# Patient Record
Sex: MALE | URBAN - METROPOLITAN AREA
[De-identification: names, ages, dates, MRNs, and addresses within clinical notes are randomized per-mention and may not be internally consistent; named-entity substitution may affect disease eponyms.]

---

## 2020-11-24 ENCOUNTER — TRANSFERRED RECORDS (OUTPATIENT)
Dept: HEALTH INFORMATION MANAGEMENT | Facility: CLINIC | Age: 65
End: 2020-11-24

## 2020-11-30 ENCOUNTER — TRANSFERRED RECORDS (OUTPATIENT)
Dept: HEALTH INFORMATION MANAGEMENT | Facility: CLINIC | Age: 65
End: 2020-11-30

## 2020-11-30 ENCOUNTER — MEDICAL CORRESPONDENCE (OUTPATIENT)
Dept: HEALTH INFORMATION MANAGEMENT | Facility: CLINIC | Age: 65
End: 2020-11-30

## 2020-12-18 ENCOUNTER — ANCILLARY PROCEDURE (OUTPATIENT)
Dept: PET IMAGING | Facility: CLINIC | Age: 65
End: 2020-12-18
Attending: UROLOGY
Payer: COMMERCIAL

## 2020-12-18 DIAGNOSIS — C61 MALIGNANT NEOPLASM OF PROSTATE (H): ICD-10-CM

## 2020-12-23 ENCOUNTER — MEDICAL CORRESPONDENCE (OUTPATIENT)
Dept: HEALTH INFORMATION MANAGEMENT | Facility: CLINIC | Age: 65
End: 2020-12-23

## 2023-06-20 ENCOUNTER — TRANSFERRED RECORDS (OUTPATIENT)
Dept: HEALTH INFORMATION MANAGEMENT | Facility: CLINIC | Age: 68
End: 2023-06-20

## 2023-09-11 ENCOUNTER — TRANSFERRED RECORDS (OUTPATIENT)
Dept: HEALTH INFORMATION MANAGEMENT | Facility: CLINIC | Age: 68
End: 2023-09-11

## 2023-12-05 ENCOUNTER — TRANSFERRED RECORDS (OUTPATIENT)
Dept: HEALTH INFORMATION MANAGEMENT | Facility: CLINIC | Age: 68
End: 2023-12-05

## 2024-03-06 ENCOUNTER — TRANSFERRED RECORDS (OUTPATIENT)
Dept: HEALTH INFORMATION MANAGEMENT | Facility: CLINIC | Age: 69
End: 2024-03-06

## 2024-03-22 ENCOUNTER — TRANSFERRED RECORDS (OUTPATIENT)
Dept: HEALTH INFORMATION MANAGEMENT | Facility: CLINIC | Age: 69
End: 2024-03-22

## 2024-05-24 ENCOUNTER — TRANSFERRED RECORDS (OUTPATIENT)
Dept: HEALTH INFORMATION MANAGEMENT | Facility: CLINIC | Age: 69
End: 2024-05-24

## 2024-07-26 ENCOUNTER — TRANSFERRED RECORDS (OUTPATIENT)
Dept: HEALTH INFORMATION MANAGEMENT | Facility: CLINIC | Age: 69
End: 2024-07-26

## 2024-09-27 ENCOUNTER — TRANSFERRED RECORDS (OUTPATIENT)
Dept: HEALTH INFORMATION MANAGEMENT | Facility: CLINIC | Age: 69
End: 2024-09-27

## 2024-10-08 ENCOUNTER — TRANSFERRED RECORDS (OUTPATIENT)
Dept: HEALTH INFORMATION MANAGEMENT | Facility: CLINIC | Age: 69
End: 2024-10-08

## 2025-03-07 ENCOUNTER — TRANSFERRED RECORDS (OUTPATIENT)
Dept: HEALTH INFORMATION MANAGEMENT | Facility: CLINIC | Age: 70
End: 2025-03-07

## 2025-04-09 ENCOUNTER — TRANSFERRED RECORDS (OUTPATIENT)
Dept: HEALTH INFORMATION MANAGEMENT | Facility: CLINIC | Age: 70
End: 2025-04-09
Payer: COMMERCIAL

## 2025-04-16 ENCOUNTER — TRANSFERRED RECORDS (OUTPATIENT)
Dept: HEALTH INFORMATION MANAGEMENT | Facility: CLINIC | Age: 70
End: 2025-04-16

## 2025-04-16 ENCOUNTER — MEDICAL CORRESPONDENCE (OUTPATIENT)
Dept: HEALTH INFORMATION MANAGEMENT | Facility: CLINIC | Age: 70
End: 2025-04-16

## 2025-04-21 ENCOUNTER — PATIENT OUTREACH (OUTPATIENT)
Dept: ONCOLOGY | Facility: CLINIC | Age: 70
End: 2025-04-21

## 2025-04-21 ENCOUNTER — TRANSCRIBE ORDERS (OUTPATIENT)
Dept: OTHER | Age: 70
End: 2025-04-21

## 2025-04-21 DIAGNOSIS — C61 PROSTATE CANCER METASTATIC TO MULTIPLE SITES (H): Primary | ICD-10-CM

## 2025-04-21 NOTE — PROGRESS NOTES
New Patient Oncology Nurse Navigator Note     Referring provider:   Rafi Rooney (Hematology/Oncology) [facility]    T [phone]  F [phone]      Referred to (specialty): Medical Oncology    Requested provider (if applicable):   Dr. Boss      Date Referral Received:   04/21/25     Evaluation for :        Clinical History (per Nurse review of records provided):    Everett Segundo (Radiation Oncology),   Rafi Rooney (Hematology/Oncology)  Wenceslao De La Torre MD (Urology)    Per [facility] note 4/16/25:  Assessment  1. 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  -6/28/23, PSA 9.8, switched to Abiraterone + prednisone 5 mg PO daily + ADT  -12/13/2023, PSA 13, PSMA (stable/bone avid mets) => Apalutamide declined by insurance  -1/17/2024, PSA 18, initiated C1 docetaxel Q21D + prednisone Smg BID + ADT  PSA is 21<PSA is 19<PSA is 14.9, PSA is 9.59 as of 7/26/2024.  -7131/2024: completed C10D1 docetaxel Q21D + prednisone S5mg BID + ADT.  - 8/21/24: completed C11D1 docetaxel Q21D + prednisone 5mg BID + ADT + C1D64 Eligard  - 9/11/2024: completed C12D1 docetaxel Q21D + prednisone Smg BID + ADT  - 9/27/24 PSMA PET showing disease progression. Consult to Rad Onc.  - 11/13/24: Pluvicto started, given every 6 weeks. Managed by [facility] team.  - 1/7/25: Cycle 2 Pluvicto to be given later today. Pt has Rad Onc BAYLEE fi/u for following labs and PSA.  - 2/5/25: Proceed Eligard shot today, along with Zometa. Next cycle of Pluvicto is on 2/18/25. Continues on darolutamide 600 mg BID.  - 4/1/2025: Received cycle 4 Pluvicto. PSA is 84.90 on 4/9/2025.    Plan  Reviewed PSMA PET scan with the patient in detail, which showed some osseous metastatic disease have improved, however, there  are numerous new radiotracer avid osseous metastases involving  the axial and proximal appendicular skeleton.  Reviewed his labs from 4/9/2025, CBC showed WBC of 10.0, HGB at 11.6, and platelets at 217. CMP is within normal limits. His PSA  continues to rise at 84.90 on 4/9/2025, which was previously 68.10 on 3/7/2025, 35.00 on 12/16/2024, and 18.40 on 10/23/2024.  1. He was started on Pluvicto through [facility] on 11/13/24 to be given every 6 weeks, has tolerated 4 cycles well,  most recent was on 4/1/2025.    - Would recommend to continue 2 additional cycles of Pluvicto through [facility] given every 6 weeks. Next treatment  is due on 5/13/2025. Will recheck PSA and PSMA PET scan after cycle 6 to assess improvement. If disease progression  continues, options include: clinical trial participation vs. chemotherapy with Cabazitaxel and palliative care approach.  - Continue Eligard Q3M. Next treatment is due on 4/30/2025.  - Continue darolutamide oral 600 mg BID for androgen blockade.  - Discussed to follow-up with [facility] for potential clinical trial eligibility. Sent consult to see Dr. Boss at [facility] for potential clinical trial eligibility.  2. Skeletal metastasis: Continue Zometa every 6 weeks. Next dose is due on 5/6/2025.    See oncology notes for full hx.    Pertinent urology notes, pathology/labs & imaging bookmarked**        Records Location (Care Everywhere, Media, etc.):   [facility]  [facility]      I called in attempt to reach patient to discuss referral to oncology.  There was no answer so I left message with our new patient scheduling number: [phone], for patient to call to schedule consult appointment.     I will forward on referral at this time with the following plan-     TENTATIVE PLAN:  SCHEDULE: Dr. Boss @ [facility], [facility] in person, first available    Basia Puga, RN, BSN  Oncology New Patient Nurse Navigator   [facility]  [phone]

## 2025-04-23 NOTE — TELEPHONE ENCOUNTER
RECORDS STATUS - ALL OTHER DIAGNOSIS      Action    Action Taken 4/23/25  Spoke w/ Héctor @ [facility] Medical Records - advised pt last seen 4/21/25. They will fax these records, push imaging & fax imaging reports    Spoke w/ Jackelin @ [facility] - they will push imaging & fax reports shortly.    Spoke w/ Eloise @ [facility] - advised we have most Records (11/30/20).    Imaging reports from [facility] received, sent to [facility] for STAT upload, emailed to NN Email, YU GUTIERREZ.     Records from [facility] received, sent to [facility] for STAT upload, emailed to  Email, YU GUTIERREZ.   10:39 AM      RECORDS RECEIVED FROM: [facility], [facility], [facility], [facility]   DATE RECEIVED:    NOTES STATUS DETAILS   OFFICE NOTE from referring provider [facility] Dr. Rafi Rooney : 4/16/25   OFFICE NOTE from medical oncologist [facility] Dr. Rafi Rooney : 4/16/25   OFFICE NOTE from urologist [facility] Dr. Wenceslao De La Torre: 11/30/25   OPERATIVE REPORT CE - [facility] 9/23/24: Incision Drainage Perirectal Abscess  11/14/23, 5/3/16: Colonoscopy  1/24/17: Robotic Assisted Radical Prostatectomy  11/1/16: TRUS   MEDICATION LIST [facility]   LABS     PATHOLOGY REPORTS [facility], Reports in CE, slides requested 4/23 1/24/17: [id number]  11/1/16: [id number]   ANYTHING RELATED TO DIAGNOSIS CE - [facility] 4/9/25   PATHOLOGY FEDEX TRACKING   TRACKING #: [id number]   GENONOMIC TESTING     TYPE:     IMAGING (NEED IMAGES & REPORT)     CT SCANS PACS [facility]  9/22/24    [facility]  11/11/16 - 7/26/24   MRI PACS [facility]  9/26/18   NM PACS [facility]  11/11/16 - 3/22/24   ULTRASOUND PACS [facility]  11/1/16   PET PACS [facility]  4/9/25, 5/24/24    [facility]  9/27/24

## 2025-05-01 ENCOUNTER — LAB REQUISITION (OUTPATIENT)
Dept: LAB | Facility: CLINIC | Age: 70
End: 2025-05-01
Payer: COMMERCIAL

## 2025-05-01 PROCEDURE — 88321 CONSLTJ&REPRT SLD PREP ELSWR: CPT | Performed by: PATHOLOGY

## 2025-05-04 NOTE — PROGRESS NOTES
[facility] Medical [facility]cology Second Opinion Consultation Note       Date of visit: May 4, 2025    Dear Dr. Rooney, thank you for referring your patient for a Second Opinion consultation at the [facility].  A brief overview of his oncological history as well as my recommendations follow below.    CC: Prostate cancer     HPI:   Wenceslao Finch is a 70 year old male with a history of prostate cancer diagnosed in 11/2016 as [place] score 4+3 = 7, s/p prostatectomy, staged at pT2. PSA became detectable again and he underwent salvage radiation with Dr. Irving ending 12/2018, along with starting 2 years of ADT. PSA became detectable again in 07/2020 without any evidence of metastatic disease on PET. He continued progressing on multiple therapies and developed bone mets. He was initiated on Docetaxel/Darolutamide/Prednisone 1/17/2024 and has continued this with a  couple of hospitalizations for neurtropenic fever, last cycle on 9/11/2024. In 9/7/2024 PSMA PET showed disease progression following which he was started on Pluvicto and has completed 4 cycles so far, last on 4/1/2025. He continues to have a rising PSA (84.9 on 4/9/25). He continues to be on Eligard, last received 4/30/2025 and Darolutamide 600mg bid as well as Zometa for skeletal metastasis, next due on 5/6/2025. Referral sent here to see if the patient would be eligible for clinical trials.    INTERVAL HISTORY:   The patient presents with his wife today. He states that overall he is doing well and has not had any side effects from Pluvicto. 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 He currently denies any fever, chills, nausea or vomiting. He has tingling on his toes but is able to feel the ground. No other neurological symptoms. No numbness or tingling in upper extremities.     ONCOLOGY HISTORY: .   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  -23, PSA 9.8, switched to Abiraterone + prednisone 5 mg PO daily + ADT  -2023, PSA 13, PSMA (stable/bone avid mets) => Apalutamide declined by insurance  -2024, PSA 18, initiated C1 docetaxel Q21D + prednisone Smg BID + ADT  PSA is 21<PSA is 19<PSA is 14.9, PSA is 9.59 as of 2024.  -: completed C10D1 docetaxel Q21D + prednisone S5mg BID + ADT.  - 24: completed C11D1 docetaxel Q21D + prednisone 5mg BID + ADT + C1D64 Eligard  - 2024: completed C12D1 docetaxel Q21D + prednisone Smg BID + ADT  - 24 PSMA PET showing disease progression. Consult to Rad Onc.  - 24: Pluvicto started, given every 6 weeks. Managed by [facility] team.  - 25: Cycle 2 Pluvicto to be given later today. Pt has Rad Onc BAYLEE fi/u for following labs and PSA.  - 25: Proceed Eligard shot today, along with Zometa.. Continues on darolutamide 600 mg BID.  - 2025: Received cycle 4 Pluvicto. PSA is 84.90 on 2025.  Clear progression on PSMA PET from  with numerous new PSMA avid lesions.      GENOMIC STUDIES:   Caris testing: AR-3+, TMB low, MSI indeterminate, BRCA negative    GUIDELINES USED: NCCN    INTENT OF THERAPY: Palliative     CLINICAL TRIALS:  [id number]  Janux excludes post-Pluvicto     Past medical and surgical history:  Prostate cancer, Hot flashes, Pre-diabetes, Drug induced constipation   S/p robotic radical prostatectomy, 2017    Family History  Father-liver cancer age 77  Brother-pancreatic cancer   Sister-breast cancer age 39  Paternal grandmother-colon cancer age 82     Social history  Patient is , works as a .  Has not been working much since being on chemotherapy.  No smoking, occasional alcohol.  Has no children    ALLERGIES: No known drug allergies    MEDS:  Current Outpatient  "Medications   Medication Sig Dispense Refill    calcium carbonate 500 mg, elemental, (OSCAL 500) 1250 (500 Ca) MG TABS tablet Take 500 mg by mouth.      EFFEXOR  MG 24 hr capsule Take 150 mg by mouth daily.      leuprolide, 4 Month, (ELIGARD) 30 MG       [START ON 5/13/2025] lutetium Erma 177 vipivotide tetraxetan (PLUVICTO) 27 mCi/mL radioisotope injection Inject 200 millicuries into the vein.      NUBEQA 300 MG tablet       predniSONE (DELTASONE) 5 MG tablet Take 5 mg by mouth.      vitamin E (TOCOPHEROL) 400 units (180 mg) capsule Take 400 Units by mouth daily.       No current facility-administered medications for this visit.     ROS-Remainder of 14 point ROS reviewed and negative except as in HPI.    PHYSICAL EXAM:  ECOG-PS=1    BP (!) 158/83   Pulse 80   Temp 98.2  F (36.8  C) (Oral)   Resp 18   Ht 1.82 m (5' 11.65\")   Wt 121.1 kg (267 lb)   SpO2 97%   BMI 36.56 kg/m    Exam:  Constitutional: healthy, alert, and no distress  Head: Normocephalic. No masses, lesions grossly.   Neck: Neck supple.   Cardiovascular: No edema or JVD.  Respiratory: normal WOB on RA   Gastrointestinal: negative, non-distended   : Deferred  Musculoskeletal: extremities normal- no gross deformities noted, gait normal, and normal muscle tone  Skin: no suspicious lesions or rashes on exposed areas of skin   Neurologic: CNII-XII grossly intact, normal speech   Psychiatric: mentation appears normal and affect normal/bright    LABS AND IMAGES:  PSMA PET scan from [facility] personally reviewed from 4/8/25.  Numerous new metastases present compared to 9/2024.       Labs:   CMP WNL and CBC with WBC of 10.0, Hgb 11.6, and plt 217.      PSA trend, see oncology history.       IMPRESSION AND PLAN:    # metastatic castration resistant prostate cancer    Burt was referred for second opinion regarding potential clinical trials and was initially seen on May 5, 2025.  He was initially diagnosed with Yarelis 4+3 equal 7 prostate " adenocarcinoma in 2016 underwent radical prostatectomy at that time.  PSA became detectable again in 2018 and he was initiated on ADT for 2 years total, which was then held.  PSA began to rise approximately 4 months later (November 2020) after holding ADT when his PSA was 7.  He was restarted on ADT with Dr. Caro in February 2021 and continued on ADT, notably his PSA began to rise significantly in January 2022 rising from 26.8-40.  He was started on enzalutamide in January 2022 for nonmetastatic prostate cancer that was hormone resistant.  His first metastasis appeared on nuclear medicine bone scan in 2023 in the left mid sternum, prompting switch to abiraterone 1000 mg daily along with prednisone.  His PSA continued to slowly rise through January 2024 when he was started on docetaxel in the CRPC setting.  Cycle 1 day 1 of docetaxel was January 17, 2024 when his PSA was 18 he was started on Pluvicto at [facility] in November 2024 and completed 4 total cycles before restaging on April 8, 2025.  That PSMA PET scan was notable for numerous new PSMA avid lesions and a PSA of 84, demonstrating clear progression while on Pluvicto and after the typical expected timeframe for treatment related flare.    At our initial visit on May 5, 2025, we reviewed his treatment history in addition to my recommendation that he consider not undergoing any additional doses of Pluvicto due to its clear lack of efficacy.  His last dose of Pluvicto was April 1, 2025.  We reviewed potential clinical trials that may be available in the very near future, on the order of weeks depending on trial opening.  We reviewed the  phase 3 study which he would be clearly eligible for.  This is a phase 3 randomized study comparing AMG 5092 with standard of care arm, either ARSI switch or cabazitaxel.  Since he has received both potential arms on the ARS I switch (enzalutamide and abiraterone), he would be in eligible to enroll on the ARSI  switch arm and would need to enroll on cabazitaxel control arm.  We reviewed that study randomization is 2:1 4 evaluate or make to cabazitaxel in his case given his prior treatment history.  He has received docetaxel in the CRPC setting and would be eligible under the newest protocol amendment that is pending.  I discussed that this trial is not open quite yet although it is very close.  Due to his last Pluvicto dose on April 1, he would not be eligible to receive his first dose of  until June 1 due to the 2-month washout required if more than 2 doses of radioligand therapy have been given.  We reviewed the major toxicities in particular cytokine release syndrome and neurologic toxicity in addition to myalgias and edema.  At this time we do not have the protocol or consent that we can share with him due to the study not being active.  As soon as a study is activated and open for enrollment we will share the consent with Burt and his wife for them to review.  We anticipate that this will open within the next 1 to 2 weeks and then we will move quickly with screening enrollment.  We did discuss JANUX 007 as well, which is also not open.  On further review of the existing protocol this does not allow for post Pluvicto treated patients so it is no longer an option for him.  We have several other clinical trials that may potentially have eligibility for him however slots are extremely limited at the moment with numerous patients on our internal waitlist for consideration.  I believe that  with its approximately 60% PSA 50 Rumer response so far would potentially be his best option if he is interested.    Of note when he was on docetaxel he did develop neutropenic fevers.  He was later started on Neulasta.  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  He is not on chronic antibiotics for this.  If you be randomized to the  cabazitaxel arm of the  phase 3 trial, I would recommend use of Neulasta and potentially prophylactic antibiotic use as well given his history.    I will see him back in 2 weeks, potentially for consent if we are able to get the study open by then.  If the study is not officially open we will not consent on that day.  I communicated my recommendations with his referring physician who is in agreement that additional Pluvicto was not an ideal choice for him.    PLAN:   I would not recommend additional Pluvicto doses.   If you would prefer not to get additional Pluvicto doses, please let your [facility] team know right away.  You can also continue Pluvicto if that is your preference, but I do not think two more doses will help.   The trials we discussed were:  [id number], a Phase 3 trial with randomization 2:1 to cabazitaxel.    CKOP172, a Phase 1 trial without randomization  ADRX-0405, very limited slots at the moment and likely months to get one  4.  LFJ322 and ESGZ836 will be open very soon, but are not currently open as of today.   5.  Cabazitaxel alone is another option if you do not prefer a clinical trial.  I would recommend giving Neulasta after each dose of chemotherapy.    6.  Prostate Cancer Foundation (PCF.org) is another good resource.    7.  RKO514 resource: https://prostatehealthed.org/page.php?zs=138  8.  JANX007 https://www.urologNourishs.com/view/janx007-demonstrates-encouraging-clinical-activity-in-mcrpc  9. Clinicaltrials.gov also has good information about each trial as you think about them.     I have seen and personally evaluated the patient today.  I reviewed vitals, medications, laboratory results, and I viewed pertinent imaging studies.  After doing so, I formulated a plan with Dr. Robles as documented in this note.  I have seen and personally evaluated the patient today.  I spent 90 minutes in the care of Wenceslao today, including an independent face-to-face assessment, review of vitals,  medications, laboratory results, and independent review of imaging studies.     The longitudinal plan of care for mCRPC was addressed during this visit. Due to the added complexity in care, I will continue to support Wenceslao Finch in the subsequent management of this condition(s) and with the ongoing continuity of care of this condition(s).     Servando Boss MD, PhD   of Medicine   Oncology/BMT/Cellular Therapies

## 2025-05-05 ENCOUNTER — PRE VISIT (OUTPATIENT)
Dept: ONCOLOGY | Facility: CLINIC | Age: 70
End: 2025-05-05
Payer: COMMERCIAL

## 2025-05-05 ENCOUNTER — ONCOLOGY VISIT (OUTPATIENT)
Dept: ONCOLOGY | Facility: CLINIC | Age: 70
End: 2025-05-05
Attending: UROLOGY
Payer: COMMERCIAL

## 2025-05-05 VITALS
BODY MASS INDEX: 36.16 KG/M2 | HEIGHT: 72 IN | WEIGHT: 267 LBS | SYSTOLIC BLOOD PRESSURE: 158 MMHG | HEART RATE: 80 BPM | OXYGEN SATURATION: 97 % | DIASTOLIC BLOOD PRESSURE: 83 MMHG | TEMPERATURE: 98.2 F | RESPIRATION RATE: 18 BRPM

## 2025-05-05 DIAGNOSIS — C61 METASTATIC CASTRATION-RESISTANT ADENOCARCINOMA OF PROSTATE (H): Primary | ICD-10-CM

## 2025-05-05 DIAGNOSIS — C61 PROSTATE CANCER METASTATIC TO BONE (H): ICD-10-CM

## 2025-05-05 DIAGNOSIS — Z19.2 METASTATIC CASTRATION-RESISTANT ADENOCARCINOMA OF PROSTATE (H): Primary | ICD-10-CM

## 2025-05-05 DIAGNOSIS — C79.51 PROSTATE CANCER METASTATIC TO BONE (H): ICD-10-CM

## 2025-05-05 DIAGNOSIS — Z19.2 HORMONE RESISTANT PROSTATE CANCER (H): ICD-10-CM

## 2025-05-05 DIAGNOSIS — C61 HORMONE RESISTANT PROSTATE CANCER (H): ICD-10-CM

## 2025-05-05 LAB
PATH REPORT.COMMENTS IMP SPEC: ABNORMAL
PATH REPORT.COMMENTS IMP SPEC: ABNORMAL
PATH REPORT.COMMENTS IMP SPEC: YES
PATH REPORT.FINAL DX SPEC: ABNORMAL
PATH REPORT.GROSS SPEC: ABNORMAL
PATH REPORT.MICROSCOPIC SPEC OTHER STN: ABNORMAL
PATH REPORT.RELEVANT HX SPEC: ABNORMAL
PATH REPORT.RELEVANT HX SPEC: ABNORMAL
PATH REPORT.SITE OF ORIGIN SPEC: ABNORMAL

## 2025-05-05 PROCEDURE — 99205 OFFICE O/P NEW HI 60 MIN: CPT | Mod: GC | Performed by: STUDENT IN AN ORGANIZED HEALTH CARE EDUCATION/TRAINING PROGRAM

## 2025-05-05 PROCEDURE — 99417 PROLNG OP E/M EACH 15 MIN: CPT | Performed by: STUDENT IN AN ORGANIZED HEALTH CARE EDUCATION/TRAINING PROGRAM

## 2025-05-05 PROCEDURE — G2211 COMPLEX E/M VISIT ADD ON: HCPCS | Performed by: STUDENT IN AN ORGANIZED HEALTH CARE EDUCATION/TRAINING PROGRAM

## 2025-05-05 PROCEDURE — G0463 HOSPITAL OUTPT CLINIC VISIT: HCPCS | Performed by: STUDENT IN AN ORGANIZED HEALTH CARE EDUCATION/TRAINING PROGRAM

## 2025-05-05 RX ORDER — IBUPROFEN 200 MG
500 CAPSULE ORAL
COMMUNITY
Start: 2024-12-26

## 2025-05-05 RX ORDER — PREDNISONE 5 MG/1
5 TABLET ORAL
COMMUNITY
Start: 2024-08-21

## 2025-05-05 RX ORDER — MULTIVIT WITH MINERALS/LUTEIN
400 TABLET ORAL DAILY
COMMUNITY

## 2025-05-05 RX ORDER — VENLAFAXINE HCL 150 MG
150 CAPSULE, EXT RELEASE 24 HR ORAL DAILY
COMMUNITY
Start: 2024-11-13

## 2025-05-05 RX ORDER — DAROLUTAMIDE 300 MG/1
TABLET, FILM COATED ORAL
COMMUNITY
Start: 2024-05-29

## 2025-05-05 RX ORDER — LUTETIUM LU 177 VIPIVOTIDE TETRAXETAN 27 MCI/ML
200 INJECTION, SOLUTION INTRAVENOUS
COMMUNITY
Start: 2025-05-13

## 2025-05-05 ASSESSMENT — PAIN SCALES - GENERAL: PAINLEVEL_OUTOF10: NO PAIN (0)

## 2025-05-05 NOTE — PATIENT INSTRUCTIONS
Burt,   Here is what we discussed today:     I would not recommend additional Pluvicto doses.   If you would prefer not to get additional Pluvicto doses, please let your [facility] team know right away.  You can also continue Pluvicto if that is your preference, but I do not think two more doses will help.   The trials we discussed were:  [id number], a Phase 3 trial with randomization 2:1 to cabazitaxel.    VFJS365, a Phase 1 trial without randomization  ADRX-0405, very limited slots at the moment and likely months to get one  4.  BCS220 and QRGL831 will be open very soon, but are not currently open as of today.   5.  Cabazitaxel alone is another option if you do not prefer a clinical trial.  I would recommend giving Neulasta after each dose of chemotherapy.    6.  Prostate Cancer Foundation (PCF.org) is another good resource.    7.  OAS756 resource: https://prostatehealthed.org/page.php?af=499  8.  JANX007 https://www.urologProsperity [employer]te [employer]ds.com/view/janx007-demonstrates-encouraging-clinical-activity-in-mcrpc  9. Clinicaltrials.gov also has good information about each trial as you think about them.

## 2025-05-14 ENCOUNTER — PATIENT OUTREACH (OUTPATIENT)
Dept: ONCOLOGY | Facility: CLINIC | Age: 70
End: 2025-05-14
Payer: COMMERCIAL

## 2025-05-14 NOTE — TELEPHONE ENCOUNTER
Reached out to pt to discuss clinical trial [id number] Phase 3. Left a voicemail with direct call back number to further discuss and send consent information to review.    Liat Galindo MA, MSN, RN   Clinical Research Coordinator Nurse   Solid Tumor Unit  Clinical Trials Office     P: [phone]

## 2025-05-16 NOTE — PROGRESS NOTES
[id number]: Informed Consent Note     The consent form, including purpose, risks and benefits, was reviewed with Wenceslao Finch, and all questions were answered before he signed the consent form. The patient understands that the study involves an active treatment phase as well as a post-treatment follow up phase.     Present during the discussion were patient himself, his spouse Geovanna, Dr Boss and Liat Galindo RN. A copy of the signed form was provided to the patient. No procedures specific to this study were performed prior to the patient signing the consent form.    RACE AND ETHNICITY:  I discussed with Wenceslao Finch that the National Cancer [place] (NCI) has asked that information on race and ethnicity be obtained from NCI-sponsored studies' participants whenever possible and that the purpose for this request is to assist the NCI in evaluating whether persons of all races and ethnicity are given the opportunity to participate in new cancer treatment options. It was explained that the information will be sent to the NCI in a way in which he cannot be identified. It was also explained that providing this information is voluntary.  Wenceslao Finch provided the following responses:  Ethnicity: non-  Race: White  Liat Galindo RN     Reproductive Evaluation     Subject name: Wenceslao Finch   I discussed with the patient that in order to participate in this study he must agree to use effective contraception during therapy and continuing for 6 months after the last dose of xaluritamig, 4 months after last dose of cabazitaxel, 3 months after last dose of enzalutamide, or 3 weeks after the last dose of abiraterone acetate. Examples of effective contraception were provided, including hormonal contraception, intrauterine devices, and double barrier contraception. He agreed to use effective contraception per the study's requirements.  Liat Galindo RN  Form 503.03.03 (Version 1)     Effective date:  [id number]     Next Review Date: 01JUL2020

## 2025-05-17 ENCOUNTER — HEALTH MAINTENANCE LETTER (OUTPATIENT)
Age: 70
End: 2025-05-17

## 2025-05-18 NOTE — PATIENT INSTRUCTIONS
Burt,   Here is what we discussed today:     Consent signed today for [id number] study.   First dose of therapy (whether NAH800 or cabazitaxel) likely to be the week of 6/9 if everything goes as planned.    Screening to start.  Echocardiogram and imaging pending.    STOP darolutamide today to allow for appropriate washout prior to study initiation.   Starting tomorrow (5/20) cut prednisone in half and take 5mg a day once in the morning.  After 7 days, reduce to 5mg every OTHER day.  Please watch for signs/symptoms of adrenal insufficiency including worsening fatigue, low blood pressure or dizziness, nausea, and diarrhea.  Let me know right away if this happens and go back to the last prednisone dose that you felt well on.    Please watch for screening study scheduling communications.

## 2025-05-18 NOTE — PROGRESS NOTES
[facility] Medical Oncology Second Opinion Consultation Note       Date of visit: May 4, 2025    Dear Dr. Rooney, thank you for referring your patient for a Second Opinion consultation at the [facility].  A brief overview of his oncological history as well as my recommendations follow below.    CC: Prostate cancer     HPI:   Wenceslao Finch is a 70 year old male with a history of prostate cancer diagnosed in 11/2016 as [place] score 4+3 = 7, s/p prostatectomy, staged at pT2. PSA became detectable again and he underwent salvage radiation with Dr. Irving ending 12/2018, along with starting 2 years of ADT. PSA became detectable again in 07/2020 without any evidence of metastatic disease on PET. He continued progressing on multiple therapies and developed bone mets. He was initiated on Docetaxel/Darolutamide/Prednisone 1/17/2024 and has continued this with a  couple of hospitalizations for neurtropenic fever, last cycle on 9/11/2024. In 9/7/2024 PSMA PET showed disease progression following which he was started on Pluvicto and has completed 4 cycles so far, last on 4/1/2025. He continues to have a rising PSA (84.9 on 4/9/25). He continues to be on Eligard, last received 4/30/2025 and Darolutamide 600mg bid as well as Zometa for skeletal metastasis, next due on 5/6/2025.     INTERVAL HISTORY:   Burt presents today with Gloria Alvarado for [id number] Phase 3 consent.  Feeling well overall.  Typical aches and pains.  No fevers.  No fevers, chills or night sweats.  Appetite is good.  He has had dexamethasone in the past and tolerated OK.  He does have a hard time sleeping with it.  Minimal neuropathy in feet only.  Some bruises on his hands from long-term prednisone use.  Still taking darolutamide right now.  He notes he is still taking prednisone also, which was started with abiraterone (5mg BID), but he is not aware of being told to taper off.  Otherwise feels well and is interested  in proceeding with consent and screening for [id number] given his progression on Pluvicto.      ONCOLOGY HISTORY: .   Metastatic CRPC, adenocarcinoma, GG3 ([place] 4+3 =7), PSA rising:  -Caris testing: AR-3+, TMB low, MSI indeterminate, BRCA negative  -2022, started enzalutamide + Lupron every 3 months on 22.  -23, PSA 9.8, switched to Abiraterone + prednisone 5 mg PO daily + ADT  -2023, PSA 13, PSMA (stable/bone avid mets) => Apalutamide declined by insurance  -2024, PSA 18, initiated C1 docetaxel Q21D + prednisone Smg BID + ADT  PSA is 21<PSA is 19<PSA is 14.9, PSA is 9.59 as of 2024.  -: completed C10D1 docetaxel Q21D + prednisone S5mg BID + ADT.  - 24: completed C11D1 docetaxel Q21D + prednisone 5mg BID + ADT + C1D64 Eligard.  PSA 9.55  - 2024: completed C12D1 docetaxel Q21D + prednisone Smg BID + ADT. PSA 11.00  - 24 PSMA PET showing disease progression. Consult to Rad Onc.  PSA 8.89.   - 10/23/24-PSA 18.40  - 24: Pluvicto started, given every 6 weeks. Managed by [facility] team.  - 24- PSA 35.00  - 25: Cycle 2 Pluvicto to be given later today. Pt has Rad Onc BAYLEE fi/u for following labs and PSA.  - 25- PSA 68.00  - 25: Proceed Eligard shot today, along with Zometa.. Continues on darolutamide 600 mg BID.  - 25: Received cycle 4 Pluvicto. PSA is 84.90 on 2025.  Clear progression on PSMA PET from  with numerous new PSMA avid lesions.    - 25- Second opinion at [facility].  -25- [id number] Phase 3 consent signed.  Screening initiated.  Stop Darolutamide.  Reduce prednisone to 5mg daily x7 days and then 5mg every other day.      GENOMIC STUDIES:   Caris testing: AR-3+, TMB low, MSI indeterminate, BRCA negative    GUIDELINES USED: NCCN    INTENT OF THERAPY: Palliative     CLINICAL TRIALS:  [id number] (cabazi control arm eligible)     ALLERGIES: No known drug allergies    MEDS:  Current Outpatient Medications   Medication Sig Dispense  "Refill    calcium carbonate 500 mg, elemental, (OSCAL 500) 1250 (500 Ca) MG TABS tablet Take 500 mg by mouth.      EFFEXOR  MG 24 hr capsule Take 150 mg by mouth daily.      ibuprofen (ADVIL/MOTRIN) 200 MG tablet Take 200 mg by mouth every 4 hours as needed for pain.      leuprolide, 4 Month, (ELIGARD) 30 MG       loratadine (CLARITIN) 10 MG tablet Take 10 mg by mouth daily.      NUBEQA 300 MG tablet       predniSONE (DELTASONE) 5 MG tablet Take 5 mg by mouth.      vitamin D3 (CHOLECALCIFEROL) 50 mcg (2000 units) tablet Take 1 tablet by mouth daily.      vitamin E (TOCOPHEROL) 400 units (180 mg) capsule Take 400 Units by mouth daily.      lutetium Erma 177 vipivotide tetraxetan (PLUVICTO) 27 mCi/mL radioisotope injection Inject 200 millicuries into the vein. (Patient not taking: Reported on 5/19/2025)       No current facility-administered medications for this visit.     ROS-Remainder of 14 point ROS reviewed and negative except as in HPI.    PHYSICAL EXAM:  ECOG-PS=1    /82 (BP Location: Left arm, Patient Position: Sitting, Cuff Size: Adult Large)   Pulse 80   Temp 98.3  F (36.8  C) (Oral)   Resp 20   Ht 1.817 m (5' 11.54\")   Wt 120.4 kg (265 lb 8 oz)   SpO2 99%   BMI 36.48 kg/m    Exam:  Constitutional: healthy, alert, and no distress  Head: Normocephalic. No masses, lesions grossly.  No oral ulcers or lesions.    Neck: Neck supple. No adenopathy grossly.   Cardiovascular: No edema or JVD. RRR, no m/r/g appreciated.   Respiratory: normal WOB on RA, CTAB.    Gastrointestinal: negative, non-distended, non-tender, no masses or organomegaly noted   : Deferred  Musculoskeletal: extremities normal- no gross deformities noted, gait normal, and normal muscle tone  Skin: no suspicious lesions or rashes on exposed areas of skin   Neurologic: CNII-XII grossly intact, normal speech   Psychiatric: mentation appears normal and affect normal/bright    Level of consciousness alert   Orientation {time, place, " and person  Vision no vision changes  Cranial nerves and brain stem functions Normal   Pyramidal and extra pyramidal motor system reflexes Normal  Muscle tone and trophic findings Normal  Coordination Finger to nose test showed normal findings   Heel to shin test showed normal findings  Normal rapid alternating movements and no pronator drift   Sensory system decreased at baseline in R 5th finger sensation  neuropsychological findings (eg, speech, cognition, and emotion). Normal    LABS AND IMAGES:  PSMA PET scan from [facility] personally reviewed from 4/8/25.  Numerous new metastases present compared to 9/2024.       Labs:   CMP from 5/19/25 entirely WNL except for mild elevation in magnesium that does not require action or intervention.     CBC from 5/19/25 with WBC of 10.0, ANC 8.2, Hgb 12.0, Plt 238, MCV 94.     Hep B surface ag negative (surface Ab and core ag in process at time of note).      Hep C antibody negative.     Testosterone in process at time of note.      EKG personally reviewed from 5/19/25, not clinically significant      IMPRESSION AND PLAN:    # metastatic castration resistant prostate cancer    Burt was referred for second opinion regarding potential clinical trials and was initially seen on May 5, 2025.  He was initially diagnosed with [place] 4+3 equal 7 prostate adenocarcinoma in 2016 underwent radical prostatectomy at that time.  PSA became detectable again in 2018 and he was initiated on ADT for 2 years total, which was then held.  PSA began to rise approximately 4 months later (November 2020) after holding ADT when his PSA was 7.  He was restarted on ADT with Dr. Caro in February 2021 and continued on ADT, notably his PSA began to rise significantly in January 2022 rising from 26.8-40.  He was started on enzalutamide in January 2022 for nonmetastatic prostate cancer that was hormone resistant.  His first metastasis appeared on nuclear medicine bone scan in 2023 in the left mid sternum,  prompting switch to abiraterone 1000 mg daily along with prednisone.  His PSA continued to slowly rise through January 2024 when he was started on docetaxel in the CRPC setting.  Cycle 1 day 1 of docetaxel was January 17, 2024 when his PSA was 18 he was started on Pluvicto at [facility] in November 2024 and completed 4 total cycles before restaging on April 8, 2025.  That PSMA PET scan was notable for numerous new PSMA avid lesions and a PSA of 84, demonstrating clear progression while on Pluvicto and after the typical expected timeframe for treatment related flare.    At our initial visit on May 5, 2025, we reviewed his treatment history in addition to my recommendation that he consider not undergoing any additional doses of Pluvicto due to its clear lack of efficacy.  His last dose of Pluvicto was April 1, 2025.  We reviewed potential clinical trials that may be available in the very near future, on the order of weeks depending on trial opening.  We reviewed the  phase 3 study which he would be clearly eligible for.  This is a phase 3 randomized study comparing AMG 5092 with standard of care arm, either ARSI switch or cabazitaxel.  Since he has received both potential arms on the ARS I switch (enzalutamide and abiraterone), he would be in eligible to enroll on the ARSI switch arm and would need to enroll on cabazitaxel control arm.  We reviewed that study randomization is 2:1 4 evaluate or make to cabazitaxel in his case given his prior treatment history.  He has received docetaxel in the CRPC setting and would be eligible under the newest protocol amendment that is pending.  I discussed that this trial is not open quite yet although it is very close.  Due to his last Pluvicto dose on April 1, he would not be eligible to receive his first dose of  until June 1 due to the 2-month washout required if more than 2 doses of radioligand therapy have been given.  We reviewed the major toxicities in  particular cytokine release syndrome and neurologic toxicity in addition to myalgias and edema.    In the interval since our initial visit, the XNM496 Phase 3 study has been formally opened at the [facility].  At our 5/19/25 visit, we reviewed the consent in detail.  We reviewed alternative treatment options, most likely cabazitaxel, which is also included as the standard of care arm option that Burt would be eligible for on trial.  We reviewed the 2:1 randomization and that this is an active control arm that he would otherwise receive if [id number] was not available.  We reviewed the risks again, including CRS, myalgias, soft tissue swelling/inflammation including vasculature, mouth, face, neck and extremities.  We will initiate screening for his eligibility after completion of consent form.      PLAN:   Consent signed today for FCL437 study.   First dose of therapy (whether [id number] or cabazitaxel) likely to be the week of 6/9 if everything goes as planned.    Screening to start.  Echocardiogram and imaging pending.    STOP darolutamide today to allow for appropriate washout prior to study initiation.   Starting tomorrow (5/20) cut prednisone in half and take 5mg a day once in the morning.  After 7 days, reduce to 5mg every OTHER day.  Please watch for signs/symptoms of adrenal insufficiency including worsening fatigue, low blood pressure or dizziness, nausea, and diarrhea.  Let me know right away if this happens and go back to the last prednisone dose that you felt well on.    Please watch for screening study scheduling communications.      A total of 90 minutes spent on the date of the encounter doing chart review, review of test results, interpretation of tests, patient visit, and documentation.  The patient was given the opportunity to ask multiple questions today, all of which were answered to their satisfaction.  Extended service time for patient consent, care coordination and interpretation of study  enrollment criteria and labs.     The longitudinal plan of care for mCRPC was addressed during this visit. Due to the added complexity in care, I will continue to support Wenceslao Finch in the subsequent management of this condition(s) and with the ongoing continuity of care of this condition(s).     Servando Boss MD, PhD   of Medicine   Oncology/BMT/Cellular Therapies

## 2025-05-19 ENCOUNTER — ALLIED HEALTH/NURSE VISIT (OUTPATIENT)
Dept: ONCOLOGY | Facility: CLINIC | Age: 70
End: 2025-05-19

## 2025-05-19 ENCOUNTER — ONCOLOGY VISIT (OUTPATIENT)
Dept: ONCOLOGY | Facility: CLINIC | Age: 70
End: 2025-05-19
Attending: STUDENT IN AN ORGANIZED HEALTH CARE EDUCATION/TRAINING PROGRAM
Payer: COMMERCIAL

## 2025-05-19 ENCOUNTER — APPOINTMENT (OUTPATIENT)
Dept: LAB | Facility: CLINIC | Age: 70
End: 2025-05-19
Attending: STUDENT IN AN ORGANIZED HEALTH CARE EDUCATION/TRAINING PROGRAM
Payer: COMMERCIAL

## 2025-05-19 ENCOUNTER — RESULTS FOLLOW-UP (OUTPATIENT)
Dept: ONCOLOGY | Facility: CLINIC | Age: 70
End: 2025-05-19

## 2025-05-19 VITALS
WEIGHT: 265.5 LBS | SYSTOLIC BLOOD PRESSURE: 138 MMHG | HEIGHT: 72 IN | TEMPERATURE: 98.3 F | RESPIRATION RATE: 20 BRPM | DIASTOLIC BLOOD PRESSURE: 82 MMHG | HEART RATE: 80 BPM | OXYGEN SATURATION: 99 % | BODY MASS INDEX: 35.96 KG/M2

## 2025-05-19 DIAGNOSIS — D49.89 NEOPLASM OF UNSPECIFIED BEHAVIOR OF OTHER SPECIFIED SITES: ICD-10-CM

## 2025-05-19 DIAGNOSIS — C61 METASTATIC CASTRATION-RESISTANT ADENOCARCINOMA OF PROSTATE (H): Primary | ICD-10-CM

## 2025-05-19 DIAGNOSIS — Z19.2 HORMONE RESISTANT PROSTATE CANCER (H): ICD-10-CM

## 2025-05-19 DIAGNOSIS — Z72.89 OTHER PROBLEMS RELATED TO LIFESTYLE: ICD-10-CM

## 2025-05-19 DIAGNOSIS — C61 PROSTATE CANCER METASTATIC TO BONE (H): ICD-10-CM

## 2025-05-19 DIAGNOSIS — Z19.2 METASTATIC CASTRATION-RESISTANT ADENOCARCINOMA OF PROSTATE (H): Primary | ICD-10-CM

## 2025-05-19 DIAGNOSIS — C79.51 PROSTATE CANCER METASTATIC TO BONE (H): ICD-10-CM

## 2025-05-19 DIAGNOSIS — Z11.59 ENCOUNTER FOR SCREENING FOR OTHER VIRAL DISEASES: ICD-10-CM

## 2025-05-19 DIAGNOSIS — C61 HORMONE RESISTANT PROSTATE CANCER (H): ICD-10-CM

## 2025-05-19 LAB
ALBUMIN SERPL BCG-MCNC: 4.1 G/DL (ref 3.5–5.2)
ALP SERPL-CCNC: 45 U/L (ref 40–150)
ALT SERPL W P-5'-P-CCNC: 18 U/L (ref 0–70)
ANION GAP SERPL CALCULATED.3IONS-SCNC: 12 MMOL/L (ref 7–15)
AST SERPL W P-5'-P-CCNC: 22 U/L (ref 0–45)
BASOPHILS # BLD AUTO: 0 10E3/UL (ref 0–0.2)
BASOPHILS NFR BLD AUTO: 0 %
BILIRUB SERPL-MCNC: 0.3 MG/DL
BUN SERPL-MCNC: 19.3 MG/DL (ref 8–23)
CALCIUM SERPL-MCNC: 9.5 MG/DL (ref 8.8–10.4)
CHLORIDE SERPL-SCNC: 101 MMOL/L (ref 98–107)
CREAT SERPL-MCNC: 0.99 MG/DL (ref 0.67–1.17)
CRP SERPL-MCNC: <3 MG/L
EGFRCR SERPLBLD CKD-EPI 2021: 82 ML/MIN/1.73M2
EOSINOPHIL # BLD AUTO: 0.2 10E3/UL (ref 0–0.7)
EOSINOPHIL NFR BLD AUTO: 2 %
ERYTHROCYTE [DISTWIDTH] IN BLOOD BY AUTOMATED COUNT: 14.5 % (ref 10–15)
GLUCOSE SERPL-MCNC: 91 MG/DL (ref 70–99)
HBV CORE AB SERPL QL IA: NONREACTIVE
HBV SURFACE AB SERPL IA-ACNC: 3.65 M[IU]/ML
HBV SURFACE AB SERPL IA-ACNC: NONREACTIVE M[IU]/ML
HBV SURFACE AG SERPL QL IA: NONREACTIVE
HCO3 SERPL-SCNC: 23 MMOL/L (ref 22–29)
HCT VFR BLD AUTO: 36.5 % (ref 40–53)
HCV AB SERPL QL IA: NONREACTIVE
HGB BLD-MCNC: 12 G/DL (ref 13.3–17.7)
IMM GRANULOCYTES # BLD: 0.1 10E3/UL
IMM GRANULOCYTES NFR BLD: 1 %
LDH SERPL L TO P-CCNC: 244 U/L (ref 0–250)
LYMPHOCYTES # BLD AUTO: 0.8 10E3/UL (ref 0.8–5.3)
LYMPHOCYTES NFR BLD AUTO: 8 %
MAGNESIUM SERPL-MCNC: 2.4 MG/DL (ref 1.7–2.3)
MCH RBC QN AUTO: 30.8 PG (ref 26.5–33)
MCHC RBC AUTO-ENTMCNC: 32.9 G/DL (ref 31.5–36.5)
MCV RBC AUTO: 94 FL (ref 78–100)
MONOCYTES # BLD AUTO: 0.8 10E3/UL (ref 0–1.3)
MONOCYTES NFR BLD AUTO: 8 %
NEUTROPHILS # BLD AUTO: 8.2 10E3/UL (ref 1.6–8.3)
NEUTROPHILS NFR BLD AUTO: 82 %
NRBC # BLD AUTO: 0 10E3/UL
NRBC BLD AUTO-RTO: 0 /100
PHOSPHATE SERPL-MCNC: 3.3 MG/DL (ref 2.5–4.5)
PLATELET # BLD AUTO: 238 10E3/UL (ref 150–450)
POTASSIUM SERPL-SCNC: 4.1 MMOL/L (ref 3.4–5.3)
PROT SERPL-MCNC: 7 G/DL (ref 6.4–8.3)
RBC # BLD AUTO: 3.89 10E6/UL (ref 4.4–5.9)
RETICS # AUTO: 0.06 10E6/UL (ref 0.03–0.1)
RETICS/RBC NFR AUTO: 1.6 % (ref 0.5–2)
SODIUM SERPL-SCNC: 136 MMOL/L (ref 135–145)
URATE SERPL-MCNC: 4.6 MG/DL (ref 3.4–7)
WBC # BLD AUTO: 10 10E3/UL (ref 4–11)

## 2025-05-19 PROCEDURE — 99213 OFFICE O/P EST LOW 20 MIN: CPT | Performed by: STUDENT IN AN ORGANIZED HEALTH CARE EDUCATION/TRAINING PROGRAM

## 2025-05-19 PROCEDURE — 85025 COMPLETE CBC W/AUTO DIFF WBC: CPT | Performed by: STUDENT IN AN ORGANIZED HEALTH CARE EDUCATION/TRAINING PROGRAM

## 2025-05-19 PROCEDURE — 82040 ASSAY OF SERUM ALBUMIN: CPT | Performed by: STUDENT IN AN ORGANIZED HEALTH CARE EDUCATION/TRAINING PROGRAM

## 2025-05-19 PROCEDURE — 86803 HEPATITIS C AB TEST: CPT | Performed by: STUDENT IN AN ORGANIZED HEALTH CARE EDUCATION/TRAINING PROGRAM

## 2025-05-19 PROCEDURE — 85045 AUTOMATED RETICULOCYTE COUNT: CPT | Performed by: STUDENT IN AN ORGANIZED HEALTH CARE EDUCATION/TRAINING PROGRAM

## 2025-05-19 PROCEDURE — 84403 ASSAY OF TOTAL TESTOSTERONE: CPT | Performed by: STUDENT IN AN ORGANIZED HEALTH CARE EDUCATION/TRAINING PROGRAM

## 2025-05-19 PROCEDURE — 83615 LACTATE (LD) (LDH) ENZYME: CPT | Performed by: STUDENT IN AN ORGANIZED HEALTH CARE EDUCATION/TRAINING PROGRAM

## 2025-05-19 PROCEDURE — 86140 C-REACTIVE PROTEIN: CPT | Performed by: STUDENT IN AN ORGANIZED HEALTH CARE EDUCATION/TRAINING PROGRAM

## 2025-05-19 PROCEDURE — 84550 ASSAY OF BLOOD/URIC ACID: CPT | Performed by: STUDENT IN AN ORGANIZED HEALTH CARE EDUCATION/TRAINING PROGRAM

## 2025-05-19 PROCEDURE — 93005 ELECTROCARDIOGRAM TRACING: CPT

## 2025-05-19 PROCEDURE — 83735 ASSAY OF MAGNESIUM: CPT | Performed by: STUDENT IN AN ORGANIZED HEALTH CARE EDUCATION/TRAINING PROGRAM

## 2025-05-19 PROCEDURE — 36591 DRAW BLOOD OFF VENOUS DEVICE: CPT | Performed by: STUDENT IN AN ORGANIZED HEALTH CARE EDUCATION/TRAINING PROGRAM

## 2025-05-19 PROCEDURE — 84100 ASSAY OF PHOSPHORUS: CPT | Performed by: STUDENT IN AN ORGANIZED HEALTH CARE EDUCATION/TRAINING PROGRAM

## 2025-05-19 PROCEDURE — 86704 HEP B CORE ANTIBODY TOTAL: CPT | Performed by: STUDENT IN AN ORGANIZED HEALTH CARE EDUCATION/TRAINING PROGRAM

## 2025-05-19 PROCEDURE — 86706 HEP B SURFACE ANTIBODY: CPT | Performed by: STUDENT IN AN ORGANIZED HEALTH CARE EDUCATION/TRAINING PROGRAM

## 2025-05-19 PROCEDURE — 87340 HEPATITIS B SURFACE AG IA: CPT | Performed by: STUDENT IN AN ORGANIZED HEALTH CARE EDUCATION/TRAINING PROGRAM

## 2025-05-19 PROCEDURE — 250N000011 HC RX IP 250 OP 636: Performed by: STUDENT IN AN ORGANIZED HEALTH CARE EDUCATION/TRAINING PROGRAM

## 2025-05-19 RX ORDER — IBUPROFEN 200 MG
200 TABLET ORAL EVERY 4 HOURS PRN
COMMUNITY

## 2025-05-19 RX ORDER — LORATADINE 10 MG/1
10 TABLET ORAL DAILY
COMMUNITY

## 2025-05-19 RX ORDER — CHOLECALCIFEROL (VITAMIN D3) 50 MCG
1 TABLET ORAL DAILY
COMMUNITY

## 2025-05-19 RX ORDER — HEPARIN SODIUM (PORCINE) LOCK FLUSH IV SOLN 100 UNIT/ML 100 UNIT/ML
5 SOLUTION INTRAVENOUS ONCE
Status: COMPLETED | OUTPATIENT
Start: 2025-05-19 | End: 2025-05-19

## 2025-05-19 RX ADMIN — Medication 5 ML: at 11:59

## 2025-05-19 ASSESSMENT — PAIN SCALES - GENERAL: PAINLEVEL_OUTOF10: NO PAIN (0)

## 2025-05-19 NOTE — NURSING NOTE
Chief Complaint   Patient presents with    Oncology Clinic Visit     Metastatic castration-resistant adenocarcinoma of prostate     Port Draw     Labs drawn via port by RN in lab.      Port accessed and labs drawn by RN.    Vanda Davis RN

## 2025-05-19 NOTE — LETTER
5/19/2025      Wenceslao Finch  [address]  [place] [address]      Dear Colleague,    Thank you for referring your patient, Wenceslao Finch, to the [facility]. Please see a copy of my visit note below.      [facility] Medical Oncology Second Opinion Consultation Note       Date of visit: May 4, 2025    Dear Dr. Rooney, thank you for referring your patient for a Second Opinion consultation at the [facility].  A brief overview of his oncological history as well as my recommendations follow below.    CC: Prostate cancer     HPI:   Wenceslao Finch is a 70 year old male with a history of prostate cancer diagnosed in 11/2016 as Comanche score 4+3 = 7, s/p prostatectomy, staged at pT2. PSA became detectable again and he underwent salvage radiation with Dr. Irving ending 12/2018, along with starting 2 years of ADT. PSA became detectable again in 07/2020 without any evidence of metastatic disease on PET. He continued progressing on multiple therapies and developed bone mets. He was initiated on Docetaxel/Darolutamide/Prednisone 1/17/2024 and has continued this with a  couple of hospitalizations for neurtropenic fever, last cycle on 9/11/2024. In 9/7/2024 PSMA PET showed disease progression following which he was started on Pluvicto and has completed 4 cycles so far, last on 4/1/2025. He continues to have a rising PSA (84.9 on 4/9/25). He continues to be on Eligard, last received 4/30/2025 and Darolutamide 600mg bid as well as Zometa for skeletal metastasis, next due on 5/6/2025.     INTERVAL HISTORY:   Burt presents today with Gloria Alvarado for [id number] Phase 3 consent.  Feeling well overall.  Typical aches and pains.  No fevers.  No fevers, chills or night sweats.  Appetite is good.  He has had dexamethasone in the past and tolerated OK.  He does have a hard time sleeping with it.  Minimal neuropathy in feet only.  Some bruises on his hands  from long-term prednisone use.  Still taking darolutamide right now.  He notes he is still taking prednisone also, which was started with abiraterone (5mg BID), but he is not aware of being told to taper off.  Otherwise feels well and is interested in proceeding with consent and screening for [id number] given his progression on Pluvicto.      ONCOLOGY HISTORY: .   Metastatic CRPC, adenocarcinoma, GG3 (Yarelis 4+3 =7), PSA rising:  -Caris testing: AR-3+, TMB low, MSI indeterminate, BRCA negative  -2/4/2022, started enzalutamide + Lupron every 3 months on 4/29/22.  -6/28/23, PSA 9.8, switched to Abiraterone + prednisone 5 mg PO daily + ADT  -12/13/2023, PSA 13, PSMA (stable/bone avid mets) => Apalutamide declined by insurance  -1/17/2024, PSA 18, initiated C1 docetaxel Q21D + prednisone Smg BID + ADT  PSA is 21<PSA is 19<PSA is 14.9, PSA is 9.59 as of 7/26/2024.  -7131/2024: completed C10D1 docetaxel Q21D + prednisone S5mg BID + ADT.  - 8/21/24: completed C11D1 docetaxel Q21D + prednisone 5mg BID + ADT + C1D64 Eligard.  PSA 9.55  - 9/11/2024: completed C12D1 docetaxel Q21D + prednisone Smg BID + ADT. PSA 11.00  - 9/27/24 PSMA PET showing disease progression. Consult to Rad Onc.  PSA 8.89.   - 10/23/24-PSA 18.40  - 11/13/24: Pluvicto started, given every 6 weeks. Managed by [facility] Rad Onc team.  - 12/16/24- PSA 35.00  - 1/7/25: Cycle 2 Pluvicto to be given later today. Pt has Rad Onc BAYLEE fi/u for following labs and PSA.  - 1/24/25- PSA 68.00  - 2/5/25: Proceed Eligard shot today, along with Zometa.. Continues on darolutamide 600 mg BID.  - 4/1/25: Received cycle 4 Pluvicto. PSA is 84.90 on 4/9/2025.  Clear progression on PSMA PET from 4/8 with numerous new PSMA avid lesions.    - 5/5/25- Second opinion at [facility].  -5/19/25- [id number] Phase 3 consent signed.  Screening initiated.  Stop Darolutamide.  Reduce prednisone to 5mg daily x7 days and then 5mg every other day.      GENOMIC STUDIES:   Caris testing: AR-3+, TMB low, MSI  "indeterminate, BRCA negative    GUIDELINES USED: NCCN    INTENT OF THERAPY: Palliative     CLINICAL TRIALS:  [id number] (cabazi control arm eligible)     ALLERGIES: No known drug allergies    MEDS:  Current Outpatient Medications   Medication Sig Dispense Refill     calcium carbonate 500 mg, elemental, (OSCAL 500) 1250 (500 Ca) MG TABS tablet Take 500 mg by mouth.       EFFEXOR  MG 24 hr capsule Take 150 mg by mouth daily.       ibuprofen (ADVIL/MOTRIN) 200 MG tablet Take 200 mg by mouth every 4 hours as needed for pain.       leuprolide, 4 Month, (ELIGARD) 30 MG        loratadine (CLARITIN) 10 MG tablet Take 10 mg by mouth daily.       NUBEQA 300 MG tablet        predniSONE (DELTASONE) 5 MG tablet Take 5 mg by mouth.       vitamin D3 (CHOLECALCIFEROL) 50 mcg (2000 units) tablet Take 1 tablet by mouth daily.       vitamin E (TOCOPHEROL) 400 units (180 mg) capsule Take 400 Units by mouth daily.       lutetium Erma 177 vipivotide tetraxetan (PLUVICTO) 27 mCi/mL radioisotope injection Inject 200 millicuries into the vein. (Patient not taking: Reported on 2025)       No current facility-administered medications for this visit.     ROS-Remainder of 14 point ROS reviewed and negative except as in HPI.    PHYSICAL EXAM:  ECOG-PS=1    /82 (BP Location: Left arm, Patient Position: Sitting, Cuff Size: Adult Large)   Pulse 80   Temp 98.3  F (36.8  C) (Oral)   Resp 20   Ht 1.817 m (5' 11.54\")   Wt 120.4 kg (265 lb 8 oz)   SpO2 99%   BMI 36.48 kg/m    Exam:  Constitutional: healthy, alert, and no distress  Head: Normocephalic. No masses, lesions grossly.  No oral ulcers or lesions.    Neck: Neck supple. No adenopathy grossly.   Cardiovascular: No edema or JVD. RRR, no m/r/g appreciated.   Respiratory: normal WOB on RA, CTAB.    Gastrointestinal: negative, non-distended, non-tender, no masses or organomegaly noted   : Deferred  Musculoskeletal: extremities normal- no gross deformities noted, gait normal, " and normal muscle tone  Skin: no suspicious lesions or rashes on exposed areas of skin   Neurologic: CNII-XII grossly intact, normal speech   Psychiatric: mentation appears normal and affect normal/bright    Level of consciousness alert   Orientation {time, place, and person  Vision no vision changes  Cranial nerves and brain stem functions Normal   Pyramidal and extra pyramidal motor system reflexes Normal  Muscle tone and trophic findings Normal  Coordination Finger to nose test showed normal findings   Heel to shin test showed normal findings  Normal rapid alternating movements and no pronator drift   Sensory system decreased at baseline in R 5th finger sensation  neuropsychological findings (eg, speech, cognition, and emotion). Normal    LABS AND IMAGES:  PSMA PET scan from [facility] personally reviewed from 4/8/25.  Numerous new metastases present compared to 9/2024.       Labs:   CMP from 5/19/25 entirely WNL except for mild elevation in magnesium that does not require action or intervention.     CBC from 5/19/25 with WBC of 10.0, ANC 8.2, Hgb 12.0, Plt 238, MCV 94.     Hep B surface ag negative (surface Ab and core ag in process at time of note).      Hep C antibody negative.     Testosterone in process at time of note.      EKG personally reviewed from 5/19/25, not clinically significant      IMPRESSION AND PLAN:    # metastatic castration resistant prostate cancer    Burt was referred for second opinion regarding potential clinical trials and was initially seen on May 5, 2025.  He was initially diagnosed with Yarelis 4+3 equal 7 prostate adenocarcinoma in 2016 underwent radical prostatectomy at that time.  PSA became detectable again in 2018 and he was initiated on ADT for 2 years total, which was then held.  PSA began to rise approximately 4 months later (November 2020) after holding ADT when his PSA was 7.  He was restarted on ADT with Dr. Caro in February 2021 and continued on ADT, notably his PSA began  to rise significantly in January 2022 rising from 26.8-40.  He was started on enzalutamide in January 2022 for nonmetastatic prostate cancer that was hormone resistant.  His first metastasis appeared on nuclear medicine bone scan in 2023 in the left mid sternum, prompting switch to abiraterone 1000 mg daily along with prednisone.  His PSA continued to slowly rise through January 2024 when he was started on docetaxel in the CRPC setting.  Cycle 1 day 1 of docetaxel was January 17, 2024 when his PSA was 18 he was started on Pluvicto at [facility] in November 2024 and completed 4 total cycles before restaging on April 8, 2025.  That PSMA PET scan was notable for numerous new PSMA avid lesions and a PSA of 84, demonstrating clear progression while on Pluvicto and after the typical expected timeframe for treatment related flare.    At our initial visit on May 5, 2025, we reviewed his treatment history in addition to my recommendation that he consider not undergoing any additional doses of Pluvicto due to its clear lack of efficacy.  His last dose of Pluvicto was April 1, 2025.  We reviewed potential clinical trials that may be available in the very near future, on the order of weeks depending on trial opening.  We reviewed the  phase 3 study which he would be clearly eligible for.  This is a phase 3 randomized study comparing AMG 5092 with standard of care arm, either ARSI switch or cabazitaxel.  Since he has received both potential arms on the ARS I switch (enzalutamide and abiraterone), he would be in eligible to enroll on the ARSI switch arm and would need to enroll on cabazitaxel control arm.  We reviewed that study randomization is 2:1 4 evaluate or make to cabazitaxel in his case given his prior treatment history.  He has received docetaxel in the CRPC setting and would be eligible under the newest protocol amendment that is pending.  I discussed that this trial is not open quite yet although it is  very close.  Due to his last Pluvicto dose on April 1, he would not be eligible to receive his first dose of  until June 1 due to the 2-month washout required if more than 2 doses of radioligand therapy have been given.  We reviewed the major toxicities in particular cytokine release syndrome and neurologic toxicity in addition to myalgias and edema.    In the interval since our initial visit, the XDK115 Phase 3 study has been formally opened at the [facility].  At our 5/19/25 visit, we reviewed the consent in detail.  We reviewed alternative treatment options, most likely cabazitaxel, which is also included as the standard of care arm option that Burt would be eligible for on trial.  We reviewed the 2:1 randomization and that this is an active control arm that he would otherwise receive if KAQ190 was not available.  We reviewed the risks again, including CRS, myalgias, soft tissue swelling/inflammation including vasculature, mouth, face, neck and extremities.  We will initiate screening for his eligibility after completion of consent form.      PLAN:   Consent signed today for VWK606 study.   First dose of therapy (whether [id number] or cabazitaxel) likely to be the week of 6/9 if everything goes as planned.    Screening to start.  Echocardiogram and imaging pending.    STOP darolutamide today to allow for appropriate washout prior to study initiation.   Starting tomorrow (5/20) cut prednisone in half and take 5mg a day once in the morning.  After 7 days, reduce to 5mg every OTHER day.  Please watch for signs/symptoms of adrenal insufficiency including worsening fatigue, low blood pressure or dizziness, nausea, and diarrhea.  Let me know right away if this happens and go back to the last prednisone dose that you felt well on.    Please watch for screening study scheduling communications.      A total of 90 minutes spent on the date of the encounter doing chart review, review of test results,  interpretation of tests, patient visit, and documentation.  The patient was given the opportunity to ask multiple questions today, all of which were answered to their satisfaction.  Extended service time for patient consent, care coordination and interpretation of study enrollment criteria and labs.     The longitudinal plan of care for mCRPC was addressed during this visit. Due to the added complexity in care, I will continue to support Wenceslao MENJIVAR Josette in the subsequent management of this condition(s) and with the ongoing continuity of care of this condition(s).     Servando Boss MD, PhD   of Medicine   Oncology/BMT/Cellular Therapies        Again, thank you for allowing me to participate in the care of your patient.        Sincerely,        Servando Boss MD    Electronically signed

## 2025-05-19 NOTE — NURSING NOTE
EKG was performed today per order written by Dr. Servando Boss.  Name and  verified with patient. Patient tolerated well without incident. File transmitted to lisa.    Irasema Villa on 2025 at 11:48 AM

## 2025-05-19 NOTE — NURSING NOTE
"Oncology Rooming Note    May 19, 2025 9:56 AM   Wenceslao Finch is a 70 year old male who presents for:    Chief Complaint   Patient presents with    Oncology Clinic Visit     Metastatic castration-resistant adenocarcinoma of prostate      Initial Vitals: /82 (BP Location: Left arm, Patient Position: Sitting, Cuff Size: Adult Large)   Pulse 80   Temp 98.3  F (36.8  C) (Oral)   Resp 20   Ht 1.817 m (5' 11.54\")   Wt 120.4 kg (265 lb 8 oz)   SpO2 99%   BMI 36.48 kg/m   Estimated body mass index is 36.48 kg/m  as calculated from the following:    Height as of this encounter: 1.817 m (5' 11.54\").    Weight as of this encounter: 120.4 kg (265 lb 8 oz). Body surface area is 2.47 meters squared.  No Pain (0) Comment: Data Unavailable   No LMP for male patient.  Allergies reviewed: Yes  Medications reviewed: Yes    Medications: MEDICATION REFILLS NEEDED TODAY. Provider was notified.  Pharmacy name entered into Jott: [id number] [place] - [address] [address]    Frailty Screening:   Is the patient here for a new oncology consult visit in cancer care? 2. No    PHQ9:  Did this patient require a PHQ9?: No      Clinical concerns: Wondering who will be covering refills for medications- will need more prednisone in the next two weeks       Irasema Villa              "

## 2025-05-20 LAB
ATRIAL RATE - MUSE: 79 BPM
DIASTOLIC BLOOD PRESSURE - MUSE: NORMAL MMHG
INTERPRETATION ECG - MUSE: NORMAL
P AXIS - MUSE: 42 DEGREES
PR INTERVAL - MUSE: 168 MS
QRS DURATION - MUSE: 106 MS
QT - MUSE: 396 MS
QTC - MUSE: 454 MS
R AXIS - MUSE: 15 DEGREES
SYSTOLIC BLOOD PRESSURE - MUSE: NORMAL MMHG
T AXIS - MUSE: 65 DEGREES
VENTRICULAR RATE- MUSE: 79 BPM

## 2025-05-21 LAB — TESTOST SERPL-MCNC: <2 NG/DL (ref 240–950)

## 2025-06-03 ENCOUNTER — HOSPITAL ENCOUNTER (OUTPATIENT)
Age: 70
End: 2025-06-03
Admitting: HOSPITALIST
Payer: COMMERCIAL

## 2025-06-04 ENCOUNTER — ANCILLARY PROCEDURE (OUTPATIENT)
Facility: CLINIC | Age: 70
End: 2025-06-04
Attending: STUDENT IN AN ORGANIZED HEALTH CARE EDUCATION/TRAINING PROGRAM
Payer: COMMERCIAL

## 2025-06-04 ENCOUNTER — HOSPITAL ENCOUNTER (OUTPATIENT)
Dept: NUCLEAR MEDICINE | Facility: CLINIC | Age: 70
Setting detail: NUCLEAR MEDICINE
Discharge: HOME | End: 2025-06-04
Attending: STUDENT IN AN ORGANIZED HEALTH CARE EDUCATION/TRAINING PROGRAM
Payer: COMMERCIAL

## 2025-06-04 ENCOUNTER — HOSPITAL ENCOUNTER (OUTPATIENT)
Dept: CT IMAGING | Facility: CLINIC | Age: 70
Discharge: HOME | End: 2025-06-04
Attending: STUDENT IN AN ORGANIZED HEALTH CARE EDUCATION/TRAINING PROGRAM
Payer: COMMERCIAL

## 2025-06-04 ENCOUNTER — HOSPITAL ENCOUNTER (OUTPATIENT)
Dept: CARDIOLOGY | Facility: CLINIC | Age: 70
Setting detail: NUCLEAR MEDICINE
Discharge: HOME | End: 2025-06-04
Attending: STUDENT IN AN ORGANIZED HEALTH CARE EDUCATION/TRAINING PROGRAM
Payer: COMMERCIAL

## 2025-06-04 DIAGNOSIS — C61 METASTATIC CASTRATION-RESISTANT ADENOCARCINOMA OF PROSTATE (H): ICD-10-CM

## 2025-06-04 DIAGNOSIS — D49.89 NEOPLASM OF UNSPECIFIED BEHAVIOR OF OTHER SPECIFIED SITES: ICD-10-CM

## 2025-06-04 DIAGNOSIS — Z19.2 METASTATIC CASTRATION-RESISTANT ADENOCARCINOMA OF PROSTATE (H): ICD-10-CM

## 2025-06-04 LAB — LVEF ECHO: NORMAL

## 2025-06-04 PROCEDURE — 343N000001 HC RX 343 MED OP 636: Performed by: STUDENT IN AN ORGANIZED HEALTH CARE EDUCATION/TRAINING PROGRAM

## 2025-06-04 PROCEDURE — A9503 TC99M MEDRONATE: HCPCS | Performed by: STUDENT IN AN ORGANIZED HEALTH CARE EDUCATION/TRAINING PROGRAM

## 2025-06-04 PROCEDURE — 99207 CT RESEARCH READING: CPT | Performed by: STUDENT IN AN ORGANIZED HEALTH CARE EDUCATION/TRAINING PROGRAM

## 2025-06-04 PROCEDURE — 255N000002 HC RX 255 OP 636: Performed by: INTERNAL MEDICINE

## 2025-06-04 PROCEDURE — 71260 CT THORAX DX C+: CPT

## 2025-06-04 PROCEDURE — 250N000011 HC RX IP 250 OP 636: Performed by: STUDENT IN AN ORGANIZED HEALTH CARE EDUCATION/TRAINING PROGRAM

## 2025-06-04 PROCEDURE — 78306 BONE IMAGING WHOLE BODY: CPT

## 2025-06-04 RX ORDER — IOPAMIDOL 755 MG/ML
135 INJECTION, SOLUTION INTRAVASCULAR ONCE
Status: COMPLETED | OUTPATIENT
Start: 2025-06-04 | End: 2025-06-04

## 2025-06-04 RX ORDER — TC 99M MEDRONATE 20 MG/10ML
20-30 INJECTION, POWDER, LYOPHILIZED, FOR SOLUTION INTRAVENOUS ONCE
Status: COMPLETED | OUTPATIENT
Start: 2025-06-04 | End: 2025-06-04

## 2025-06-04 RX ORDER — HEPARIN SODIUM (PORCINE) LOCK FLUSH IV SOLN 100 UNIT/ML 100 UNIT/ML
500 SOLUTION INTRAVENOUS ONCE
Status: COMPLETED | OUTPATIENT
Start: 2025-06-04 | End: 2025-06-04

## 2025-06-04 RX ADMIN — HUMAN ALBUMIN MICROSPHERES AND PERFLUTREN 6 ML: 10; .22 INJECTION, SOLUTION INTRAVENOUS at 09:29

## 2025-06-04 RX ADMIN — TC 99M MEDRONATE 26.9 MILLICURIE: 20 INJECTION, POWDER, LYOPHILIZED, FOR SOLUTION INTRAVENOUS at 08:03

## 2025-06-04 RX ADMIN — HEPARIN SODIUM (PORCINE) LOCK FLUSH IV SOLN 100 UNIT/ML 500 UNITS: 100 SOLUTION at 11:06

## 2025-06-04 RX ADMIN — IOPAMIDOL 135 ML: 755 INJECTION, SOLUTION INTRAVENOUS at 08:24

## 2025-06-10 ENCOUNTER — PATIENT OUTREACH (OUTPATIENT)
Dept: ONCOLOGY | Facility: CLINIC | Age: 70
End: 2025-06-10
Payer: COMMERCIAL

## 2025-06-10 NOTE — TELEPHONE ENCOUNTER
[id number]: Study Follow-Up Note   Subject name: Wenceslao Finch     Date: 6/10/2025    He was contacted by the clinical research coordinator in regards to pt's randomization to the Xaluritamig arm of the trial beginning on Monday.   Left voicemail and emailed per pt's preferred method of contact.     Liat Galindo RN

## 2025-06-13 PROBLEM — C61 HORMONE RESISTANT PROSTATE CANCER (H): Status: ACTIVE | Noted: 2025-06-13

## 2025-06-13 PROBLEM — Z19.2 HORMONE RESISTANT PROSTATE CANCER (H): Status: ACTIVE | Noted: 2025-06-13

## 2025-06-16 ENCOUNTER — HOSPITAL ENCOUNTER (INPATIENT)
Facility: CLINIC | Age: 70
LOS: 1 days | Discharge: HOME | DRG: 847 | End: 2025-06-17
Attending: INTERNAL MEDICINE | Admitting: INTERNAL MEDICINE
Payer: COMMERCIAL

## 2025-06-16 ENCOUNTER — ALLIED HEALTH/NURSE VISIT (OUTPATIENT)
Dept: ONCOLOGY | Facility: CLINIC | Age: 70
End: 2025-06-16

## 2025-06-16 DIAGNOSIS — C61 HORMONE RESISTANT PROSTATE CANCER (H): Primary | ICD-10-CM

## 2025-06-16 DIAGNOSIS — Z19.2 HORMONE RESISTANT PROSTATE CANCER (H): Primary | ICD-10-CM

## 2025-06-16 LAB
ABO + RH BLD: NORMAL
ALBUMIN SERPL BCG-MCNC: 4 G/DL (ref 3.5–5.2)
ALP SERPL-CCNC: 48 U/L (ref 40–150)
ALT SERPL W P-5'-P-CCNC: 18 U/L (ref 0–70)
ANION GAP SERPL CALCULATED.3IONS-SCNC: 12 MMOL/L (ref 7–15)
APTT PPP: 21 SECONDS (ref 22–38)
AST SERPL W P-5'-P-CCNC: 24 U/L (ref 0–45)
BASOPHILS # BLD AUTO: 0 10E3/UL (ref 0–0.2)
BASOPHILS NFR BLD AUTO: 0 %
BILIRUB SERPL-MCNC: <0.2 MG/DL
BLD GP AB SCN SERPL QL: NEGATIVE
BUN SERPL-MCNC: 22.9 MG/DL (ref 8–23)
CALCIUM SERPL-MCNC: 10.1 MG/DL (ref 8.8–10.4)
CHLORIDE SERPL-SCNC: 100 MMOL/L (ref 98–107)
CREAT SERPL-MCNC: 1.13 MG/DL (ref 0.67–1.17)
CRP SERPL-MCNC: 8.81 MG/L
EGFRCR SERPLBLD CKD-EPI 2021: 70 ML/MIN/1.73M2
EOSINOPHIL # BLD AUTO: 0 10E3/UL (ref 0–0.7)
EOSINOPHIL NFR BLD AUTO: 0 %
ERYTHROCYTE [DISTWIDTH] IN BLOOD BY AUTOMATED COUNT: 13.9 % (ref 10–15)
FIBRINOGEN PPP-MCNC: 548 MG/DL (ref 170–510)
GLUCOSE SERPL-MCNC: 165 MG/DL (ref 70–99)
HCO3 SERPL-SCNC: 22 MMOL/L (ref 22–29)
HCT VFR BLD AUTO: 35.2 % (ref 40–53)
HGB BLD-MCNC: 11.8 G/DL (ref 13.3–17.7)
IMM GRANULOCYTES # BLD: 0.1 10E3/UL
IMM GRANULOCYTES NFR BLD: 1 %
INR PPP: 0.96 (ref 0.85–1.15)
LACTATE SERPL-SCNC: 2 MMOL/L (ref 0.7–2)
LDH SERPL L TO P-CCNC: 194 U/L (ref 0–250)
LYMPHOCYTES # BLD AUTO: 0.5 10E3/UL (ref 0.8–5.3)
LYMPHOCYTES NFR BLD AUTO: 4 %
MAGNESIUM SERPL-MCNC: 1.9 MG/DL (ref 1.7–2.3)
MCH RBC QN AUTO: 31.2 PG (ref 26.5–33)
MCHC RBC AUTO-ENTMCNC: 33.5 G/DL (ref 31.5–36.5)
MCV RBC AUTO: 93 FL (ref 78–100)
MONOCYTES # BLD AUTO: 0.4 10E3/UL (ref 0–1.3)
MONOCYTES NFR BLD AUTO: 3 %
NEUTROPHILS # BLD AUTO: 11.4 10E3/UL (ref 1.6–8.3)
NEUTROPHILS NFR BLD AUTO: 92 %
NRBC # BLD AUTO: 0 10E3/UL
NRBC BLD AUTO-RTO: 0 /100
PHOSPHATE SERPL-MCNC: 4.1 MG/DL (ref 2.5–4.5)
PLATELET # BLD AUTO: 252 10E3/UL (ref 150–450)
POTASSIUM SERPL-SCNC: 4.1 MMOL/L (ref 3.4–5.3)
PROT SERPL-MCNC: 7 G/DL (ref 6.4–8.3)
PROTHROMBIN TIME: 12.8 SECONDS (ref 11.8–14.8)
PSA SERPL DL<=0.01 NG/ML-MCNC: 305 NG/ML (ref 0–4.5)
RBC # BLD AUTO: 3.78 10E6/UL (ref 4.4–5.9)
RETICS # AUTO: 0.06 10E6/UL (ref 0.03–0.1)
RETICS/RBC NFR AUTO: 1.7 % (ref 0.5–2)
SODIUM SERPL-SCNC: 134 MMOL/L (ref 135–145)
SPECIMEN EXP DATE BLD: NORMAL
URATE SERPL-MCNC: 5.2 MG/DL (ref 3.4–7)
WBC # BLD AUTO: 12.4 10E3/UL (ref 4–11)

## 2025-06-16 PROCEDURE — 36415 COLL VENOUS BLD VENIPUNCTURE: CPT | Performed by: INTERNAL MEDICINE

## 2025-06-16 PROCEDURE — 85610 PROTHROMBIN TIME: CPT | Performed by: PHYSICIAN ASSISTANT

## 2025-06-16 PROCEDURE — 99223 1ST HOSP IP/OBS HIGH 75: CPT | Mod: FS | Performed by: PHYSICIAN ASSISTANT

## 2025-06-16 PROCEDURE — 84100 ASSAY OF PHOSPHORUS: CPT | Performed by: PHYSICIAN ASSISTANT

## 2025-06-16 PROCEDURE — 999N000128 HC STATISTIC PERIPHERAL IV START W/O US GUIDANCE

## 2025-06-16 PROCEDURE — 206N000001 HC R&B BMT UMMC

## 2025-06-16 PROCEDURE — 86140 C-REACTIVE PROTEIN: CPT | Performed by: INTERNAL MEDICINE

## 2025-06-16 PROCEDURE — 82374 ASSAY BLOOD CARBON DIOXIDE: CPT | Performed by: PHYSICIAN ASSISTANT

## 2025-06-16 PROCEDURE — 86900 BLOOD TYPING SEROLOGIC ABO: CPT | Performed by: PHYSICIAN ASSISTANT

## 2025-06-16 PROCEDURE — 3E04305 INTRODUCTION OF OTHER ANTINEOPLASTIC INTO CENTRAL VEIN, PERCUTANEOUS APPROACH: ICD-10-PCS | Performed by: INTERNAL MEDICINE

## 2025-06-16 PROCEDURE — 83605 ASSAY OF LACTIC ACID: CPT | Performed by: INTERNAL MEDICINE

## 2025-06-16 PROCEDURE — 85025 COMPLETE CBC W/AUTO DIFF WBC: CPT | Performed by: PHYSICIAN ASSISTANT

## 2025-06-16 PROCEDURE — 83615 LACTATE (LD) (LDH) ENZYME: CPT | Performed by: PHYSICIAN ASSISTANT

## 2025-06-16 PROCEDURE — 250N000013 HC RX MED GY IP 250 OP 250 PS 637: Performed by: STUDENT IN AN ORGANIZED HEALTH CARE EDUCATION/TRAINING PROGRAM

## 2025-06-16 PROCEDURE — 300N000004 RESEARCH KIT COLLECTION: Performed by: STUDENT IN AN ORGANIZED HEALTH CARE EDUCATION/TRAINING PROGRAM

## 2025-06-16 PROCEDURE — 85384 FIBRINOGEN ACTIVITY: CPT | Performed by: PHYSICIAN ASSISTANT

## 2025-06-16 PROCEDURE — 85045 AUTOMATED RETICULOCYTE COUNT: CPT | Performed by: INTERNAL MEDICINE

## 2025-06-16 PROCEDURE — 250N000013 HC RX MED GY IP 250 OP 250 PS 637: Performed by: PHYSICIAN ASSISTANT

## 2025-06-16 PROCEDURE — 84550 ASSAY OF BLOOD/URIC ACID: CPT | Performed by: PHYSICIAN ASSISTANT

## 2025-06-16 PROCEDURE — 83735 ASSAY OF MAGNESIUM: CPT | Performed by: PHYSICIAN ASSISTANT

## 2025-06-16 PROCEDURE — 85730 THROMBOPLASTIN TIME PARTIAL: CPT | Performed by: PHYSICIAN ASSISTANT

## 2025-06-16 PROCEDURE — 250N000012 HC RX MED GY IP 250 OP 636 PS 637: Performed by: STUDENT IN AN ORGANIZED HEALTH CARE EDUCATION/TRAINING PROGRAM

## 2025-06-16 PROCEDURE — 258N000003 HC RX IP 258 OP 636: Performed by: STUDENT IN AN ORGANIZED HEALTH CARE EDUCATION/TRAINING PROGRAM

## 2025-06-16 PROCEDURE — 250N000011 HC RX IP 250 OP 636: Performed by: PHYSICIAN ASSISTANT

## 2025-06-16 PROCEDURE — 82728 ASSAY OF FERRITIN: CPT | Performed by: STUDENT IN AN ORGANIZED HEALTH CARE EDUCATION/TRAINING PROGRAM

## 2025-06-16 PROCEDURE — 84153 ASSAY OF PSA TOTAL: CPT | Performed by: INTERNAL MEDICINE

## 2025-06-16 RX ORDER — ALBUTEROL SULFATE 0.83 MG/ML
2.5 SOLUTION RESPIRATORY (INHALATION)
Status: DISCONTINUED | OUTPATIENT
Start: 2025-06-16 | End: 2025-06-17 | Stop reason: HOSPADM

## 2025-06-16 RX ORDER — METRONIDAZOLE 500 MG/1
500 TABLET ORAL 3 TIMES DAILY PRN
Qty: 21 TABLET | Refills: 0 | Status: ACTIVE | OUTPATIENT
Start: 2025-06-16

## 2025-06-16 RX ORDER — PREDNISONE 5 MG/1
5 TABLET ORAL DAILY
Status: DISCONTINUED | OUTPATIENT
Start: 2025-06-16 | End: 2025-06-16

## 2025-06-16 RX ORDER — ONDANSETRON 4 MG/1
4 TABLET, ORALLY DISINTEGRATING ORAL EVERY 8 HOURS PRN
Status: DISCONTINUED | OUTPATIENT
Start: 2025-06-16 | End: 2025-06-17 | Stop reason: HOSPADM

## 2025-06-16 RX ORDER — DEXAMETHASONE 4 MG/1
8 TABLET ORAL ONCE
Status: COMPLETED | OUTPATIENT
Start: 2025-06-17 | End: 2025-06-17

## 2025-06-16 RX ORDER — MEPERIDINE HYDROCHLORIDE 25 MG/ML
25 INJECTION INTRAMUSCULAR; INTRAVENOUS; SUBCUTANEOUS
Status: DISCONTINUED | OUTPATIENT
Start: 2025-06-16 | End: 2025-06-17 | Stop reason: HOSPADM

## 2025-06-16 RX ORDER — LORATADINE 10 MG/1
10 TABLET ORAL DAILY
Status: DISCONTINUED | OUTPATIENT
Start: 2025-06-16 | End: 2025-06-16

## 2025-06-16 RX ORDER — ALBUTEROL SULFATE 90 UG/1
1-2 INHALANT RESPIRATORY (INHALATION)
Status: DISCONTINUED | OUTPATIENT
Start: 2025-06-16 | End: 2025-06-17 | Stop reason: HOSPADM

## 2025-06-16 RX ORDER — IBUPROFEN 200 MG
400 TABLET ORAL EVERY 4 HOURS PRN
Status: DISCONTINUED | OUTPATIENT
Start: 2025-06-16 | End: 2025-06-16

## 2025-06-16 RX ORDER — PROCHLORPERAZINE MALEATE 5 MG/1
5 TABLET ORAL EVERY 6 HOURS PRN
Status: DISCONTINUED | OUTPATIENT
Start: 2025-06-16 | End: 2025-06-17 | Stop reason: HOSPADM

## 2025-06-16 RX ORDER — HEPARIN SODIUM,PORCINE 10 UNIT/ML
5-10 VIAL (ML) INTRAVENOUS EVERY 24 HOURS
Status: DISCONTINUED | OUTPATIENT
Start: 2025-06-17 | End: 2025-06-17 | Stop reason: HOSPADM

## 2025-06-16 RX ORDER — IBUPROFEN 200 MG
600 TABLET ORAL EVERY 6 HOURS PRN
Qty: 240 TABLET | Refills: 1 | Status: SHIPPED | OUTPATIENT
Start: 2025-06-16

## 2025-06-16 RX ORDER — LIDOCAINE 40 MG/G
CREAM TOPICAL
Status: DISCONTINUED | OUTPATIENT
Start: 2025-06-16 | End: 2025-06-16 | Stop reason: HOSPADM

## 2025-06-16 RX ORDER — IBUPROFEN 200 MG
400 TABLET ORAL EVERY 8 HOURS PRN
Status: DISCONTINUED | OUTPATIENT
Start: 2025-06-16 | End: 2025-06-17 | Stop reason: HOSPADM

## 2025-06-16 RX ORDER — CIPROFLOXACIN 500 MG/1
500 TABLET, FILM COATED ORAL 2 TIMES DAILY PRN
Qty: 14 TABLET | Refills: 0 | Status: ACTIVE | OUTPATIENT
Start: 2025-06-16

## 2025-06-16 RX ORDER — ACETAMINOPHEN 500 MG
1000 TABLET ORAL EVERY 6 HOURS PRN
Status: DISCONTINUED | OUTPATIENT
Start: 2025-06-16 | End: 2025-06-16 | Stop reason: HOSPADM

## 2025-06-16 RX ORDER — METHYLPREDNISOLONE SODIUM SUCCINATE 40 MG/ML
40 INJECTION INTRAMUSCULAR; INTRAVENOUS
Status: DISCONTINUED | OUTPATIENT
Start: 2025-06-16 | End: 2025-06-16 | Stop reason: HOSPADM

## 2025-06-16 RX ORDER — VENLAFAXINE HYDROCHLORIDE 150 MG/1
150 CAPSULE, EXTENDED RELEASE ORAL EVERY EVENING
COMMUNITY

## 2025-06-16 RX ORDER — AMOXICILLIN 250 MG
1 CAPSULE ORAL 2 TIMES DAILY PRN
Status: DISCONTINUED | OUTPATIENT
Start: 2025-06-16 | End: 2025-06-17 | Stop reason: HOSPADM

## 2025-06-16 RX ORDER — LIDOCAINE 40 MG/G
CREAM TOPICAL
Status: DISCONTINUED | OUTPATIENT
Start: 2025-06-16 | End: 2025-06-17 | Stop reason: HOSPADM

## 2025-06-16 RX ORDER — FAMOTIDINE 20 MG/1
20 TABLET, FILM COATED ORAL EVERY 12 HOURS PRN
Status: DISCONTINUED | OUTPATIENT
Start: 2025-06-16 | End: 2025-06-17 | Stop reason: HOSPADM

## 2025-06-16 RX ORDER — EPINEPHRINE 1 MG/ML
0.3 INJECTION, SOLUTION, CONCENTRATE INTRAVENOUS EVERY 5 MIN PRN
Status: DISCONTINUED | OUTPATIENT
Start: 2025-06-16 | End: 2025-06-17 | Stop reason: HOSPADM

## 2025-06-16 RX ORDER — ACETAMINOPHEN 500 MG
1000 TABLET ORAL EVERY 6 HOURS
Status: DISCONTINUED | OUTPATIENT
Start: 2025-06-16 | End: 2025-06-16

## 2025-06-16 RX ORDER — IBUPROFEN 200 MG
400 TABLET ORAL EVERY 8 HOURS PRN
Status: DISCONTINUED | OUTPATIENT
Start: 2025-06-16 | End: 2025-06-16 | Stop reason: HOSPADM

## 2025-06-16 RX ORDER — EPINEPHRINE 1 MG/ML
0.3 INJECTION, SOLUTION INTRAMUSCULAR; SUBCUTANEOUS EVERY 5 MIN PRN
Status: DISCONTINUED | OUTPATIENT
Start: 2025-06-16 | End: 2025-06-16 | Stop reason: HOSPADM

## 2025-06-16 RX ORDER — POLYETHYLENE GLYCOL 3350 17 G/17G
17 POWDER, FOR SOLUTION ORAL DAILY
Status: DISCONTINUED | OUTPATIENT
Start: 2025-06-16 | End: 2025-06-17 | Stop reason: HOSPADM

## 2025-06-16 RX ORDER — HEPARIN SODIUM,PORCINE 10 UNIT/ML
5-10 VIAL (ML) INTRAVENOUS
Status: DISCONTINUED | OUTPATIENT
Start: 2025-06-16 | End: 2025-06-17 | Stop reason: HOSPADM

## 2025-06-16 RX ORDER — AMOXICILLIN 250 MG
2 CAPSULE ORAL 2 TIMES DAILY PRN
Status: DISCONTINUED | OUTPATIENT
Start: 2025-06-16 | End: 2025-06-17 | Stop reason: HOSPADM

## 2025-06-16 RX ORDER — HEPARIN SODIUM (PORCINE) LOCK FLUSH IV SOLN 100 UNIT/ML 100 UNIT/ML
5-10 SOLUTION INTRAVENOUS
Status: DISCONTINUED | OUTPATIENT
Start: 2025-06-16 | End: 2025-06-17 | Stop reason: HOSPADM

## 2025-06-16 RX ORDER — DIPHENHYDRAMINE HYDROCHLORIDE 50 MG/ML
25 INJECTION, SOLUTION INTRAMUSCULAR; INTRAVENOUS
Status: DISCONTINUED | OUTPATIENT
Start: 2025-06-16 | End: 2025-06-16 | Stop reason: HOSPADM

## 2025-06-16 RX ORDER — DEXAMETHASONE 4 MG/1
8 TABLET ORAL ONCE
Status: DISCONTINUED | OUTPATIENT
Start: 2025-06-16 | End: 2025-06-16 | Stop reason: HOSPADM

## 2025-06-16 RX ORDER — VENLAFAXINE HYDROCHLORIDE 150 MG/1
150 CAPSULE, EXTENDED RELEASE ORAL EVERY EVENING
Status: DISCONTINUED | OUTPATIENT
Start: 2025-06-16 | End: 2025-06-17 | Stop reason: HOSPADM

## 2025-06-16 RX ORDER — METHYLPREDNISOLONE SODIUM SUCCINATE 40 MG/ML
40 INJECTION INTRAMUSCULAR; INTRAVENOUS
Status: DISCONTINUED | OUTPATIENT
Start: 2025-06-16 | End: 2025-06-17 | Stop reason: HOSPADM

## 2025-06-16 RX ORDER — DEXAMETHASONE SODIUM PHOSPHATE 10 MG/ML
8 INJECTION, SOLUTION INTRAMUSCULAR; INTRAVENOUS
Status: DISCONTINUED | OUTPATIENT
Start: 2025-06-16 | End: 2025-06-16 | Stop reason: HOSPADM

## 2025-06-16 RX ORDER — ONDANSETRON 2 MG/ML
4 INJECTION INTRAMUSCULAR; INTRAVENOUS EVERY 8 HOURS PRN
Status: DISCONTINUED | OUTPATIENT
Start: 2025-06-16 | End: 2025-06-17 | Stop reason: HOSPADM

## 2025-06-16 RX ORDER — ACETAMINOPHEN 500 MG
1000 TABLET ORAL EVERY 6 HOURS
Status: DISCONTINUED | OUTPATIENT
Start: 2025-06-16 | End: 2025-06-16 | Stop reason: HOSPADM

## 2025-06-16 RX ORDER — DEXAMETHASONE 4 MG/1
8 TABLET ORAL ONCE
Status: DISCONTINUED | OUTPATIENT
Start: 2025-06-17 | End: 2025-06-16 | Stop reason: HOSPADM

## 2025-06-16 RX ORDER — MEPERIDINE HYDROCHLORIDE 25 MG/ML
25 INJECTION INTRAMUSCULAR; INTRAVENOUS; SUBCUTANEOUS
Status: DISCONTINUED | OUTPATIENT
Start: 2025-06-16 | End: 2025-06-16 | Stop reason: HOSPADM

## 2025-06-16 RX ORDER — ACETAMINOPHEN 500 MG
1000 TABLET ORAL EVERY 6 HOURS PRN
Status: DISCONTINUED | OUTPATIENT
Start: 2025-06-16 | End: 2025-06-17 | Stop reason: HOSPADM

## 2025-06-16 RX ORDER — DIPHENHYDRAMINE HYDROCHLORIDE 50 MG/ML
50 INJECTION, SOLUTION INTRAMUSCULAR; INTRAVENOUS
Status: DISCONTINUED | OUTPATIENT
Start: 2025-06-16 | End: 2025-06-16 | Stop reason: HOSPADM

## 2025-06-16 RX ORDER — DIPHENHYDRAMINE HYDROCHLORIDE 50 MG/ML
25 INJECTION, SOLUTION INTRAMUSCULAR; INTRAVENOUS
Status: DISCONTINUED | OUTPATIENT
Start: 2025-06-16 | End: 2025-06-17 | Stop reason: HOSPADM

## 2025-06-16 RX ORDER — DIPHENHYDRAMINE HYDROCHLORIDE 50 MG/ML
50 INJECTION, SOLUTION INTRAMUSCULAR; INTRAVENOUS
Status: DISCONTINUED | OUTPATIENT
Start: 2025-06-16 | End: 2025-06-17 | Stop reason: HOSPADM

## 2025-06-16 RX ORDER — FAMOTIDINE 20 MG/1
20 TABLET, FILM COATED ORAL EVERY 12 HOURS PRN
Status: DISCONTINUED | OUTPATIENT
Start: 2025-06-16 | End: 2025-06-16 | Stop reason: HOSPADM

## 2025-06-16 RX ORDER — METHOCARBAMOL 750 MG/1
750 TABLET, FILM COATED ORAL 3 TIMES DAILY PRN
Qty: 90 TABLET | Refills: 5 | Status: SHIPPED | OUTPATIENT
Start: 2025-06-16

## 2025-06-16 RX ORDER — ACETAMINOPHEN 500 MG
1000 TABLET ORAL EVERY 6 HOURS
Status: COMPLETED | OUTPATIENT
Start: 2025-06-16 | End: 2025-06-17

## 2025-06-16 RX ORDER — DEXAMETHASONE 4 MG/1
8 TABLET ORAL ONCE
Status: COMPLETED | OUTPATIENT
Start: 2025-06-16 | End: 2025-06-16

## 2025-06-16 RX ORDER — ALBUTEROL SULFATE 0.83 MG/ML
2.5 SOLUTION RESPIRATORY (INHALATION)
Status: DISCONTINUED | OUTPATIENT
Start: 2025-06-16 | End: 2025-06-16 | Stop reason: HOSPADM

## 2025-06-16 RX ORDER — DEXAMETHASONE SODIUM PHOSPHATE 10 MG/ML
8 INJECTION, SOLUTION INTRAMUSCULAR; INTRAVENOUS
Status: DISCONTINUED | OUTPATIENT
Start: 2025-06-16 | End: 2025-06-17 | Stop reason: HOSPADM

## 2025-06-16 RX ORDER — ALBUTEROL SULFATE 90 UG/1
1-2 INHALANT RESPIRATORY (INHALATION)
Status: DISCONTINUED | OUTPATIENT
Start: 2025-06-16 | End: 2025-06-16 | Stop reason: HOSPADM

## 2025-06-16 RX ORDER — FAMOTIDINE 20 MG/1
20 TABLET, FILM COATED ORAL 2 TIMES DAILY PRN
Qty: 60 TABLET | Refills: 5 | Status: SHIPPED | OUTPATIENT
Start: 2025-06-16

## 2025-06-16 RX ADMIN — VENLAFAXINE HYDROCHLORIDE 150 MG: 150 CAPSULE, EXTENDED RELEASE ORAL at 20:15

## 2025-06-16 RX ADMIN — ACETAMINOPHEN 1000 MG: 500 TABLET ORAL at 12:51

## 2025-06-16 RX ADMIN — DEXAMETHASONE 8 MG: 4 TABLET ORAL at 12:51

## 2025-06-16 RX ADMIN — Medication 5 ML: at 18:33

## 2025-06-16 RX ADMIN — Medication 5 ML: at 20:32

## 2025-06-16 RX ADMIN — SODIUM CHLORIDE 1000 ML: 9 INJECTION, SOLUTION INTRAVENOUS at 12:20

## 2025-06-16 RX ADMIN — ACETAMINOPHEN 1000 MG: 500 TABLET ORAL at 18:32

## 2025-06-16 ASSESSMENT — ACTIVITIES OF DAILY LIVING (ADL)
ADLS_ACUITY_SCORE: 19
ADLS_ACUITY_SCORE: 18
ADLS_ACUITY_SCORE: 41
ADLS_ACUITY_SCORE: 41
ADLS_ACUITY_SCORE: 19
ADLS_ACUITY_SCORE: 18
ADLS_ACUITY_SCORE: 19
ADLS_ACUITY_SCORE: 19
ADLS_ACUITY_SCORE: 18
ADLS_ACUITY_SCORE: 19
ADLS_ACUITY_SCORE: 41
ADLS_ACUITY_SCORE: 19
ADLS_ACUITY_SCORE: 18
ADLS_ACUITY_SCORE: 19

## 2025-06-16 NOTE — PHARMACY-ADMISSION MEDICATION HISTORY
Pharmacist Admission Medication History    Admission medication history is complete. The information provided in this note is only as accurate as the sources available at the time of the update.    Information Source(s): Spoke to patient and family; Reviewed medication dispense history (Sure Scripts)    Pertinent Information: Only requested his venlafaxine for tonight     Changes made to PTA medication list:  Added: None  Deleted: Leuporlide, Pluvicto, Nubeqa, prednisone  Changed: None    Prior to Admission medications    Medication Sig Last Dose Taking? Auth Provider Long Term End Date   dexAMETHasone (DECADRON) 4 MG tablet Take 2 tablets (8 mg) by mouth See Admin Instructions. Take 2 tablets (8 mg) the evening (approximately 12-16 hours) prior to the first 4 xaluritamig doses. Record time taken. 6/16/2025 Yes Servando Boss MD Yes    venlafaxine (EFFEXOR XR) 150 MG 24 hr capsule Take 150 mg by mouth every evening. 6/15/2025 Evening Yes Unknown, Entered By History Yes    acetaminophen (TYLENOL) 500 MG tablet Take 2 tablets (1,000 mg) by mouth every 6 hours. Take for the first 24 hours after discharge for all xaluritamig doses in Cycle 1. Maximum 4000 mg in 24 hours. Unknown  Servando Boss MD     calcium carbonate 500 mg, elemental, (OSCAL 500) 1250 (500 Ca) MG TABS tablet Take 500 mg by mouth daily. Unknown  Reported, Patient     loratadine (CLARITIN) 10 MG tablet Take 10 mg by mouth daily. Unknown  Reported, Patient     vitamin D3 (CHOLECALCIFEROL) 50 mcg (2000 units) tablet Take 1 tablet by mouth 2 times daily. Unknown  Reported, Patient     vitamin E (TOCOPHEROL) 400 units (180 mg) capsule Take 400 Units by mouth daily. Unknown  Reported, Patient       Medication History Completed By: Adrian Tijerina RPH 6/16/2025 2:19 PM

## 2025-06-16 NOTE — PLAN OF CARE
"BP (!) 166/90 (BP Location: Right arm)   Pulse 89   Temp 98.1  F (36.7  C) (Oral)   Resp 16   Ht 1.815 m (5' 11.46\")   Wt 122.8 kg (270 lb 11.2 oz)   SpO2 99%   BMI 37.27 kg/m      Afebrile. BP hypertensive and provider (Ceci Muñoz) notified. No interventions at this time unless pt becomes symptomatic. Other VSS. Denies pain/nausea. Up ind. Pt declined unit tour at this time. Voiding adequately. No BM yet today. Cdiff needed. R port HL. R PIV SL. Research kit done x2 and next needed at 2011 +/- 30 mins. 1L bolus started prior to study drug and finished. ICE 10/10 and needed qshift. Wife at bedside and planning to spend the night. Will continue with POC.     Problem: Adult Inpatient Plan of Care  Goal: Plan of Care Review  Outcome: Progressing  Flowsheets (Taken 6/16/2025 1848)  Outcome Evaluation: Pt admitted to unit and received study drug without complications. Plan for discharge tomorrow.  Plan of Care Reviewed With: patient  Overall Patient Progress: no change     Problem: Oncology Care  Goal: Effective Coping  Outcome: Progressing  Intervention: Support and Enhance Coping Strategies  Recent Flowsheet Documentation  Taken 6/16/2025 0936 by Shadia Stafford RN  Family/Support System Care: support provided  Goal: Improved Activity Tolerance  Outcome: Progressing  Intervention: Promote Improved Energy  Recent Flowsheet Documentation  Taken 6/16/2025 0936 by Shadia Stafford RN  Activity Management:   activity adjusted per tolerance   up ad yamila  Goal: Optimal Oral Intake  Outcome: Progressing  Goal: Improved Oral Mucous Membrane Integrity  Outcome: Progressing  Goal: Optimal Pain Control and Function  Outcome: Progressing  Intervention: Prevent or Manage Pain  Recent Flowsheet Documentation  Taken 6/16/2025 1600 by Shadia Stafford RN  Medication Review/Management: medications reviewed  Taken 6/16/2025 1200 by Shadia Stafford RN  Medication Review/Management: medications reviewed  Taken 6/16/2025 0936 by " Shadia Stafford, RN  Sensory Stimulation Regulation:   care clustered   lighting decreased   quiet environment promoted  Medication Review/Management: medications reviewed

## 2025-06-16 NOTE — H&P
[facility]    History & Physical  Hematology / Oncology     Date of Admission:  6/16/2025  Date of Service (when I saw the patient):  06/16/2025    Assessment & Plan   Wenceslao Finch is a 70 year old male with a past medical history significant for prostate cancer (diagnosed 2016) previously s/p prostatectomy, salvage radiotherapy, chemotherapy, and Pluvicto, with recent evidence of rising PSA as well as progression on PSMA PET, who was admitted for planned C1D1 of HFT170 per the Phase 3 clinical trial.    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 Boss on 5/19/25, for further details. In brief, he was diagnosed in 11/2016. Initial Coleman score 4+3=7, s/p prostatectomy, staged at pT2. PSA became detectable again and he underwent salvage radiation with Dr. Irving ending 12/2018, along with starting 2 years of ADT. PSA became detectable again in 07/2020 without any evidence of metastatic disease on PET. He continued progressing on multiple therapies and developed bone mets. He was initiated on Docetaxel/Darolutamide/Prednisone, C1D1=1/17/24, which was complicated by a couple of hospitalizations for neutropenic fever. His last cycle was given on 9/11/24. Unfortunately, PSMA PET in 09/2024 showed disease progression, following which he was started on Pluvicto. He received 4 cycles of Pluvicto, last on 4/1/25. His PSA continued to rise, and another PSMA PET on 4/8/25 showed numerous new PSMA avid osseous lesions, including bony lesions of the left proximal humerus, left iliac wing, right acetabular roof, and right sacrum. He was continued on Eligard, last given 4/30/25, and Darolutamide 600 mg BID. Given his continued progression, he was referred to [facility] and enrolled on the ENI131 Phase 3 trial. He was admitted on 6/16/25 for  "planned C1D1 of [id number].  - Continue APAP 1000 mg Q6H scheduled  - Anti-androgen therapy and prednisone discontinued prior to admission    Treatment Plan: AMG-509 biochemical relapse study, C1D1=6/16/25    - AMG-509 (Xaluritamig) 0.1 mg IV over 60 minutes -- D1  - Dexamethasone 8 mg PO -- morning prior to admission, 1 hour prior to infusion, and 18 hours after infusion prior to discharge  - Acetaminophen 1000 mg Q6H PO -- starting 1 hour prior and continue 24 hours s/p discharge    # Risk for CRS  Continue to monitor per protocol throughout admission. See CRS guidelines table below for management. Please contact the PI, Dr. Servando Boss, with any concerns for CRS.  - Please note: for the treatment of CRS requiring tocilizumab, the provider must release the signed & held tocilizumab order (IDS [id number]), located under order set \"Research Plan CRS Management of Xaluritamig.\" No biosimilar products (i.e. Tyenee) are allowed per the research protocol. There is a study-specific supply of tocilizumab available for this purpose.       CRS Guidelines for Xaluritamig :  CRS  Grade Defining  Symptoms Management   Grade 1 Fever (Temperature >=38 C) without hypotension and hypoxia Consider the risk for infection before  treating CRS. This is a clinical  assessment. If suspicion is high but  CRS is still considered, treat both  infection with appropriate antibiotics and  CRS per the schedule below:  ? Administer acetaminophen/ paracetamol 1000 mg (if more than 6 hours since last dose) for 24 to 48 hours.  ?  Consider using tocilizumabb 8 mg/kg (max 800 mg) (or equivalent) for  1 dose for fever (temperature  >= 38C) persisting > 48 hours OR  fever (temperature >= 38C) with an  onset within 2 hours from EOI.  ?  For subject with high cardiac-risk or  extensive co-morbidities at baseline  or prolonged CRS lasting > 48 hours  or onset within first 2 hours from  EOI, manage as per grade 2 CRS  guidance below.      Grade 2 Fever " (Temperature >=38 C) with:     ?  Hypotension not requiring vasopressor     AND/OR     ?  Hypoxia requiring low-flow </=6 L/min nasal cannula or blow-by. Consider the risk for infection before  treating CRS. This is a clinical  assessment. If suspicion is high but  CRS is still considered, treat both  infection with appropriate antibiotics and CRS per the schedule below:     Step 1:  ? Administer  acetaminophen/paracetamol  1000 mg (if more than 6 hours  since last dose) for 24 to  48 hours.  ? And administer dexamethasone 8 mg.      If symptoms resolved within 4 hours:  Continue:  ? Acetaminophen/paracetamol  1000 mg every 6 hours for  48 hours.  ? Dexamethasone 8 mg BID for  48 hours.     If symptoms NOT resolved within 4 hours:  Step 2:  ? Tocilizumabb 8 mg/kg (max  800 mg) (or equivalent).  Repeat after 8 hours, as  needed, based on clinical  assessment.  ?  And maintain dexamethasone  8 mg BID for 48 hours.       Grade 3 Fever (temperature >= 38C) with:  ? Hypotension requiring vasopressors with or without  Vasopressin     AND/OR     ? Hypoxia requiring high-flow  (> 6 L/min) nasal cannula, facemask, nonrebreather mask, or  Venturi mask. In addition to Grade 2 treatment:  ? Recommend intensive monitoring, e.g., ICU care.  ? Administer dexamethasone (Or equivalent) 8 mg IV every 8 hours up to 3 doses.  ? Vasopressor support as needed.  ? High flow oxygen support as needed.  ? Recommend tocilizumab     Grade 4 Fever (temperature >= 38 C) with:  ? Hypotension requiring multiple  vasopressors (excluding  vasopressin)     AND/OR     ? Hypoxia requiring positive pressure (eg, CPAP, BiPAP, intubation and mechanical  ventilation). Admit to intensive care unit for  close clinical and vital sign  monitoring per institutional  guidelines.  ? Administer Tocilizumab 8 mg/kg (max 800 mg). Repeat after  8 hours, as needed, based on  clinical assessment.  ? Administer dexamethasone  (Or equivalent) 8 mg IV every  8 hours for 3  doses not to  exceed 24 mg in 1 day. Further  corticosteroid use should be  discussed with the [employer]  medical monitor.         # Risk for ICANS and neurotoxicity  Monitor with neuro checks and ICANS assessments every shift. If ICANS score is </= 9, then increase frequency of neuro checks to every 4 hours until returned to baseline. Management as below:  Grading and management of ICANS:  ICE score 7-9 or depressed level of consciousness awaking spontaneously: monitor, consider neurology consultation  ICE score 3-6 or depressed level of consciousness awaking to voice:   Dexamethasone 8 to 10 mg IV every 12 hours or methylprednisolone equivalent. Once ICANS improves to grade 1 or less, taper and or stop corticosteroids depending on clinical situation.  Currently on Keppra 500 mg BID, consider neurology consultation.  ICE score 0-2, depressed level of consciousness awaking to tactile stimuli, or any seizure:   Administer dexamethasone as above.  Currently on Keppra 500 mg BID, consider neurology consultation.  ICE score 0, depressed level of consciousness (stupor/coma), life-threatening seizures, motor findings, cerebral edema, or other severe neurological s/sx:  Administer dexamethasone as above OR give methylprednisolone 1000 mg daily x3 days; if improves, then manage as above.  Currently on Keppra 500 mg BID, consider neurology consultation.     # Risk for musculoskeletal inflammation  Typically noted days following administration of Xaluritamig. May localize, but is not limited to, areas of past injury. MSK inflammation events including but not limited to myalgia, myositis, myofascitis, muscle weakness, arthralgia, and soft tissue swelling including orbital oedema and genital/scrotal oedema.  - Note: Any MSK inflammatory event as symptom of CRS (ie, onset within 48 hours of onset of CRS) should be managed per CRS guidelines as above.     Grade Management   Grade 1 Initiate/continue paracetamol/acetaminophen 1000 mg  6-hourly and ibuprofen 400 mg 8-hourly with gastric protection (eg, omeprazole 20 mg 12-hourly) or other pain medication with or without corticosteroids, for 24 to 48 hours, as per local standard of care.  ? If no relief in 24 to 48 hours, consider starting corticosteroids.  Recommend dexamethasone 8 mg QD (or prednisone 0.5 mg/kg QD).   Grade 2 Initiate/continue paracetamol/acetaminophen 1000 mg 6-hourly and ibuprofen 400 mg 8-hourly with gastric protection (eg, omeprazole 20 mg  12-hourly) or other pain medication with or without corticosteroids, for 24 hours, as per local standard of care.  ? If no relief in 24 hours, consider adding corticosteroids as soon as possible. Recommend dexamethasone 8 mg QD or prednisone 0.5 to 1 mg/kg QD or equivalent corticosteroid dose. Continue corticosteroids at  this dose until resolution to grade 1 or less, for maximal 72 hours. Taper corticosteroids gradually over 1 to 2 weeks as per local standard of care.  ? If the MSK inflammation event is associated with CRS or CRP is elevated and treatment with corticosteroids do not result in resolution to grade 1 or less within approximately 24 to 48 hours, consider administration of tocilizumab at a dose of 8 mg/kg (max 800 mg) (or equivalent). Repeat after 8 hours, as needed, based on clinical  assessment.  ? Administer additional pain medications at the discretion of the investigators according to the local standard of care.  ? Consider performing CT/MRI and/or electromyography of the affected muscle and performing a muscle biopsy of abnormal muscular tissue on MRI if accessible and safe to perform such a procedure.   Grade 3 Initiate/continue paracetamol/acetaminophen 1000 mg 6-hourly and ibuprofen 400 mg 8-hourly with gastric protection (eg, omeprazole 20 mg 12-hourly) or other pain medication, as per local standard of care.  ? Initiate corticosteroids: Dexamethasone 8 mg BID or prednisone 1 to 2 mg/kg QD. Continue corticosteroids  at this dose until resolution to grade  1 or less, for maximal 72 hours. Taper corticosteroids gradually over 1 to 2 weeks as per local standard of care.  ? If the MSK inflammation event is associated with CRS or CRP is elevated and treatment with corticosteroids do not result in resolution to grade 1 or less within approximately 24 to 48 hours, consider administration of tocilizumab at a dose of 8 mg/kg (max 800 mg) (or equivalent). Repeat after 8 hours, as needed, based on clinical assessment.  ? Administer additional pain medications at the discretion of the investigators according to the local standard of care.  ? Consider performing CT/MRI and/or electromyography of the affected muscle of the affected muscle and performing a muscle biopsy of abnormal muscular tissue on MRI if accessible and safe to perform such a procedure.      CHRONIC/MISC  # Hyponatremia  Mild, Na 134, noted on admission. Asymptomatic.  - Trend BMP    # Depression - continue PTA venlafaxine    # History of recurrent perirectal abscesses  # Perianal fistula  Previously requiring I&D by CRS at OSH (most recently in 09/2024). Note made of an apparent perianal fistula seen on most recent PSMA PET (4/9/25). Patient asymptomatic on admission.  - Monitor clinically    # Chronic low back pain - seeing PT weekly, not on any pain medications PTA. Will represent possible site of potential future MSK toxicity due to [id number]; monitor closely.    # Chronic bilateral knee pain - reports history of previous menisectomy, pain controlled with activity modifications. Will represent possible sites of potential future MSK toxicity; monitor closely.     # Bilateral pulmonary nodules - noted on previous imaging, most recently OSH chest CT (07/26/24). Non-FDG avid, presumed benign.    # Coronary artery disease - radiographic evidence of CAD noted on most recent PSMA PET (04/2025). No previous history of PCI or stents.     FEN  - IVF per chemotherapy regimen  - Lyte  "replacement per protocol  - Regular diet as tolerated    Prophy/Misc  - GI: No current indication for stress ulcer ppx  - DVT: Defer enoxaparin given anticipated short inpatient stay; start if stay extended  - Bowels: Senna/Miralax PRN  - Activity: Encourage ambulation    Clinically Significant Risk Factors Present on Admission         # Hyponatremia: Lowest Na = 134 mmol/L in last 2 days, will monitor as appropriate                     # Obesity: Estimated body mass index is 37.27 kg/m  as calculated from the following:    Height as of this encounter: 1.815 m (5' 11.46\").    Weight as of this encounter: 122.8 kg (270 lb 11.2 oz).                 Disposition: Discharge to home on completion of chemotherapy/post-infusion monitoring period and barring any clinical complications, anticipated 6/17/25.    Staffed with Dr. Galindo.    90 MINUTES SPENT BY ME on the date of service doing chart review, history, exam, documentation & further activities per the note.       Tiffanie Luna PA-C  Hematology/Oncology  Pager: [phone]    Code Status : Full Code (confirmed on admission). Of note, patient expressed that he would not want to be kept alive by artificial means if there were no hope of meaningful recovery.     Primary Care Physician   Harshal Nicholas    History of Present Illness   History obtained from chart and discussed with the patient.    Wenceslao Finch is a 70 year old male with a past medical history significant for prostate cancer (diagnosed 2016) previously s/p prostatectomy, salvage radiotherapy, chemotherapy, and Pluvicto, with recent evidence of rising PSA as well as progression on PSMA PET, who was admitted for planned C1D1 of HXI450 per the Phase 3 clinical trial.    On admission, Burt is accompanied by his wife, Gloria Alvarado. He reports doing very well. He is still working (works as a ) and was working on a project for 10+ hours yesterday. He denies any significant bony pain. He does intermittently " have some low back pain and bilateral knee pain and sees PT once a week, which really helps. His energy and appetite are good. He denies nausea, vomiting, diarrhea, joint swelling, fevers, or other concerns. We reviewed potential toxicities of DLX055 to include CRS, ICANS, MSK toxicity, organ dysfunction, and more. He understands that adverse effects may be severe or life-threatening and could require inpatient treatment or extended hospital stay. Other questions answered to their stated satisfaction. Burt is eager to proceed.    Past Medical History    No past medical history on file.    Past Surgical History   No past surgical history on file.    Prior to Admission Medications   Prior to Admission Medications   Prescriptions Last Dose Informant Patient Reported? Taking?   EFFEXOR  MG 24 hr capsule   Yes No   Sig: Take 150 mg by mouth daily.   NUBEQA 300 MG tablet   Yes No   acetaminophen (TYLENOL) 500 MG tablet   No No   Sig: Take 2 tablets (1,000 mg) by mouth every 6 hours. Take for the first 24 hours after discharge for all xaluritamig doses in Cycle 1. Maximum 4000 mg in 24 hours.   calcium carbonate 500 mg, elemental, (OSCAL 500) 1250 (500 Ca) MG TABS tablet   Yes No   Sig: Take 500 mg by mouth.   dexAMETHasone (DECADRON) 4 MG tablet   No No   Sig: Take 2 tablets (8 mg) by mouth See Admin Instructions. Take 2 tablets (8 mg) the evening (approximately 12-16 hours) prior to the first 4 xaluritamig doses. Record time taken.   ibuprofen (ADVIL/MOTRIN) 200 MG tablet   Yes No   Sig: Take 200 mg by mouth every 4 hours as needed for pain.   leuprolide, 4 Month, (ELIGARD) 30 MG   Yes No   loratadine (CLARITIN) 10 MG tablet   Yes No   Sig: Take 10 mg by mouth daily.   lutetium Erma 177 vipivotide tetraxetan (PLUVICTO) 27 mCi/mL radioisotope injection   Yes No   Sig: Inject 200 millicuries into the vein.   Patient not taking: Reported on 5/19/2025   predniSONE (DELTASONE) 5 MG tablet   Yes No   Sig: Take 5 mg by  mouth.   vitamin D3 (CHOLECALCIFEROL) 50 mcg (2000 units) tablet   Yes No   Sig: Take 1 tablet by mouth daily.   vitamin E (TOCOPHEROL) 400 units (180 mg) capsule   Yes No   Sig: Take 400 Units by mouth daily.      Facility-Administered Medications: None     Allergies   No Known Allergies    Social History   Social History     Socioeconomic History    Marital status:      Spouse name: Not on file    Number of children: Not on file    Years of education: Not on file    Highest education level: Not on file   Occupational History    Not on file   Tobacco Use    Smoking status: Never     Passive exposure: Never    Smokeless tobacco: Never   Substance and Sexual Activity    Alcohol use: Yes     Alcohol/week: 1.0 standard drink of alcohol     Types: 1 Cans of beer per week    Drug use: Never    Sexual activity: Not on file   Other Topics Concern    Not on file   Social History Narrative    Not on file     Social Drivers of Health     Financial Resource Strain: Low Risk  (7/23/2024)    Received from [facility]    Financial Resource Strain     Difficulty of Paying Living Expenses: 3     Difficulty of Paying Living Expenses: Not on file   Food Insecurity: No Food Insecurity (9/22/2024)    Received from [facility]    Food Insecurity     Do you worry your food will run out before you are able to buy more?: 1   Transportation Needs: No Transportation Needs (9/22/2024)    Received from [facility]    Transportation Needs     Does lack of transportation keep you from medical appointments?: 1     Does lack of transportation keep you from work, meetings or getting things that you need?: 1   Physical Activity: Not on file   Stress: Not on file   Social Connections: Socially Integrated (9/22/2024)    Received from [facility]    Social Connections     Do you often feel lonely or isolated from  those around you?: 0   Interpersonal Safety: Not on file   Housing Stability: Low Risk  (9/22/2024)    Received from [facility] [facility] [facility]    Housing Stability     What is your housing situation today?: 1       Family History   No family history on file.    Review of Systems   A 14-point ROS is negative unless otherwise noted above in the HPI.    Physical Exam   Vital Signs with Ranges  Temp:  [98.1  F (36.7  C)] 98.1  F (36.7  C)  Resp:  [16] 16  BP: (141)/(91) 141/91  SpO2:  [97 %] 97 %  270 lbs 11.2 oz    Constitutional: Pleasant and cooperative male. Awake, alert, NAD. Seated in a chair, smiling and conversational.  HEENT: NC/AT, EOMI, sclera clear, conjunctiva normal, OP with MMM  Respiratory: No increased work of breathing, CTAB, no crackles or wheezing.  Cardiovascular: RRR, no murmur noted. Trace peripheral edema.  GI: Normal bowel sounds, soft, non-distended and non-tender.  MSK: No large joint effusions or gross deformities. No tenderness to palpation over the bones of the L-spine.  Skin: Warm, dry, well-perfused. No bruising, bleeding, rashes, or lesions on limited exam.  Neurologic: A&O. Answers questions appropriately. Moves all extremities spontaneously.  Psych: Calm, appropriate affect  Vascular access:  Port on right chest wall CDI, non-tender, no surrounding erythema.    Recent Labs  CBC   Recent Labs   Lab 06/16/25  1055   WBC 12.4*   RBC 3.78*   HGB 11.8*   HCT 35.2*   MCV 93   MCH 31.2   MCHC 33.5   RDW 13.9          CMP   Recent Labs   Lab 06/16/25  1055   *   POTASSIUM 4.1   CHLORIDE 100   CO2 22   ANIONGAP 12   *   BUN 22.9   CR 1.13   GFRESTIMATED 70   EARL 10.1   MAG 1.9   PHOS 4.1   PROTTOTAL 7.0   ALBUMIN 4.0   BILITOTAL <0.2   ALKPHOS 48   AST 24   ALT 18       LFTs:   Recent Labs   Lab 06/16/25  1055   PROTTOTAL 7.0   ALBUMIN 4.0   BILITOTAL <0.2   ALKPHOS 48   AST 24   ALT 18       Coagulation Studies: No lab results found in last 7 days.

## 2025-06-16 NOTE — PROGRESS NOTES
Patient admitted to: 5C  Admitted from: Home  Arrived by: Ambulation  Reason for admission: Study drug phase  Patient accompanied by: Wife, Gloria Alvarado  Belongings: Clothing, phone  Teaching: Pt oriented to unit and routine. Study drug administered and further questions answered by research RN.   Skin double check completed by: Harriett Rick RN  Scattered bruising noted on b/t lower arms. X2 skin tears on R arm near elbow and is scabbed over. Fistula present and assessed w/no obvious s/s infection. Will continue with POC.

## 2025-06-16 NOTE — PROGRESS NOTES
RN neurotox assessment    Orientation-year, month, city, hospital 4pts: 4    Naming-ability to name 3 objects 3pts: 3    Following commands-ability to follow commands 1pt: 1    Writing-ability to write sentence 1pt: 1    Attention-ct back from 100 by 10 1pt: 1    Total: 10/10

## 2025-06-16 NOTE — PROGRESS NOTES
[id number]: Study Visit Note   Subject name: Wenceslao Finch     Visit: C1D1    Did the study visit occur within the appropriate window allowed by the protocol? yes    Since the last study visit, He has been doing well. No new illnesses, symptoms or complaints since his last visit. Pt's energy level is at baseline.    I have personally interviewed Wenceslao Finch and reviewed his medical record for adverse events and concomitant medications and these have been recorded on the corresponding logs in Wenceslao Finch's research file.     Wenceslao Finch was given the opportunity to ask any trial related questions.  Please see provider progress note for physical exam and other clinical information. Labs were reviewed - any significant lab values were addressed and reviewed.    Performance Status Scale:  Eastern Cooperative Oncology Group (ECOG) Performance Status 0     Eastern Cooperative Oncology Group (ECOG) Performance Status  GRADE DESCRIPTION  0 Fully active, able to carry on all pre-disease performance without restriction  1 Restricted in physically strenuous activity but ambulatory and able to carry out work of a light  2 Ambulatory and capable of all selfcare but unable to carry out any work activities; up and about  more than 50% of waking hours  3 Capable of only limited selfcare; confined to bed or chair more than 50% of waking hours  4 Completely disabled; cannot carry on any selfcare; totally confined to bed or chair  5 Dead      Liat Galindo RN       I did the following education from the protocol for both the caregiver and patient:      subject on signs and symptoms of CRS.  Instruct the subject to contact site or go to the emergency room if the subject experiences fever  100.4F/38.0C) or any other  symptoms of CRS.  Remind the subject of the importance of having someone (caregiver) with them who is well informed about their treatment plan  and what to expect after the subject is  discharged.  Instruct the subject that they are required to stay within 1 hour of a hospital facility for a minimum of 24 hours after EOI.  Advise the subject to remain well hydrated; 11 cups or 2.5 L of daily fluid consumption is recommended.

## 2025-06-17 ENCOUNTER — ALLIED HEALTH/NURSE VISIT (OUTPATIENT)
Dept: ONCOLOGY | Facility: CLINIC | Age: 70
End: 2025-06-17
Payer: COMMERCIAL

## 2025-06-17 VITALS
WEIGHT: 273.7 LBS | RESPIRATION RATE: 18 BRPM | HEART RATE: 91 BPM | HEIGHT: 71 IN | DIASTOLIC BLOOD PRESSURE: 71 MMHG | SYSTOLIC BLOOD PRESSURE: 120 MMHG | OXYGEN SATURATION: 97 % | BODY MASS INDEX: 38.32 KG/M2 | TEMPERATURE: 98.5 F

## 2025-06-17 DIAGNOSIS — Z19.2 METASTATIC CASTRATION-RESISTANT ADENOCARCINOMA OF PROSTATE (H): ICD-10-CM

## 2025-06-17 DIAGNOSIS — C61 HORMONE RESISTANT PROSTATE CANCER (H): Primary | ICD-10-CM

## 2025-06-17 DIAGNOSIS — C61 METASTATIC CASTRATION-RESISTANT ADENOCARCINOMA OF PROSTATE (H): ICD-10-CM

## 2025-06-17 DIAGNOSIS — D49.89 NEOPLASM OF UNSPECIFIED BEHAVIOR OF OTHER SPECIFIED SITES: ICD-10-CM

## 2025-06-17 DIAGNOSIS — Z19.2 HORMONE RESISTANT PROSTATE CANCER (H): Primary | ICD-10-CM

## 2025-06-17 PROBLEM — E87.1 HYPONATREMIA: Status: ACTIVE | Noted: 2025-06-17

## 2025-06-17 LAB
ALBUMIN SERPL BCG-MCNC: 3.5 G/DL (ref 3.5–5.2)
ALP SERPL-CCNC: 40 U/L (ref 40–150)
ALT SERPL W P-5'-P-CCNC: 14 U/L (ref 0–70)
ANION GAP SERPL CALCULATED.3IONS-SCNC: 11 MMOL/L (ref 7–15)
AST SERPL W P-5'-P-CCNC: 24 U/L (ref 0–45)
BASOPHILS # BLD AUTO: 0 10E3/UL (ref 0–0.2)
BASOPHILS NFR BLD AUTO: 0 %
BILIRUB SERPL-MCNC: <0.2 MG/DL
BUN SERPL-MCNC: 24.7 MG/DL (ref 8–23)
CALCIUM SERPL-MCNC: 8.9 MG/DL (ref 8.8–10.4)
CHLORIDE SERPL-SCNC: 101 MMOL/L (ref 98–107)
CK SERPL-CCNC: 64 U/L (ref 39–308)
CREAT SERPL-MCNC: 1.14 MG/DL (ref 0.67–1.17)
EGFRCR SERPLBLD CKD-EPI 2021: 70 ML/MIN/1.73M2
EOSINOPHIL # BLD AUTO: 0 10E3/UL (ref 0–0.7)
EOSINOPHIL NFR BLD AUTO: 0 %
ERYTHROCYTE [DISTWIDTH] IN BLOOD BY AUTOMATED COUNT: 13.9 % (ref 10–15)
FERRITIN SERPL-MCNC: 272 NG/ML (ref 31–409)
FERRITIN SERPL-MCNC: 299 NG/ML (ref 31–409)
GLUCOSE SERPL-MCNC: 130 MG/DL (ref 70–99)
HCO3 SERPL-SCNC: 20 MMOL/L (ref 22–29)
HCT VFR BLD AUTO: 29.6 % (ref 40–53)
HGB BLD-MCNC: 9.8 G/DL (ref 13.3–17.7)
IMM GRANULOCYTES # BLD: 0.2 10E3/UL
IMM GRANULOCYTES NFR BLD: 2 %
LYMPHOCYTES # BLD AUTO: 0.1 10E3/UL (ref 0.8–5.3)
LYMPHOCYTES NFR BLD AUTO: 1 %
MCH RBC QN AUTO: 30.8 PG (ref 26.5–33)
MCHC RBC AUTO-ENTMCNC: 33.1 G/DL (ref 31.5–36.5)
MCV RBC AUTO: 93 FL (ref 78–100)
MONOCYTES # BLD AUTO: 0.7 10E3/UL (ref 0–1.3)
MONOCYTES NFR BLD AUTO: 5 %
NEUTROPHILS # BLD AUTO: 13.2 10E3/UL (ref 1.6–8.3)
NEUTROPHILS NFR BLD AUTO: 92 %
NRBC # BLD AUTO: 0 10E3/UL
NRBC BLD AUTO-RTO: 0 /100
PLATELET # BLD AUTO: 196 10E3/UL (ref 150–450)
POTASSIUM SERPL-SCNC: 4 MMOL/L (ref 3.4–5.3)
PROT SERPL-MCNC: 6 G/DL (ref 6.4–8.3)
RBC # BLD AUTO: 3.18 10E6/UL (ref 4.4–5.9)
SODIUM SERPL-SCNC: 132 MMOL/L (ref 135–145)
WBC # BLD AUTO: 14.3 10E3/UL (ref 4–11)

## 2025-06-17 PROCEDURE — 250N000013 HC RX MED GY IP 250 OP 250 PS 637: Performed by: PHYSICIAN ASSISTANT

## 2025-06-17 PROCEDURE — 99239 HOSP IP/OBS DSCHRG MGMT >30: CPT | Mod: FS | Performed by: PHYSICIAN ASSISTANT

## 2025-06-17 PROCEDURE — 250N000013 HC RX MED GY IP 250 OP 250 PS 637: Performed by: STUDENT IN AN ORGANIZED HEALTH CARE EDUCATION/TRAINING PROGRAM

## 2025-06-17 PROCEDURE — 250N000011 HC RX IP 250 OP 636: Performed by: PHYSICIAN ASSISTANT

## 2025-06-17 PROCEDURE — 82550 ASSAY OF CK (CPK): CPT | Performed by: PHYSICIAN ASSISTANT

## 2025-06-17 PROCEDURE — 250N000012 HC RX MED GY IP 250 OP 636 PS 637: Performed by: STUDENT IN AN ORGANIZED HEALTH CARE EDUCATION/TRAINING PROGRAM

## 2025-06-17 PROCEDURE — 84075 ASSAY ALKALINE PHOSPHATASE: CPT | Performed by: PHYSICIAN ASSISTANT

## 2025-06-17 PROCEDURE — 250N000011 HC RX IP 250 OP 636: Performed by: INTERNAL MEDICINE

## 2025-06-17 PROCEDURE — 82728 ASSAY OF FERRITIN: CPT | Performed by: STUDENT IN AN ORGANIZED HEALTH CARE EDUCATION/TRAINING PROGRAM

## 2025-06-17 PROCEDURE — 85025 COMPLETE CBC W/AUTO DIFF WBC: CPT | Performed by: PHYSICIAN ASSISTANT

## 2025-06-17 RX ORDER — HEPARIN SODIUM,PORCINE 10 UNIT/ML
5-10 VIAL (ML) INTRAVENOUS
Status: DISCONTINUED | OUTPATIENT
Start: 2025-06-17 | End: 2025-06-17 | Stop reason: HOSPADM

## 2025-06-17 RX ORDER — HEPARIN SODIUM (PORCINE) LOCK FLUSH IV SOLN 100 UNIT/ML 100 UNIT/ML
5-10 SOLUTION INTRAVENOUS
Status: DISCONTINUED | OUTPATIENT
Start: 2025-06-17 | End: 2025-06-17 | Stop reason: HOSPADM

## 2025-06-17 RX ADMIN — DEXAMETHASONE 8 MG: 4 TABLET ORAL at 08:55

## 2025-06-17 RX ADMIN — HEPARIN 5 ML: 100 SYRINGE at 11:37

## 2025-06-17 RX ADMIN — Medication 5 ML: at 06:55

## 2025-06-17 RX ADMIN — ACETAMINOPHEN 1000 MG: 500 TABLET ORAL at 00:20

## 2025-06-17 RX ADMIN — ACETAMINOPHEN 1000 MG: 500 TABLET ORAL at 06:55

## 2025-06-17 RX ADMIN — SENNOSIDES AND DOCUSATE SODIUM 2 TABLET: 50; 8.6 TABLET ORAL at 09:03

## 2025-06-17 ASSESSMENT — ACTIVITIES OF DAILY LIVING (ADL)
ADLS_ACUITY_SCORE: 27

## 2025-06-17 NOTE — PLAN OF CARE
"Goal Outcome Evaluation:             Blood pressure 122/73, pulse 99, temperature 98.1  F (36.7  C), temperature source Oral, resp. rate 18, height 1.815 m (5' 11.46\"), weight 122.8 kg (270 lb 11.2 oz), SpO2 98%.    Pt was hypertensive at the beginning of the shift 157/91, 154/84 and it came down to 122/72 this morning. Temp max 99.9 at midnight and came down to 98.1 this morning.A/O x 4, ICE score was 10/10. Neuro intact He is on scheduled Tylenol and he denies any pain, nausea, vomiting and still no stool yet. He got 2 senna last night. He gets up independently to the bathroom and he is voiding freely.Steady lab and vitals done last evening. Pot-a-cath is heparin locked and PIV is saline locked. Wife is at the bedside supportive of pt she said pt did not sleep well due to the steroid . He may be going home today. Continue to monitor pt and follow plan of care.      Problem: Adult Inpatient Plan of Care  Goal: Plan of Care Review  Description: The Plan of Care Review/Shift note should be completed every shift.  The Outcome Evaluation is a brief statement about your assessment that the patient is improving, declining, or no change.  This information will be displayed automatically on your shift  note.  Outcome: Progressing  Goal: Patient-Specific Goal (Individualized)  Description: You can add care plan individualizations to a care plan. Examples of Individualization might be:  \"Parent requests to be called daily at 9am for status\", \"I have a hard time hearing out of my right ear\", or \"Do not touch me to wake me up as it startles  me\".  Outcome: Progressing  Goal: Absence of Hospital-Acquired Illness or Injury  Outcome: Progressing  Intervention: Identify and Manage Fall Risk  Recent Flowsheet Documentation  Taken 6/17/2025 0500 by Franci Mccollum RN  Safety Promotion/Fall Prevention:   assistive device/personal items within reach   clutter free environment maintained   increased rounding and observation   " increase visualization of patient   lighting adjusted   nonskid shoes/slippers when out of bed   patient and family education  Taken 6/17/2025 0000 by Franci Mccollum RN  Safety Promotion/Fall Prevention:   clutter free environment maintained   increase visualization of patient   lighting adjusted   nonskid shoes/slippers when out of bed   patient and family education   room near nurse's station   room organization consistent   safety round/check completed  Taken 6/16/2025 2000 by Franci Mccollum RN  Safety Promotion/Fall Prevention:   assistive device/personal items within reach   clutter free environment maintained   increased rounding and observation   increase visualization of patient   lighting adjusted   nonskid shoes/slippers when out of bed   patient and family education  Intervention: Prevent Skin Injury  Recent Flowsheet Documentation  Taken 6/17/2025 0500 by Franci Mccollum RN  Body Position:   position changed independently   sitting up in bed  Skin Protection:   adhesive use limited   protective footwear used   tubing/devices free from skin contact   transparent dressing maintained  Taken 6/16/2025 2000 by Franci Mccollum RN  Body Position:   position changed independently   sitting up in bed  Skin Protection:   adhesive use limited   protective footwear used   tubing/devices free from skin contact   transparent dressing maintained  Intervention: Prevent and Manage VTE (Venous Thromboembolism) Risk  Recent Flowsheet Documentation  Taken 6/17/2025 0500 by Franci Mccollum RN  VTE Prevention/Management: compression stockings off  Taken 6/16/2025 2000 by Franci Mccollum RN  VTE Prevention/Management: compression stockings off  Intervention: Prevent Infection  Recent Flowsheet Documentation  Taken 6/17/2025 0500 by Franci Mccollum RN  Infection Prevention:   rest/sleep promoted   hand hygiene promoted  Taken 6/17/2025 0000 by Franci Mccollum RN  Infection Prevention:   hand hygiene  promoted   single patient room provided   visitors restricted/screened   personal protective equipment utilized   rest/sleep promoted   environmental surveillance performed   equipment surfaces disinfected  Taken 6/16/2025 2000 by Franci Mccollum RN  Infection Prevention:   rest/sleep promoted   hand hygiene promoted  Goal: Optimal Comfort and Wellbeing  Outcome: Progressing  Intervention: Provide Person-Centered Care  Recent Flowsheet Documentation  Taken 6/17/2025 0500 by Franci Mccollum, RN  Trust Relationship/Rapport:   care explained   questions answered   questions encouraged   thoughts/feelings acknowledged  Taken 6/16/2025 2000 by Franci Mccollum RN  Trust Relationship/Rapport:   care explained   questions answered   questions encouraged   thoughts/feelings acknowledged

## 2025-06-17 NOTE — PROGRESS NOTES
[id number]: Study Visit Note   Subject name: Wenceslao Finch     Visit: C1D2    Did the study visit occur within the appropriate window allowed by the protocol? yes    Since the last study visit, He has been doing well. He is tired from the hospital stay but no new symptoms or complaints to report at this time. Pt reminded to take night before 8 mg dexamethasone the night prior to his next visit next week.     I have personally interviewed Wenceslao Finch and reviewed his medical record for adverse events and concomitant medications and these have been recorded on the corresponding logs in Wenceslao Finch's research file.     Wenceslao Finch was given the opportunity to ask any trial related questions.  Please see provider progress note for physical exam and other clinical information. Labs were reviewed - any significant lab values were addressed and reviewed.    Liat Galindo RN

## 2025-06-17 NOTE — PLAN OF CARE
Goal Outcome Evaluation:                       RN neurotox assessment     Orientation-year, month, city, hospital 4pts: 4     Naming-ability to name 3 objects 3pts: 3     Following commands-ability to follow commands 1pt: 1     Writing-ability to write sentence 1pt: 1     Attention-ct back from 100 by 10 1pt: 1     Total: 10/10

## 2025-06-17 NOTE — DISCHARGE INSTRUCTIONS
Burt,   Here is what to do on discharge.      Please continue to take tylenol 1000mg every 6 hours for 24 hours AFTER discharge (4 doses in total).    Continue to aggressively hydrate with 11 cups of water or non-caffeinated fluids daily.   Watch your blood pressure and temperature.    Let us know right away if you have a fever of 100.4F or higher, blood pressure with top number less than 100, or oxygen level less than 90%.  During the day reach out to the study team.  After hours and weekends, call me directly at [phone] regardless of the time of day.   Tylenol and ibuprofen are OK if you have muscle aches.    For pain, I typically recommend 1000mg of tylenol every 8 (EIGHT) hours rather than every 6, which is different than your post-discharge dosing regimen.   If you take ibuprofen, you can take 600mg every 6 hours.  You need to take this with famotidine to protect the lining of your stomach.   Let me know if you get worsening pain or symptoms around your fistula.    Let us know right away if you develop a rash.   Keep your home action kit with you if you leave home for more than 2-3 hours.   For routine updates (no fever, low blood pressure, or low oxygen) you can give us updates in [employer] if you can get it to work.

## 2025-06-17 NOTE — PLAN OF CARE
"Pt discharged to: Home  Via: Private car  Time: 1158  Reason not before 11am or 2 hrs after order written: N/a  Accompanied by: Spouse  Belongings: Sent with patient  Teaching: Completed with patient and spouse  Clinic appointment: Scheduled for 6/24  Report called/faxed: N/a  Local housing: Yes    RN neurotox assessment    Orientation-year, month, city, hospital 4pts: 4    Naming-ability to name 3 objects 3pts: 3    Following commands-ability to follow commands 1pt: 1    Writing-ability to write sentence 1pt: 1    Attention-ct back from 100 by 10 1pt: 1    Total: 10/10    Problem: Adult Inpatient Plan of Care  Goal: Plan of Care Review  Description: The Plan of Care Review/Shift note should be completed every shift.  The Outcome Evaluation is a brief statement about your assessment that the patient is improving, declining, or no change.  This information will be displayed automatically on your shift  note.  6/17/2025 1257 by Vanda Potter RN  Outcome: Adequate for Care Transition  Flowsheets (Taken 6/17/2025 1257)  Plan of Care Reviewed With:   patient   spouse  Overall Patient Progress: improving  6/17/2025 1257 by Vanda Potter RN  Outcome: Progressing  Flowsheets (Taken 6/17/2025 1257)  Plan of Care Reviewed With:   patient   spouse  Overall Patient Progress: improving  Goal: Patient-Specific Goal (Individualized)  Description: You can add care plan individualizations to a care plan. Examples of Individualization might be:  \"Parent requests to be called daily at 9am for status\", \"I have a hard time hearing out of my right ear\", or \"Do not touch me to wake me up as it startles  me\".  6/17/2025 1257 by Vanda oPtter RN  Outcome: Adequate for Care Transition  6/17/2025 1257 by Vanda Potter RN  Outcome: Progressing  Goal: Absence of Hospital-Acquired Illness or Injury  6/17/2025 1257 by Vanda Potter RN  Outcome: Adequate for Care Transition  6/17/2025 1257 by Vanda Potter RN  Outcome: " Progressing  Intervention: Identify and Manage Fall Risk  Recent Flowsheet Documentation  Taken 6/17/2025 1100 by Vanda Potter RN  Safety Promotion/Fall Prevention: safety round/check completed  Taken 6/17/2025 1000 by Vanda Potter RN  Safety Promotion/Fall Prevention: safety round/check completed  Taken 6/17/2025 0900 by Vanda Potter RN  Safety Promotion/Fall Prevention:   activity supervised   assistive device/personal items within reach   clutter free environment maintained   increased rounding and observation   increase visualization of patient   nonskid shoes/slippers when out of bed   patient and family education   room near nurse's station   room organization consistent   safety round/check completed  Taken 6/17/2025 0800 by Vanda Potter RN  Safety Promotion/Fall Prevention: safety round/check completed  Intervention: Prevent Skin Injury  Recent Flowsheet Documentation  Taken 6/17/2025 0900 by Vanda Potter RN  Body Position: position changed independently  Intervention: Prevent and Manage VTE (Venous Thromboembolism) Risk  Recent Flowsheet Documentation  Taken 6/17/2025 0900 by Vanda Potter RN  VTE Prevention/Management: compression stockings off  Intervention: Prevent Infection  Recent Flowsheet Documentation  Taken 6/17/2025 0900 by Vanda Potter RN  Infection Prevention:   rest/sleep promoted   hand hygiene promoted   personal protective equipment utilized   visitors restricted/screened   single patient room provided   equipment surfaces disinfected   environmental surveillance performed   cohorting utilized  Goal: Optimal Comfort and Wellbeing  6/17/2025 1257 by Vanda Potter RN  Outcome: Adequate for Care Transition  6/17/2025 1257 by Vanda Potter RN  Outcome: Progressing  Goal: Readiness for Transition of Care  6/17/2025 1257 by Vanda Potter RN  Outcome: Adequate for Care Transition  6/17/2025 1257 by Vanda Potter RN  Outcome: Progressing   Goal Outcome Evaluation:      Plan of Care  Reviewed With: patient, spouse    Overall Patient Progress: improvingOverall Patient Progress: improving

## 2025-06-17 NOTE — DISCHARGE SUMMARY
[facility]  Discharge Summary  Hematology / Oncology    Date of Admission:  6/16/2025  Date of Discharge:  06/17/2025  Discharging Provider: Tiffanie Luna PA-C  Date of Service (when I saw the patient): 06/17/2025    Discharge Diagnoses   Active Problems:    Hormone resistant prostate cancer (H)     History of Present Illness   Wenceslao Finch is a 70 year old male with a past medical history significant for prostate cancer (diagnosed 2016) previously s/p prostatectomy, salvage radiotherapy, chemotherapy, and Pluvicto, with recent evidence of rising PSA as well as progression on PSMA PET, who was admitted for planned C1D1 of MBE309 per the Phase 3 clinical trial. Burt tolerated dose #1 very well, with no immediate clinical evidence of CRS, ICANS, or MSK toxicity. He felt well overall aside from some mild flushing but otherwise had no fevers, hypotension, hypoxia, or other s/sx of concern. He remained clinically stable and was eager to be discharged to home and the conclusion of his monitoring period.    Prior to discharge, I reviewed with Burt the plan of care, including upcoming follow-up appointments and new medications. Appropriate prescriptions were sent to the discharge pharmacy, as needed. We reviewed strict discharge precautions, including reasons to call clinic triage or present to the ED, and he voiced understanding. He was provided with the clinic triage number, as well as written discharge instructions, in his discharge paperwork. Patient had an opportunity to ask questions, all of which were answered to their stated satisfaction. On the day of discharge, Burt was overall well-appearing, hemodynamically stable, and felt safe and comfortable with the plans for discharge to home with follow-up as described.    Outpatient follow-up issues:  - None specific. Continue to monitor for s/sx of CRS, ICANS, or MSK toxicity, among others. Patient was provided with  as-needed meds (ibuprofen, methocarbamol, Pepcid, ciprofloxacin, Flagyl) and given instructions to contact his team with any concerns.    New discharge medications:  - Ibuprofen  - Methocarbamol  - Pepcid  - Ciprofloxacin/Flagyl (in case of fevers/recurrent perianal fistula infection)    Next follow-up:  Future Appointments   Date Time Provider Department Center   6/24/2025  8:30 AM CRU ROOM 1 [facility]   7/1/2025 10:00 AM CRU ROOM 4 [facility]      Hospital Course   Wenceslao Finch was admitted on 6/16/2025.  The following problems were addressed during his hospitalization:    Wenceslao Finch is a 70 year old male with a past medical history significant for prostate cancer (diagnosed 2016) previously s/p prostatectomy, salvage radiotherapy, chemotherapy, and Pluvicto, with recent evidence of rising PSA as well as progression on PSMA PET, who was admitted for planned C1D1 of GGC046 per the Phase 3 clinical trial.     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 Boss on 5/19/25, for further details. In brief, he was diagnosed in 11/2016. Initial [place] score 4+3=7, s/p prostatectomy, staged at pT2. PSA became detectable again and he underwent salvage radiation with Dr. Keen ending 12/2018, along with starting 2 years of ADT. PSA became detectable again in 07/2020 without any evidence of metastatic disease on PET. He continued progressing on multiple therapies and developed bone mets. He was initiated on Docetaxel/Darolutamide/Prednisone, C1D1=1/17/24, which was complicated by a couple of hospitalizations for neutropenic fever. His last cycle was given on 9/11/24. Unfortunately, PSMA PET in 09/2024 showed disease progression, following which he was started on Pluvicto. He received 4 cycles of Pluvicto, last on 4/1/25. His PSA continued to rise, and  "another PSMA PET on 4/8/25 showed numerous new PSMA avid osseous lesions, including bony lesions of the left proximal humerus, left iliac wing, right acetabular roof, and right sacrum. He was continued on Eligard, last given 4/30/25, and Darolutamide 600 mg BID. Given his continued progression, he was referred to [facility] and enrolled on the WEV549 Phase 3 trial. He was admitted on 6/16/25 for planned C1D1 of CGE570.  - Continue APAP 1000 mg Q6H scheduled x 24 hours post-discharge  - Anti-androgen therapy and prednisone discontinued prior to admission     Treatment Plan: AMG-509 biochemical relapse study, C1D1=6/16/25                            - AMG-509 (Xaluritamig) 0.1 mg IV over 60 minutes -- D1  - Dexamethasone 8 mg PO -- morning prior to admission, 1 hour prior to infusion, and 18 hours after infusion prior to discharge  - Acetaminophen 1000 mg Q6H PO -- starting 1 hour prior and continue 24 hours s/p discharge     # Risk for CRS  - None noted throughout admission  - Graded and managed per table below  - Patient educated on continued need to monitor for fevers, hypotension, and hypoxia at home and to contact his team with any concerns  - Please note: for the treatment of CRS requiring tocilizumab, the provider must release the signed & held tocilizumab order (IDS 6367), located under order set \"Research Plan CRS Management of Xaluritamig.\" No biosimilar products (i.e. Tyenee) are allowed per the research protocol. There is a study-specific supply of tocilizumab available for this purpose.       CRS Guidelines for Xaluritamig :  CRS  Grade Defining  Symptoms Management   Grade 1 Fever (Temperature >=38 C) without hypotension and hypoxia Consider the risk for infection before  treating CRS. This is a clinical  assessment. If suspicion is high but  CRS is still considered, treat both  infection with appropriate antibiotics and  CRS per the schedule below:  ? Administer acetaminophen/ paracetamol 1000 mg (if more than " 6 hours since last dose) for 24 to 48 hours.  ?  Consider using tocilizumabb 8 mg/kg (max 800 mg) (or equivalent) for  1 dose for fever (temperature  >= 38C) persisting > 48 hours OR  fever (temperature >= 38C) with an  onset within 2 hours from EOI.  ?  For subject with high cardiac-risk or  extensive co-morbidities at baseline  or prolonged CRS lasting > 48 hours  or onset within first 2 hours from  EOI, manage as per grade 2 CRS  guidance below.      Grade 2 Fever (Temperature >=38 C) with:     ?  Hypotension not requiring vasopressor     AND/OR     ?  Hypoxia requiring low-flow </=6 L/min nasal cannula or blow-by. Consider the risk for infection before  treating CRS. This is a clinical  assessment. If suspicion is high but  CRS is still considered, treat both  infection with appropriate antibiotics and CRS per the schedule below:     Step 1:  ? Administer  acetaminophen/paracetamol  1000 mg (if more than 6 hours  since last dose) for 24 to  48 hours.  ? And administer dexamethasone 8 mg.      If symptoms resolved within 4 hours:  Continue:  ? Acetaminophen/paracetamol  1000 mg every 6 hours for  48 hours.  ? Dexamethasone 8 mg BID for  48 hours.     If symptoms NOT resolved within 4 hours:  Step 2:  ? Tocilizumabb 8 mg/kg (max  800 mg) (or equivalent).  Repeat after 8 hours, as  needed, based on clinical  assessment.  ?  And maintain dexamethasone  8 mg BID for 48 hours.       Grade 3 Fever (temperature >= 38C) with:  ? Hypotension requiring vasopressors with or without  Vasopressin     AND/OR     ? Hypoxia requiring high-flow  (> 6 L/min) nasal cannula, facemask, nonrebreather mask, or  Venturi mask. In addition to Grade 2 treatment:  ? Recommend intensive monitoring, e.g., ICU care.  ? Administer dexamethasone (Or equivalent) 8 mg IV every 8 hours up to 3 doses.  ? Vasopressor support as needed.  ? High flow oxygen support as needed.  ? Recommend tocilizumab     Grade 4 Fever (temperature >= 38 C) with:  ?  Hypotension requiring multiple  vasopressors (excluding  vasopressin)     AND/OR     ? Hypoxia requiring positive pressure (eg, CPAP, BiPAP, intubation and mechanical  ventilation). Admit to intensive care unit for  close clinical and vital sign  monitoring per institutional  guidelines.  ? Administer Tocilizumab 8 mg/kg (max 800 mg). Repeat after  8 hours, as needed, based on  clinical assessment.  ? Administer dexamethasone  (Or equivalent) 8 mg IV every  8 hours for 3 doses not to  exceed 24 mg in 1 day. Further  corticosteroid use should be  discussed with the [employer]  medical monitor.         # Risk for ICANS and neurotoxicity  Monitor with neuro checks and ICANS assessments every shift. If ICANS score is </= 9, then increase frequency of neuro checks to every 4 hours until returned to baseline. Management as below:  Grading and management of ICANS:  ICE score 7-9 or depressed level of consciousness awaking spontaneously: monitor, consider neurology consultation  ICE score 3-6 or depressed level of consciousness awaking to voice:   Dexamethasone 8 to 10 mg IV every 12 hours or methylprednisolone equivalent. Once ICANS improves to grade 1 or less, taper and or stop corticosteroids depending on clinical situation.  Currently on Keppra 500 mg BID, consider neurology consultation.  ICE score 0-2, depressed level of consciousness awaking to tactile stimuli, or any seizure:   Administer dexamethasone as above.  Currently on Keppra 500 mg BID, consider neurology consultation.  ICE score 0, depressed level of consciousness (stupor/coma), life-threatening seizures, motor findings, cerebral edema, or other severe neurological s/sx:  Administer dexamethasone as above OR give methylprednisolone 1000 mg daily x3 days; if improves, then manage as above.  Currently on Keppra 500 mg BID, consider neurology consultation.     # Risk for musculoskeletal inflammation  Typically noted days following administration of Xaluritamig. May  localize, but is not limited to, areas of past injury. MSK inflammation events including but not limited to myalgia, myositis, myofascitis, muscle weakness, arthralgia, and soft tissue swelling including orbital oedema and genital/scrotal oedema.  - None noted throughout admission; patient educated on s/sx to monitor at home. Rx for ibuprofen, methocarbamol, and famotidine ordered.  - Note: Any MSK inflammatory event as symptom of CRS (ie, onset within 48 hours of onset of CRS) should be managed per CRS guidelines as above.     Grade Management   Grade 1 Initiate/continue paracetamol/acetaminophen 1000 mg 6-hourly and ibuprofen 400 mg 8-hourly with gastric protection (eg, omeprazole 20 mg 12-hourly) or other pain medication with or without corticosteroids, for 24 to 48 hours, as per local standard of care.  ? If no relief in 24 to 48 hours, consider starting corticosteroids.  Recommend dexamethasone 8 mg QD (or prednisone 0.5 mg/kg QD).   Grade 2 Initiate/continue paracetamol/acetaminophen 1000 mg 6-hourly and ibuprofen 400 mg 8-hourly with gastric protection (eg, omeprazole 20 mg  12-hourly) or other pain medication with or without corticosteroids, for 24 hours, as per local standard of care.  ? If no relief in 24 hours, consider adding corticosteroids as soon as possible. Recommend dexamethasone 8 mg QD or prednisone 0.5 to 1 mg/kg QD or equivalent corticosteroid dose. Continue corticosteroids at  this dose until resolution to grade 1 or less, for maximal 72 hours. Taper corticosteroids gradually over 1 to 2 weeks as per local standard of care.  ? If the MSK inflammation event is associated with CRS or CRP is elevated and treatment with corticosteroids do not result in resolution to grade 1 or less within approximately 24 to 48 hours, consider administration of tocilizumab at a dose of 8 mg/kg (max 800 mg) (or equivalent). Repeat after 8 hours, as needed, based on clinical  assessment.  ? Administer additional  pain medications at the discretion of the investigators according to the local standard of care.  ? Consider performing CT/MRI and/or electromyography of the affected muscle and performing a muscle biopsy of abnormal muscular tissue on MRI if accessible and safe to perform such a procedure.   Grade 3 Initiate/continue paracetamol/acetaminophen 1000 mg 6-hourly and ibuprofen 400 mg 8-hourly with gastric protection (eg, omeprazole 20 mg 12-hourly) or other pain medication, as per local standard of care.  ? Initiate corticosteroids: Dexamethasone 8 mg BID or prednisone 1 to 2 mg/kg QD. Continue corticosteroids at this dose until resolution to grade  1 or less, for maximal 72 hours. Taper corticosteroids gradually over 1 to 2 weeks as per local standard of care.  ? If the MSK inflammation event is associated with CRS or CRP is elevated and treatment with corticosteroids do not result in resolution to grade 1 or less within approximately 24 to 48 hours, consider administration of tocilizumab at a dose of 8 mg/kg (max 800 mg) (or equivalent). Repeat after 8 hours, as needed, based on clinical assessment.  ? Administer additional pain medications at the discretion of the investigators according to the local standard of care.  ? Consider performing CT/MRI and/or electromyography of the affected muscle of the affected muscle and performing a muscle biopsy of abnormal muscular tissue on MRI if accessible and safe to perform such a procedure.      CHRONIC/MISC  # Hyponatremia  Mild, Na 134, noted on admission. Decreased to 132 on 6/17. Note 1L of NS given as part of treatment protocol. DDx includes hypovolemia versus SIADH (given worsening with IV fluids).  - Trend BMP  - Consider further work-up (urine/serum osms, urine Na) if persistent/worsening further     # Depression - continue PTA venlafaxine     # History of recurrent perirectal abscesses  # Perianal fistula  Previously requiring I&D by CRS at OSH (most recently in  09/2024). Note made of an apparent perianal fistula seen on most recent PSMA PET (4/9/25). Patient asymptomatic on admission.  - Monitor clinically  - Prescriptions for ciprofloxacin/Flagyl ordered on discharge      # Chronic low back pain - seeing PT weekly, not on any pain medications PTA. Will represent possible site of potential future MSK toxicity due to DSL228; monitor closely.     # Chronic bilateral knee pain - reports history of previous menisectomy, pain controlled with activity modifications. Will represent possible sites of potential future MSK toxicity; monitor closely.      # Bilateral pulmonary nodules - noted on previous imaging, most recently OSH chest CT (07/26/24). Non-FDG avid, presumed benign.     # Coronary artery disease - radiographic evidence of CAD noted on most recent PSMA PET (04/2025). No previous history of PCI or stents.     Staffed with Dr. Galindo.    60 MINUTES SPENT BY ME on the date of service doing chart review, history, exam, documentation & further activities per the note.      Tiffanie Luna PA-C  Hematology/Oncology  Pager: #[phone]    Code Status   Full Code    Primary Care Physician   Harshal Nicholas    Physical Exam   Vital Signs with Ranges  Temp:  [98.1  F (36.7  C)-99.9  F (37.7  C)] 98.5  F (36.9  C)  Pulse:  [89-99] 91  Resp:  [16-20] 18  BP: (120-166)/(71-97) 120/71  SpO2:  [97 %-100 %] 97 %  273 lbs 11.2 oz    Constitutional: Pleasant and cooperative male. Awake, alert, NAD. Seated in bed, smiling and conversational.  HEENT: NC/AT, EOMI, sclera clear, conjunctiva normal, OP with MMM  Respiratory: No increased work of breathing, CTAB, no crackles or wheezing.  Cardiovascular: RRR, no murmur noted. Trace peripheral edema.  GI: Normal bowel sounds, soft, non-distended and non-tender.  MSK: No large joint effusions or gross deformities. No tenderness to palpation over the bones of the L-spine.  Skin: Warm, dry, well-perfused. Scattered ecchymoses to the bilateral upper  extremities. No obvious rashes or concerning skin lesions.  Neurologic: A&O. Answers questions appropriately. Moves all extremities spontaneously.  Psych: Calm, appropriate affect  Vascular access:  Port on right chest wall CDI, non-tender, no surrounding erythema.    Discharge Disposition   Discharged to home  Condition at discharge: Stable    Consultations This Hospital Stay   NURSING TO CONSULT FOR VASCULAR ACCESS CARE IP CONSULT    Discharge Orders      Reason for your hospital stay    You were admitted for planned dose #1 of POY829 (Xaluritamig) per the clinical trials protocol.     Activity    Your activity upon discharge: activity as tolerated     Tubes and Drains    Current Tubes and Drains:     CVC Line  Duration           Port a Cath Single Lumen Right Chest wall -- days              ADULT [facility] Specialty Follow-up and recommended labs and tests    An appointment for hospital follow up was requested for you. If it is not scheduled by the time you discharge you will be contacted with the date and time. You may call clinic to makes changes to this appointment if needed.    Already scheduled appointments are listed below.       Appointments on [place] and/or [facility] (with [facility] or [facility] provider or service). Call [phone] if you haven't heard regarding these appointments within 7 days of discharge.     When to contact your care team    Please call the [facility] at [phone] if you develop temperature above 100.4, shortness of breath, chest pain, headaches, vision changes, bleeding, uncontrolled nausea, vomiting, diarrhea, pain, or any other signs or symptoms of concern. If you are concerned that your symptoms are life-threatening, don't hesitate to call 911 or go to the nearest Emergency Room.     Diet    Follow this diet upon discharge: Regular     Discharge Medications   Current Discharge Medication List        START taking these medications     Details   ciprofloxacin (CIPRO) 500 MG tablet Take 1 tablet (500 mg) by mouth 2 times daily as needed (Take only if instructed to do so by Dr. Boss or the [facility] team for fevers.).  Qty: 14 tablet, Refills: 0    Associated Diagnoses: Hormone resistant prostate cancer (H)      famotidine (PEPCID) 20 MG tablet Take 1 tablet (20 mg) by mouth 2 times daily as needed (Take when taking ibuprofen).  Qty: 60 tablet, Refills: 5    Associated Diagnoses: Hormone resistant prostate cancer (H)      methocarbamol (ROBAXIN) 750 MG tablet Take 1 tablet (750 mg) by mouth 3 times daily as needed for muscle spasms.  Qty: 90 tablet, Refills: 5    Associated Diagnoses: Hormone resistant prostate cancer (H)      metroNIDAZOLE (FLAGYL) 500 MG tablet Take 1 tablet (500 mg) by mouth 3 times daily as needed (Take only if instructed to do so by Dr. Boss or the [facility] team for fevers.).  Qty: 21 tablet, Refills: 0    Associated Diagnoses: Hormone resistant prostate cancer (H)           CONTINUE these medications which have CHANGED    Details   ibuprofen (ADVIL/MOTRIN) 200 MG tablet Take 3 tablets (600 mg) by mouth every 6 hours as needed for pain (muscle aches).  Qty: 240 tablet, Refills: 1    Associated Diagnoses: Hormone resistant prostate cancer (H)           CONTINUE these medications which have NOT CHANGED    Details   dexAMETHasone (DECADRON) 4 MG tablet Take 2 tablets (8 mg) by mouth See Admin Instructions. Take 2 tablets (8 mg) the evening (approximately 12-16 hours) prior to the first 4 xaluritamig doses. Record time taken.  Qty: 30 tablet, Refills: 5    Associated Diagnoses: Hormone resistant prostate cancer (H)      venlafaxine (EFFEXOR XR) 150 MG 24 hr capsule Take 150 mg by mouth every evening.      acetaminophen (TYLENOL) 500 MG tablet Take 2 tablets (1,000 mg) by mouth every 6 hours. Take for the first 24 hours after discharge for all xaluritamig doses in Cycle 1. Maximum 4000 mg in 24 hours.  Qty: 120 tablet, Refills: 5     Associated Diagnoses: Hormone resistant prostate cancer (H)      calcium carbonate 500 mg, elemental, (OSCAL 500) 1250 (500 Ca) MG TABS tablet Take 500 mg by mouth daily.      loratadine (CLARITIN) 10 MG tablet Take 10 mg by mouth daily.      vitamin D3 (CHOLECALCIFEROL) 50 mcg (2000 units) tablet Take 1 tablet by mouth 2 times daily.      vitamin E (TOCOPHEROL) 400 units (180 mg) capsule Take 400 Units by mouth daily.           Allergies   No Known Allergies    Data   Most Recent 3 CBC's:  Recent Labs   Lab Test 25  1055 25  1157   WBC 14.3* 12.4* 10.0   HGB 9.8* 11.8* 12.0*   MCV 93 93 94    252 238      Most Recent 3 BMP's:  Recent Labs   Lab Test 257 25  1055 25  1157   * 134* 136   POTASSIUM 4.0 4.1 4.1   CHLORIDE 101 100 101   CO2 20* 22 23   BUN 24.7* 22.9 19.3   CR 1.14 1.13 0.99   ANIONGAP 11 12 12   EARL 8.9 10.1 9.5   * 165* 91     Most Recent 2 LFT's:  Recent Labs   Lab Test 25  1055   AST 24 24   ALT 14 18   ALKPHOS 40 48   BILITOTAL <0.2 <0.2     Most Recent INR's and Anticoagulation Dosing History:  Anticoagulation Dose History          Latest Ref Rng & Units 2025   Recent Dosing and Labs   INR 0.85 - 1.15 0.96      Most Recent 3 Troponin's:No lab results found.  Most Recent Cholesterol Panel:No lab results found.  Most Recent 6 Bacteria Isolates From Any Culture (See EPIC Reports for Culture Details):No lab results found.  Most Recent TSH, T4 and A1c Labs:No lab results found.  Results for orders placed or performed during the hospital encounter of 25   Echocardiogram Complete     Value    LVEF  50-55% (borderline)    Narrative    [id number]  [id number]  [id number]  [id number]^PAMELLA^ANA^[facility]  Echocardiography Laboratory  [address] [place] [id number]     Name: NOBLE RODRIGUEZ  MRN: [id number]  : 1955  Study Date: 2025  09:06 AM  Age: 70 yrs  Gender: Male  Patient Location: [facility]  Reason For Study: Metastatic castration-resistant adenocarcinoma of prostate  (H)  Ordering Physician: ANA CAN  Referring Physician: ANA CAN  Performed By: Rashmi Escamilla     BSA: 2.4 m2  Height: 71 in  Weight: 265 lb  HR: 75  BP: 170/94 mmHg  ______________________________________________________________________________  Procedure  Echocardiogram with two-dimensional, color and spectral Doppler. Contrast  Optison. Optison (NDC #0840-5023-82) given intravenously. Patient was given 6  ml mixture of 3 ml Optison and 6 ml saline. 3 ml wasted.  ______________________________________________________________________________  Interpretation Summary  Left ventricular function is decreased. The ejection fraction is 50-55%  (borderline).  Right ventricular function, chamber size, wall motion, and thickness are  normal.  Mild (pulmonary artery systolic pressure<50mmHg) pulmonary hypertension is  present.  Sinuses of Valsalva 4.3 cm.  Ascending aorta 4.5 cm.  Aortic root and ascending dilatation is present.  No pericardial effusion is present.  There is no prior study for direct comparison.  ______________________________________________________________________________  Left Ventricle  Left ventricular size is normal. Mild concentric wall thickening consistent  with left ventricular hypertrophy is present. Left ventricular function is  decreased. The ejection fraction is 50-55% (borderline). Grade I or early  diastolic dysfunction. No regional wall motion abnormalities are seen.     Right Ventricle  Right ventricular function, chamber size, wall motion, and thickness are  normal.     Atria  Both atria appear normal.     Mitral Valve  The mitral valve is normal. Trace to mild mitral insufficiency is present.     Aortic Valve  The valve leaflets are not well visualized. Trace aortic insufficiency is  present.     Tricuspid  Valve  The tricuspid valve is normal. Trace to mild tricuspid insufficiency is  present. The right ventricular systolic pressure is approximated at 37.7 mmHg  plus the right atrial pressure. Mild (pulmonary artery systolic  pressure<50mmHg) pulmonary hypertension is present.     Pulmonic Valve  The valve leaflets are not well visualized. On Doppler interrogation, there is  no significant stenosis or regurgitation.     Vessels  Sinuses of Valsalva 4.3 cm. Ascending aorta 4.5 cm. Aortic root and ascending  dilatation is present.     Pericardium  No pericardial effusion is present.     Compared to Previous Study  There is no prior study for direct comparison.  ______________________________________________________________________________  MMode/2D Measurements & Calculations  IVSd: 1.3 cm     LVIDd: 5.7 cm  LVIDs: 4.5 cm  LVPWd: 1.2 cm  FS: 21.8 %  LV mass(C)d: 306.3 grams  LV mass(C)dI: 128.9 grams/m2  Ao root diam: 4.3 cm  asc Aorta Diam: 4.5 cm  LVOT diam: 2.7 cm  LVOT area: 5.7 cm2  Ao root diam index Ht(cm/m): 2.4  Ao root diam index BSA (cm/m2): 1.8  Asc Ao diam index BSA (cm/m2): 1.9  Asc Ao diam index Ht(cm/m): 2.5  EF Biplane: 53.3 %  LA Volume (BP): 76.3 ml     LA Volume Index (BP): 32.1 ml/m2  RWT: 0.43  TAPSE: 2.8 cm     Doppler Measurements & Calculations  MV E max francis: 45.2 cm/sec  MV A max francis: 73.2 cm/sec  MV E/A: 0.62  MV dec slope: 156.0 cm/sec2  MV dec time: 0.29 sec  PA acc time: 0.13 sec  TR max francis: 307.1 cm/sec  TR max P.7 mmHg  E/E' av.6  Lateral E/e': 10.2  Medial E/e': 9.0  RV S Francis: 16.8 cm/sec     ______________________________________________________________________________  Report approved by: ELEAZAR Betancourt MD on 2025 11:50 AM

## 2025-06-23 ENCOUNTER — TELEPHONE (OUTPATIENT)
Dept: ONCOLOGY | Facility: CLINIC | Age: 70
End: 2025-06-23
Payer: COMMERCIAL

## 2025-06-23 NOTE — TELEPHONE ENCOUNTER
Called Burt to remind to take 8mg (2x4mg) of dexamethasone this evening.      Notes he feels well and has had no problems since hospital discharge.

## 2025-06-24 ENCOUNTER — RESULTS FOLLOW-UP (OUTPATIENT)
Dept: ONCOLOGY | Facility: CLINIC | Age: 70
End: 2025-06-24

## 2025-06-24 ENCOUNTER — HOSPITAL ENCOUNTER (OUTPATIENT)
Dept: RESEARCH | Facility: CLINIC | Age: 70
Discharge: HOME | End: 2025-06-24
Attending: STUDENT IN AN ORGANIZED HEALTH CARE EDUCATION/TRAINING PROGRAM | Admitting: STUDENT IN AN ORGANIZED HEALTH CARE EDUCATION/TRAINING PROGRAM
Payer: COMMERCIAL

## 2025-06-24 ENCOUNTER — ALLIED HEALTH/NURSE VISIT (OUTPATIENT)
Dept: ONCOLOGY | Facility: CLINIC | Age: 70
End: 2025-06-24

## 2025-06-24 VITALS
DIASTOLIC BLOOD PRESSURE: 89 MMHG | RESPIRATION RATE: 17 BRPM | WEIGHT: 268.3 LBS | OXYGEN SATURATION: 98 % | BODY MASS INDEX: 36.94 KG/M2 | HEART RATE: 91 BPM | SYSTOLIC BLOOD PRESSURE: 157 MMHG | TEMPERATURE: 97.2 F

## 2025-06-24 DIAGNOSIS — C61 HORMONE RESISTANT PROSTATE CANCER (H): Primary | ICD-10-CM

## 2025-06-24 DIAGNOSIS — Z19.2 HORMONE RESISTANT PROSTATE CANCER (H): Primary | ICD-10-CM

## 2025-06-24 DIAGNOSIS — Z00.6 EXAMINATION OF PARTICIPANT OR CONTROL IN CLINICAL RESEARCH: ICD-10-CM

## 2025-06-24 DIAGNOSIS — C61 PROSTATE CANCER METASTATIC TO BONE (H): Primary | ICD-10-CM

## 2025-06-24 DIAGNOSIS — C79.51 PROSTATE CANCER METASTATIC TO BONE (H): Primary | ICD-10-CM

## 2025-06-24 LAB
ALBUMIN SERPL BCG-MCNC: 3.8 G/DL (ref 3.5–5.2)
ALP SERPL-CCNC: 54 U/L (ref 40–150)
ALT SERPL W P-5'-P-CCNC: 15 U/L (ref 0–70)
ANION GAP SERPL CALCULATED.3IONS-SCNC: 11 MMOL/L (ref 7–15)
AST SERPL W P-5'-P-CCNC: 19 U/L (ref 0–45)
BASOPHILS # BLD AUTO: 0 10E3/UL (ref 0–0.2)
BASOPHILS NFR BLD AUTO: 0 %
BILIRUB SERPL-MCNC: 0.2 MG/DL
BILIRUBIN DIRECT (ROCHE PRO & PURE): 0.09 MG/DL (ref 0–0.45)
BUN SERPL-MCNC: 20.4 MG/DL (ref 8–23)
CALCIUM SERPL-MCNC: 10.1 MG/DL (ref 8.8–10.4)
CHLORIDE SERPL-SCNC: 102 MMOL/L (ref 98–107)
CREAT SERPL-MCNC: 1.02 MG/DL (ref 0.67–1.17)
CRP SERPL-MCNC: 13.1 MG/L
EGFRCR SERPLBLD CKD-EPI 2021: 80 ML/MIN/1.73M2
EOSINOPHIL # BLD AUTO: 0 10E3/UL (ref 0–0.7)
EOSINOPHIL NFR BLD AUTO: 0 %
ERYTHROCYTE [DISTWIDTH] IN BLOOD BY AUTOMATED COUNT: 13.7 % (ref 10–15)
FERRITIN SERPL-MCNC: 355 NG/ML (ref 31–409)
GLUCOSE SERPL-MCNC: 143 MG/DL (ref 70–99)
HCO3 SERPL-SCNC: 21 MMOL/L (ref 22–29)
HCT VFR BLD AUTO: 33.3 % (ref 40–53)
HGB BLD-MCNC: 11.2 G/DL (ref 13.3–17.7)
IMM GRANULOCYTES # BLD: 0.2 10E3/UL
IMM GRANULOCYTES NFR BLD: 2 %
LYMPHOCYTES # BLD AUTO: 0.5 10E3/UL (ref 0.8–5.3)
LYMPHOCYTES NFR BLD AUTO: 5 %
MCH RBC QN AUTO: 30.9 PG (ref 26.5–33)
MCHC RBC AUTO-ENTMCNC: 33.6 G/DL (ref 31.5–36.5)
MCV RBC AUTO: 92 FL (ref 78–100)
MONOCYTES # BLD AUTO: 0.3 10E3/UL (ref 0–1.3)
MONOCYTES NFR BLD AUTO: 2 %
NEUTROPHILS # BLD AUTO: 9.7 10E3/UL (ref 1.6–8.3)
NEUTROPHILS NFR BLD AUTO: 91 %
NRBC # BLD AUTO: 0 10E3/UL
NRBC BLD AUTO-RTO: 0 /100
PLATELET # BLD AUTO: 287 10E3/UL (ref 150–450)
POTASSIUM SERPL-SCNC: 4.7 MMOL/L (ref 3.4–5.3)
PROT SERPL-MCNC: 7 G/DL (ref 6.4–8.3)
RBC # BLD AUTO: 3.62 10E6/UL (ref 4.4–5.9)
SODIUM SERPL-SCNC: 134 MMOL/L (ref 135–145)
URATE SERPL-MCNC: 5.1 MG/DL (ref 3.4–7)
WBC # BLD AUTO: 10.7 10E3/UL (ref 4–11)

## 2025-06-24 PROCEDURE — 82310 ASSAY OF CALCIUM: CPT | Performed by: STUDENT IN AN ORGANIZED HEALTH CARE EDUCATION/TRAINING PROGRAM

## 2025-06-24 PROCEDURE — 250N000012 HC RX MED GY IP 250 OP 636 PS 637: Performed by: STUDENT IN AN ORGANIZED HEALTH CARE EDUCATION/TRAINING PROGRAM

## 2025-06-24 PROCEDURE — 36415 COLL VENOUS BLD VENIPUNCTURE: CPT | Performed by: STUDENT IN AN ORGANIZED HEALTH CARE EDUCATION/TRAINING PROGRAM

## 2025-06-24 PROCEDURE — 258N000003 HC RX IP 258 OP 636: Performed by: STUDENT IN AN ORGANIZED HEALTH CARE EDUCATION/TRAINING PROGRAM

## 2025-06-24 PROCEDURE — 300N000025 HC RESEARCH LAB DELIVERY SURCHARGE, PER DELIVERY

## 2025-06-24 PROCEDURE — 300N000007 HC RESEARCH B&I SPECIMEN PROCESSING, SIMPLE

## 2025-06-24 PROCEDURE — 84550 ASSAY OF BLOOD/URIC ACID: CPT | Performed by: STUDENT IN AN ORGANIZED HEALTH CARE EDUCATION/TRAINING PROGRAM

## 2025-06-24 PROCEDURE — 82248 BILIRUBIN DIRECT: CPT | Performed by: STUDENT IN AN ORGANIZED HEALTH CARE EDUCATION/TRAINING PROGRAM

## 2025-06-24 PROCEDURE — 85004 AUTOMATED DIFF WBC COUNT: CPT | Performed by: STUDENT IN AN ORGANIZED HEALTH CARE EDUCATION/TRAINING PROGRAM

## 2025-06-24 PROCEDURE — 510N000009 HC RESEARCH FACILITY, PER 15 MIN

## 2025-06-24 PROCEDURE — 86140 C-REACTIVE PROTEIN: CPT | Performed by: STUDENT IN AN ORGANIZED HEALTH CARE EDUCATION/TRAINING PROGRAM

## 2025-06-24 PROCEDURE — 82728 ASSAY OF FERRITIN: CPT | Performed by: STUDENT IN AN ORGANIZED HEALTH CARE EDUCATION/TRAINING PROGRAM

## 2025-06-24 PROCEDURE — 510N000029 HC RESEARCH B&I MEALS, PER MEAL

## 2025-06-24 PROCEDURE — 250N000013 HC RX MED GY IP 250 OP 250 PS 637: Performed by: STUDENT IN AN ORGANIZED HEALTH CARE EDUCATION/TRAINING PROGRAM

## 2025-06-24 PROCEDURE — 250N000011 HC RX IP 250 OP 636: Performed by: STUDENT IN AN ORGANIZED HEALTH CARE EDUCATION/TRAINING PROGRAM

## 2025-06-24 PROCEDURE — 510N000023 HC CRU B&I PATIENT CARE, PER 15 MIN

## 2025-06-24 PROCEDURE — 96365 THER/PROPH/DIAG IV INF INIT: CPT

## 2025-06-24 RX ORDER — HEPARIN SODIUM,PORCINE 10 UNIT/ML
5-10 VIAL (ML) INTRAVENOUS EVERY 24 HOURS
Status: DISCONTINUED | OUTPATIENT
Start: 2025-06-24 | End: 2025-06-25 | Stop reason: HOSPADM

## 2025-06-24 RX ORDER — DIPHENHYDRAMINE HYDROCHLORIDE 50 MG/ML
50 INJECTION, SOLUTION INTRAMUSCULAR; INTRAVENOUS
Status: DISCONTINUED | OUTPATIENT
Start: 2025-06-24 | End: 2025-06-25 | Stop reason: HOSPADM

## 2025-06-24 RX ORDER — DIPHENHYDRAMINE HYDROCHLORIDE 50 MG/ML
25 INJECTION, SOLUTION INTRAMUSCULAR; INTRAVENOUS
Status: DISCONTINUED | OUTPATIENT
Start: 2025-06-24 | End: 2025-06-25 | Stop reason: HOSPADM

## 2025-06-24 RX ORDER — FAMOTIDINE 20 MG/1
20 TABLET, FILM COATED ORAL EVERY 12 HOURS PRN
Status: DISCONTINUED | OUTPATIENT
Start: 2025-06-24 | End: 2025-06-25 | Stop reason: HOSPADM

## 2025-06-24 RX ORDER — ACETAMINOPHEN 500 MG
1000 TABLET ORAL EVERY 6 HOURS PRN
Status: DISCONTINUED | OUTPATIENT
Start: 2025-06-24 | End: 2025-06-25 | Stop reason: HOSPADM

## 2025-06-24 RX ORDER — IBUPROFEN 200 MG
400 TABLET ORAL EVERY 8 HOURS PRN
Status: DISCONTINUED | OUTPATIENT
Start: 2025-06-24 | End: 2025-06-25 | Stop reason: HOSPADM

## 2025-06-24 RX ORDER — HEPARIN SODIUM,PORCINE 10 UNIT/ML
5-10 VIAL (ML) INTRAVENOUS
Status: DISCONTINUED | OUTPATIENT
Start: 2025-06-24 | End: 2025-06-25 | Stop reason: HOSPADM

## 2025-06-24 RX ORDER — DEXAMETHASONE 4 MG/1
8 TABLET ORAL ONCE
Status: COMPLETED | OUTPATIENT
Start: 2025-06-24 | End: 2025-06-24

## 2025-06-24 RX ORDER — ACETAMINOPHEN 500 MG
1000 TABLET ORAL EVERY 6 HOURS
Status: DISCONTINUED | OUTPATIENT
Start: 2025-06-24 | End: 2025-06-25 | Stop reason: HOSPADM

## 2025-06-24 RX ORDER — DEXAMETHASONE SODIUM PHOSPHATE 4 MG/ML
8 INJECTION, SOLUTION INTRA-ARTICULAR; INTRALESIONAL; INTRAMUSCULAR; INTRAVENOUS; SOFT TISSUE
Status: DISCONTINUED | OUTPATIENT
Start: 2025-06-24 | End: 2025-06-25 | Stop reason: HOSPADM

## 2025-06-24 RX ORDER — HEPARIN SODIUM (PORCINE) LOCK FLUSH IV SOLN 100 UNIT/ML 100 UNIT/ML
SOLUTION INTRAVENOUS
Status: COMPLETED
Start: 2025-06-24 | End: 2025-06-24

## 2025-06-24 RX ORDER — MEPERIDINE HYDROCHLORIDE 25 MG/ML
25 INJECTION INTRAMUSCULAR; INTRAVENOUS; SUBCUTANEOUS
Status: DISCONTINUED | OUTPATIENT
Start: 2025-06-24 | End: 2025-06-25 | Stop reason: HOSPADM

## 2025-06-24 RX ORDER — HEPARIN SODIUM (PORCINE) LOCK FLUSH IV SOLN 100 UNIT/ML 100 UNIT/ML
5-10 SOLUTION INTRAVENOUS
Status: DISCONTINUED | OUTPATIENT
Start: 2025-06-24 | End: 2025-06-25 | Stop reason: HOSPADM

## 2025-06-24 RX ORDER — EPINEPHRINE 1 MG/ML
0.3 INJECTION, SOLUTION, CONCENTRATE INTRAVENOUS EVERY 5 MIN PRN
Status: DISCONTINUED | OUTPATIENT
Start: 2025-06-24 | End: 2025-06-25 | Stop reason: HOSPADM

## 2025-06-24 RX ORDER — ALBUTEROL SULFATE 0.83 MG/ML
2.5 SOLUTION RESPIRATORY (INHALATION)
Status: DISCONTINUED | OUTPATIENT
Start: 2025-06-24 | End: 2025-06-25 | Stop reason: HOSPADM

## 2025-06-24 RX ORDER — METHYLPREDNISOLONE SODIUM SUCCINATE 40 MG/ML
40 INJECTION INTRAMUSCULAR; INTRAVENOUS
Status: DISCONTINUED | OUTPATIENT
Start: 2025-06-24 | End: 2025-06-25 | Stop reason: HOSPADM

## 2025-06-24 RX ORDER — ALBUTEROL SULFATE 90 UG/1
1-2 INHALANT RESPIRATORY (INHALATION)
Status: DISCONTINUED | OUTPATIENT
Start: 2025-06-24 | End: 2025-06-25 | Stop reason: HOSPADM

## 2025-06-24 RX ADMIN — HEPARIN SODIUM (PORCINE) LOCK FLUSH IV SOLN 100 UNIT/ML 5 ML: 100 SOLUTION at 16:22

## 2025-06-24 RX ADMIN — DEXAMETHASONE 8 MG: 4 TABLET ORAL at 16:26

## 2025-06-24 RX ADMIN — ACETAMINOPHEN 1000 MG: 500 TABLET ORAL at 15:58

## 2025-06-24 RX ADMIN — Medication 5 ML: at 16:22

## 2025-06-24 RX ADMIN — ACETAMINOPHEN 1000 MG: 500 TABLET ORAL at 10:07

## 2025-06-24 RX ADMIN — DEXAMETHASONE 8 MG: 4 TABLET ORAL at 10:07

## 2025-06-24 RX ADMIN — SODIUM CHLORIDE 1000 ML: 9 INJECTION, SOLUTION INTRAVENOUS at 10:07

## 2025-06-24 NOTE — PROGRESS NOTES
Clinical Research Unit Nursing Note:  Wenceslao Khanjarochorodriguez presents today for Cycle 1 Day 8, Study- Phase 3 AMG-509 for mCRPC (IDS #6454).     Patient seen by provider today: Yes: Dr Boss                             present during visit today: Not Applicable.     Note:   Cycle 1 Day 8. Pre-infusion labs drawn, okay to proceed.Tolerated previous infusion without incident.    Intravenous Access:  Port accessed and 1 PIV placed in RFA.    Pre-medications:  Dexamethasone PO 8 mg(home dose) taken @ 2000 per patient.  NS 1 L IV Started: @ 1010 over 4 hours  Acetaminophen PO 1000 mg @ 1010  Dexamethasone PO 8 mg @ 1010    Study - AMG-509 (IDS# 6454) intravenous infusion:   Chemo Double Check  Protocol verified?: Yes  Height and weight verified?: Yes  BSA confirmed?: N/A  Meets treatment parameters?: Yes  Initial RN verification: Karol Vieyra RN  Second RN verification: Beverley Interiano RN     Dose: 0.3 mg  Infusion started at 1125.   End of infusion (with 20 ml NS flush) ended at: 1231    Post Infusion Assessment:  Vitals and labs obtained per protocol. Observed for 6 hrs post infusion. Patient tolerated infusion without incident. Patient afebrile without signs of neurotoxicity.  Denies new symptoms. No new muscle pain, muscle weakness, joint pain, or soft tissue swelling.    Patient Vitals for the past 24 hrs:   BP Temp Temp src Pulse Resp SpO2 Weight   06/24/25 1636 (!) 157/89 -- -- -- -- -- --   06/24/25 1632 (!) 161/94 97.2  F (36.2  C) Temporal 91 17 98 % --   06/24/25 1416 (!) 160/92 97.9  F (36.6  C) Temporal 82 18 97 % --   06/24/25 1233 (!) 159/93 97  F (36.1  C) Temporal 93 18 97 % --   06/24/25 1118 (!) 144/89 97.3  F (36.3  C) Temporal 86 18 97 % --   06/24/25 0759 (!) 143/98 97.7  F (36.5  C) Temporal 93 17 97 % 121.7 kg (268 lb 4.8 oz)       CRS Grading ~2 Hours Post EOI 6 Hours Post EOI   Time of Assessment: 1418 1635   Fever: </= 100.4 </= 100.4   Blood pressure: SBP >/= 90 (not hypotensive) SBP  ">/= 90 (not hypotensive)   Oxygen saturation: >/= 90% on room air (not hypoxic) >/= 90% on room air (not hypoxic)   Current CRS stgstrstastdstest:st st1st 0     ICE Score ~2 Hours Post EOI 6 Hours Post EOI   Time of Assessment: 0503 [id number]   There are signs of headache, muscle spasms or rigidity, or vision changes. No. No.   Orientation: Orientation to year, month, city, hospital: 4 points (1 point each) 4 4   Identify: Name 3 objects that provider points to (e.g. clock, pen, button): 3 points (1 point each) 3 3   Following Commands: (e.g. show me two fingers or close your eyes and stick out your tongue): 1 point 1 1   Writing: Ability to write a standard sentence (see writing page \"Our national bird is the bald eagle.\"): 1 point 1 1   Attention: Count backwards from 100 by 10s: 1 point   1 1   Total: Max 10 10 10     Post Infusion Medications:  Acetaminophen PO 1000 mg @ 1600  Dexamethasone PO 8 mg @ 1631    Discharge Plan:   Ok to discharge. Port heparin locked and de-accessed discontinued per protocol.   AVS given to patient and patient instructed to remain within 1 hour hospital proximity for 24 hours, stay hydrated, and take 1000 mg tylenol every 6 hours for 24 hours.    Patient discharged in stable condition accompanied by: wife.  Departure Mode: Ambulatory.    Karol Vieyra RN on 6/24/2025 at 9:01 AM  [id number]  Yael Harrington RN on 6/24/2025 at 4:46 PM  [id number]      Table 1: CRS Grading per ASTCT (https://[email or web address]/10.1016/j.bbmt.[email or web address])       "

## 2025-06-24 NOTE — PATIENT INSTRUCTIONS
Burt,  Here is what to do on discharge today.   Please continue to take tylenol 1000mg every 6 hours for 24 hours AFTER discharge (4 doses in total).  2. Continue to aggressively hydrate with 11 cups of water or non-caffeinated fluids daily.  3. Watch your blood pressure and temperature.  4. Let us know right away if you have a fever of 100.4F or higher, blood pressure with top number less than 100, or  oxygen level less than 90%. During the day reach out to the study team. After hours and weekends, call me directly  at [phone] regardless of the time of day.  5. Tylenol and ibuprofen are OK if you have muscle aches.  6. For pain, I typically recommend 1000mg of tylenol every 8 (EIGHT) hours rather than every 6, which is different than  your post-discharge dosing regimen.  7. If you take ibuprofen, you can take 600mg every 6 hours. You need to take this with famotidine to protect the lining  of your stomach.  8. Let me know if you get worsening pain or symptoms around your fistula.  9. Let us know right away if you develop a rash.  10. Keep your home action kit with you if you leave home for more than 2-3 hours.  11. For routine updates (no fever, low blood pressure, or low oxygen) you can give us updates in [employer] if you can get  it to work.

## 2025-06-24 NOTE — PROGRESS NOTES
[id number] Ph3 for mCRPC: Study Visit Note   Subject name: Wenceslao Finch     Visit: Cycle 1 Day 8    Did the study visit occur within the appropriate window allowed by the protocol? yes    Since the last study visit, He has been doing well. Wenceslao denied any rashes, chills, muscle aches or pains. The patient did report an elevated temperature that was not quite a fever, he managed this with tylenol.  The patient had no other symptoms and no changes to any medications.     No local labs were required today per protocol. However, additional labs collected per investigators preference for patient safety monitoring.     ePROs questionairres completed at this visit via tablet.     I have personally interviewed Wenceslao Finch and reviewed his medical record for adverse events and concomitant medications and these have been recorded on the corresponding logs in Wenceslao Finch's research file.     Special considerations for today's visit were reviewed with staff administering the study intervention including: Instructed RN to take VS within 30 minutes of the start of the infusion and at the end of the infusion. RN was also instructed to take research kit within 30 minutes prior to the infusion and at the end of the infusion. Infuse IP over 60 mins +/- 10 mins. 20 mL flush per institutional standard not to exceed 30 mins.     Wenceslao Finch was given the opportunity to ask any trial related questions.  Please see provider progress note for physical exam and other clinical information. Labs were reviewed - any significant lab values were addressed and reviewed.    Yolis Pittman   Clinical Research Coordinator   [facility] [place]  [facility]/ [email or web address]  [phone]/ [phone]

## 2025-06-24 NOTE — PROGRESS NOTES
[facility] Medical Oncology Return Visit Note       Date of visit: 2025    CC: [id number], C1D8     INTERVAL HISTORY:   Burt presents today with Gloria Alvarado for [id number] Phase 3 C1D8.  Tm was high 99s at home.  Appetite and energy are stable.  No aches.  No rashes.  No nausea or vomiting.  No edema.  No face, tongue, throat, or extremity swelling.  No shortness of breath.  Feels pretty good today.  No changes in fistula pain/drainage.      ONCOLOGY HISTORY: .   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  -23, PSA 9.8, switched to Abiraterone + prednisone 5 mg PO daily + ADT  -2023, PSA 13, PSMA (stable/bone avid mets) => Apalutamide declined by insurance  -2024, PSA 18, initiated C1 docetaxel Q21D + prednisone Smg BID + ADT  PSA is 21<PSA is 19<PSA is 14.9, PSA is 9.59 as of 2024.  -: completed C10D1 docetaxel Q21D + prednisone S5mg BID + ADT.  - 24: completed C11D1 docetaxel Q21D + prednisone 5mg BID + ADT + C1D64 Eligard.  PSA 9.55  - 2024: completed C12D1 docetaxel Q21D + prednisone Smg BID + ADT. PSA 11.00  - 24 PSMA PET showing disease progression. Consult to Rad Onc.  PSA 8.89.   - 10/23/24-PSA 18.40  - 24: Pluvicto started, given every 6 weeks. Managed by [facility] Rad Onc team.  - 24- PSA 35.00  - 25: Cycle 2 Pluvicto to be given later today. Pt has Rad Onc BAYLEE fi/u for following labs and PSA.  - 25- PSA 68.00  - 25: Proceed Eligard shot today, along with Zometa.. Continues on darolutamide 600 mg BID.  - 25: Received cycle 4 Pluvicto. PSA is 84.90 on 2025.  Clear progression on PSMA PET from  with numerous new PSMA avid lesions.    - 25- Second opinion at N.  -25- [id number] Phase 3 consent signed.  Screening initiated.  Stop Darolutamide.  Reduce prednisone to  5mg daily x7 days and then 5mg every other day.    -25-C1D1 [id number] 0.1mg.  Tolerated without issues.   -25-C1D8 [id number] 0.3mg in CRU.      GENOMIC STUDIES:   Caris testing: AR-3+, TMB low, MSI indeterminate, BRCA negative    GUIDELINES USED: NCCN    INTENT OF THERAPY: Palliative     CLINICAL TRIALS:  UOX560-randomized to study drug arm     ALLERGIES: No known drug allergies    MEDS:  Current Outpatient Medications   Medication Sig Dispense Refill    acetaminophen (TYLENOL) 500 MG tablet Take 2 tablets (1,000 mg) by mouth every 6 hours. Take for the first 24 hours after discharge for all xaluritamig doses in Cycle 1. Maximum 4000 mg in 24 hours. 120 tablet 5    calcium carbonate 500 mg, elemental, (OSCAL 500) 1250 (500 Ca) MG TABS tablet Take 500 mg by mouth daily.      ciprofloxacin (CIPRO) 500 MG tablet Take 1 tablet (500 mg) by mouth 2 times daily as needed (Take only if instructed to do so by Dr. Boss or the [facility] [place] for fevers.). 14 tablet 0    dexAMETHasone (DECADRON) 4 MG tablet Take 2 tablets (8 mg) by mouth See Admin Instructions. Take 2 tablets (8 mg) the evening (approximately 12-16 hours) prior to the first 4 xaluritamig doses. Record time taken. 30 tablet 5    famotidine (PEPCID) 20 MG tablet Take 1 tablet (20 mg) by mouth 2 times daily as needed (Take when taking ibuprofen). 60 tablet 5    ibuprofen (ADVIL/MOTRIN) 200 MG tablet Take 3 tablets (600 mg) by mouth every 6 hours as needed for pain (muscle aches). 240 tablet 1    loratadine (CLARITIN) 10 MG tablet Take 10 mg by mouth daily.      methocarbamol (ROBAXIN) 750 MG tablet Take 1 tablet (750 mg) by mouth 3 times daily as needed for muscle spasms. 90 tablet 5    metroNIDAZOLE (FLAGYL) 500 MG tablet Take 1 tablet (500 mg) by mouth 3 times daily as needed (Take only if instructed to do so by Dr. Boss or the [facility] team for fevers.). 21 tablet 0    venlafaxine (EFFEXOR XR) 150 MG 24 hr capsule Take 150 mg by mouth every evening.       vitamin D3 (CHOLECALCIFEROL) 50 mcg (2000 units) tablet Take 1 tablet by mouth 2 times daily.      vitamin E (TOCOPHEROL) 400 units (180 mg) capsule Take 400 Units by mouth daily.       Current Facility-Administered Medications   Medication Dose Route Frequency Provider Last Rate Last Admin    acetaminophen (TYLENOL) tablet 1,000 mg  1,000 mg Oral Q6H Servando Boss MD   1,000 mg at 06/24/25 1007    acetaminophen (TYLENOL) tablet 1,000 mg  1,000 mg Oral Q6H PRN Servando Boss MD        albuterol (PROVENTIL HFA/VENTOLIN HFA) inhaler  1-2 puff Inhalation Once PRN Servando Boss MD        albuterol (PROVENTIL) neb solution 2.5 mg  2.5 mg Nebulization Once PRN Servando Boss MD        dexAMETHasone (DECADRON) injection 8 mg  8 mg Intravenous Once PRN Servando Boss MD        dexAMETHasone (DECADRON) tablet 8 mg  8 mg Oral Once Servando Boss MD        diphenhydrAMINE (BENADRYL) injection 25 mg  25 mg Intravenous Once PRN Servando Boss MD        diphenhydrAMINE (BENADRYL) injection 50 mg  50 mg Intravenous Once PRN Servando Boss MD        EPINEPHrine PF (ADRENALIN) injection 0.3 mg  0.3 mg Intramuscular Q5 Min PRN Servando Boss MD        famotidine (PEPCID) injection 20 mg  20 mg Intravenous Once PRN Servando Boss MD        famotidine (PEPCID) tablet 20 mg  20 mg Oral Q12H PRN Servando Boss MD        ibuprofen (ADVIL/MOTRIN) tablet 400 mg  400 mg Oral Q8H PRN Servando Boss MD        meperidine (DEMEROL) injection 25 mg  25 mg Intravenous Once PRN Servando Boss MD        methylPREDNISolone sodium succinate (SOLU-MEDROL) injection 40 mg  40 mg Intravenous Once PRN Servando Boss MD        sodium chloride 0.9% BOLUS 1,000 mL  1,000 mL Intravenous Once Servando Boss  mL/hr at 06/24/25 1007 1,000 mL at 06/24/25 1007    sodium chloride  0.9% BOLUS 1,000 mL  1,000 mL Intravenous Once PRN Servando Boss MD        study - xaluritamig () (IDS# 6454) 0.3 mg in sodium chloride 0.9% in non-PVC container 250 mL infusion  0.3 mg Intravenous Once Servando Boss  mL/hr at 06/24/25 1123 0.3 mg at 06/24/25 1123     ROS-Remainder of 14 point ROS reviewed and negative except as in HPI.    PHYSICAL EXAM:  ECOG-PS=1    BP (!) 143/98 (BP Location: Right arm, Patient Position: Supine, Cuff Size: Adult Regular)   Pulse 93   Temp 97.7  F (36.5  C) (Temporal)   Resp 17   Wt 121.7 kg (268 lb 4.8 oz)   SpO2 97%   BMI 36.94 kg/m    Exam:  Constitutional: healthy, alert, and no distress  Head: Normocephalic. No masses, lesions grossly.  No oral ulcers or lesions.    Neck: Neck supple. No adenopathy grossly.   Cardiovascular: No edema or JVD. RRR, no m/r/g appreciated.   Respiratory: normal WOB on RA, CTAB.    Gastrointestinal: negative, non-distended, non-tender, no masses or organomegaly noted   : Deferred  Musculoskeletal: extremities normal- no gross deformities noted, gait normal, and normal muscle tone  Skin: no suspicious lesions or rashes on exposed areas of skin   Neurologic: CNII-XII grossly intact, normal speech   Psychiatric: mentation appears normal and affect normal/bright    Level of consciousness alert   Orientation {time, place, and person  Vision no vision changes  Cranial nerves and brain stem functions Normal   Pyramidal and extra pyramidal motor system reflexes Normal  Muscle tone and trophic findings Normal  Coordination Finger to nose test showed normal findings   Heel to shin test showed normal findings  Normal rapid alternating movements and no pronator drift   Sensory system decreased at baseline in R 5th finger sensation  neuropsychological findings (eg, speech, cognition, and emotion). Normal    Cellular Therapy Toxicity Monitoring Note  Date: 06/24/2025   Wenceslao Finch ([id number]) is a 70 year  "old year old male who is currently day  of CAR-T cell therapy or bispecific antibody therapy.    1) CRS Grading (see table below for grading schema)  Fever:   </= 100.4  Blood pressure:   SBP >/= 90 (not hypotensive)  Oxygen saturation:    >/= 90% on room air (not hypoxic)    Current CRS stgstrstastdstest:st st1st 2) ICANS Grading (see table below for grading schema)    Patient is alert & oriented x4. There are signs of headache, muscle spasms or rigidity, or vision changes. No.    ICANS score/Neurotoxicity assessment  Score:  4   Orientation: Orientation to year, month, city, hospital: 4 points (1 point each)  3   Identify: Name 3 objects that provider points to (e.g. clock, pen, button): 3 points (1 point each)  1   Following Commands: (e.g. show me two fingers or close your eyes and stick out your tongue): 1 point  1   Writing: Ability to write a standard sentence (see writing page \"Our national bird is the bald eagle.\"): 1 point  1   Attention: Count backwards from 100 by 10s: 1 point     10   Total: Max 10    ICE Score: 0  (10 = Grade 0; 7-9 = Grade 1; 3-6 = Grade 2; 0-2 = Grade 3; 0 = Grade 4)    Seizure   No seizures    Motor Findings   No motor findings    Elevated ICP/Cerebral Edema   None    Current ICANS stgstrstastdstest:st st1st Table 1: CRS Grading per ASTCT (https://[email or web address]/10.1016/j.bbmt.[email or web address])      Tabe 2: Neurotoxicity Grading per ASTCT ([email or web address]://[email or web address]/10.1016/j.[email or web address])        LABS AND IMAGES:    Labs:   6/24/25  WBC 10.7, Hgb 11.2, plt 287  Mild hyponatremia to 134, creatinine 1.02.  LFTs and remaining lytes WNL.    CRP 13.10, ferritin 355.      IMPRESSION AND PLAN:    # metastatic castration resistant prostate cancer    Burt was referred for second opinion regarding potential clinical trials and was initially seen on May 5, 2025.  He was initially diagnosed with Yarelis 4+3 equal 7 prostate adenocarcinoma in 2016 underwent radical prostatectomy at that time.  PSA became detectable again in 2018 and he " was initiated on ADT for 2 years total, which was then held.  PSA began to rise approximately 4 months later (November 2020) after holding ADT when his PSA was 7.  He was restarted on ADT with Dr. Caro in February 2021 and continued on ADT, notably his PSA began to rise significantly in January 2022 rising from 26.8-40.  He was started on enzalutamide in January 2022 for nonmetastatic prostate cancer that was hormone resistant.  His first metastasis appeared on nuclear medicine bone scan in 2023 in the left mid sternum, prompting switch to abiraterone 1000 mg daily along with prednisone.  His PSA continued to slowly rise through January 2024 when he was started on docetaxel in the CRPC setting.  Cycle 1 day 1 of docetaxel was January 17, 2024 when his PSA was 18 he was started on Pluvicto at [facility] in November 2024 and completed 4 total cycles before restaging on April 8, 2025.  That PSMA PET scan was notable for numerous new PSMA avid lesions and a PSA of 84, demonstrating clear progression while on Pluvicto and after the typical expected timeframe for treatment related flare.    He signed consent for the [id number] and was randomized to the study drug (xaluritamig) arm.  -C1D1 on 6/16/25 at 0.1mg and tolerated without issues.    -C1D8 on 6/24 at 0.3mg.      PLAN:   Please continue to take tylenol 1000mg every 6 hours for 24 hours AFTER discharge (4 doses in total).  2. Continue to aggressively hydrate with 11 cups of water or non-caffeinated fluids daily.  3. Watch your blood pressure and temperature.  4. Let us know right away if you have a fever of 100.4F or higher, blood pressure with top number less than 100, or  oxygen level less than 90%. During the day reach out to the study team. After hours and weekends, call me directly  at [phone] regardless of the time of day.  5. Tylenol and ibuprofen are OK if you have muscle aches.  6. For pain, I typically recommend 1000mg of tylenol every 8 (EIGHT)  hours rather than every 6, which is different than  your post-discharge dosing regimen.  7. If you take ibuprofen, you can take 600mg every 6 hours. You need to take this with famotidine to protect the lining  of your stomach.  8. Let me know if you get worsening pain or symptoms around your fistula.  9. Let us know right away if you develop a rash.  10. Keep your home action kit with you if you leave home for more than 2-3 hours.  11. For routine updates (no fever, low blood pressure, or low oxygen) you can give us updates in [employer] if you can get  it to work.  12. Take dexamethasone (2x 4mg tablets) the evening prior to next week's dose as you have been doing so far.      A total of 90 minutes spent on the date of the encounter doing chart review, review of test results, interpretation of tests, patient visit, and documentation.  The patient was given the opportunity to ask multiple questions today, all of which were answered to their satisfaction.  Extended service time for patient consent, care coordination and interpretation of study enrollment criteria and labs.     The longitudinal plan of care for mCRPC was addressed during this visit. Due to the added complexity in care, I will continue to support Wenceslao Finch in the subsequent management of this condition(s) and with the ongoing continuity of care of this condition(s).     Servando Boss MD, PhD   of Medicine   Oncology/BMT/Cellular Therapies

## 2025-06-25 ENCOUNTER — TELEPHONE (OUTPATIENT)
Dept: ONCOLOGY | Facility: CLINIC | Age: 70
End: 2025-06-25
Payer: COMMERCIAL

## 2025-06-25 NOTE — TELEPHONE ENCOUNTER
[id number] for mCRPC: Telephone Encounter   Subject name: Wenceslao Finch     Visit: C1D9    Did the study visit occur within the appropriate window allowed by the protocol? yes    Since the last study visit, He has been doing very well. Exercised this morning with no issues while at PT. . Patient is adhering to his tylenol schedule last dose will be at 1600.     Patient reminded of signs and symptoms of CRS to watch for and also to contact us if he is experiencing any new symptoms.     Wenceslao Finch was given the opportunity to ask any trial related questions.    He did have a question about the dosing schedule from cycle 1 into cycle 2. I provided the protocol schedule of event. The patient also asked that his appointment occur later in the morning, and avoid Monday visits when possible. I will get to re-scheduling this patient  soon.    Yolis Pittman   Clinical Research Coordinator   [facility] [place]  [facility]/ [email or web address]  [phone]/ [phone]

## 2025-07-01 ENCOUNTER — ALLIED HEALTH/NURSE VISIT (OUTPATIENT)
Dept: ONCOLOGY | Facility: CLINIC | Age: 70
End: 2025-07-01

## 2025-07-01 ENCOUNTER — RESULTS FOLLOW-UP (OUTPATIENT)
Dept: ONCOLOGY | Facility: CLINIC | Age: 70
End: 2025-07-01

## 2025-07-01 ENCOUNTER — HOSPITAL ENCOUNTER (OUTPATIENT)
Dept: RESEARCH | Facility: CLINIC | Age: 70
Discharge: HOME | End: 2025-07-01
Attending: STUDENT IN AN ORGANIZED HEALTH CARE EDUCATION/TRAINING PROGRAM | Admitting: STUDENT IN AN ORGANIZED HEALTH CARE EDUCATION/TRAINING PROGRAM
Payer: COMMERCIAL

## 2025-07-01 VITALS
WEIGHT: 264.33 LBS | HEART RATE: 92 BPM | RESPIRATION RATE: 16 BRPM | BODY MASS INDEX: 36.4 KG/M2 | OXYGEN SATURATION: 98 % | SYSTOLIC BLOOD PRESSURE: 148 MMHG | TEMPERATURE: 98.1 F | DIASTOLIC BLOOD PRESSURE: 81 MMHG

## 2025-07-01 DIAGNOSIS — C61 PROSTATE CANCER METASTATIC TO BONE (H): Primary | ICD-10-CM

## 2025-07-01 DIAGNOSIS — Z19.2 HORMONE RESISTANT PROSTATE CANCER (H): Primary | ICD-10-CM

## 2025-07-01 DIAGNOSIS — C79.51 PROSTATE CANCER METASTATIC TO BONE (H): Primary | ICD-10-CM

## 2025-07-01 DIAGNOSIS — C61 HORMONE RESISTANT PROSTATE CANCER (H): Primary | ICD-10-CM

## 2025-07-01 LAB
ALBUMIN SERPL BCG-MCNC: 3.6 G/DL (ref 3.5–5.2)
ALP SERPL-CCNC: 54 U/L (ref 40–150)
ALT SERPL W P-5'-P-CCNC: 12 U/L (ref 0–70)
ANION GAP SERPL CALCULATED.3IONS-SCNC: 14 MMOL/L (ref 7–15)
AST SERPL W P-5'-P-CCNC: 17 U/L (ref 0–45)
BASOPHILS # BLD AUTO: 0 10E3/UL (ref 0–0.2)
BASOPHILS NFR BLD AUTO: 0 %
BILIRUB SERPL-MCNC: 0.2 MG/DL
BILIRUBIN DIRECT (ROCHE PRO & PURE): 0.11 MG/DL (ref 0–0.45)
BUN SERPL-MCNC: 21.3 MG/DL (ref 8–23)
CALCIUM SERPL-MCNC: 9.4 MG/DL (ref 8.8–10.4)
CHLORIDE SERPL-SCNC: 99 MMOL/L (ref 98–107)
CREAT SERPL-MCNC: 1.12 MG/DL (ref 0.67–1.17)
CRP SERPL-MCNC: 59.1 MG/L
EGFRCR SERPLBLD CKD-EPI 2021: 71 ML/MIN/1.73M2
EOSINOPHIL # BLD AUTO: 0 10E3/UL (ref 0–0.7)
EOSINOPHIL NFR BLD AUTO: 0 %
ERYTHROCYTE [DISTWIDTH] IN BLOOD BY AUTOMATED COUNT: 13.5 % (ref 10–15)
FERRITIN SERPL-MCNC: 438 NG/ML (ref 31–409)
GLUCOSE SERPL-MCNC: 200 MG/DL (ref 70–99)
HCO3 SERPL-SCNC: 20 MMOL/L (ref 22–29)
HCT VFR BLD AUTO: 34.8 % (ref 40–53)
HGB BLD-MCNC: 11.4 G/DL (ref 13.3–17.7)
IMM GRANULOCYTES # BLD: 0.1 10E3/UL
IMM GRANULOCYTES NFR BLD: 1 %
LYMPHOCYTES # BLD AUTO: 0.4 10E3/UL (ref 0.8–5.3)
LYMPHOCYTES NFR BLD AUTO: 3 %
MAGNESIUM SERPL-MCNC: 2.2 MG/DL (ref 1.7–2.3)
MCH RBC QN AUTO: 30.6 PG (ref 26.5–33)
MCHC RBC AUTO-ENTMCNC: 32.8 G/DL (ref 31.5–36.5)
MCV RBC AUTO: 93 FL (ref 78–100)
MONOCYTES # BLD AUTO: 0.4 10E3/UL (ref 0–1.3)
MONOCYTES NFR BLD AUTO: 3 %
NEUTROPHILS # BLD AUTO: 11.4 10E3/UL (ref 1.6–8.3)
NEUTROPHILS NFR BLD AUTO: 92 %
NRBC # BLD AUTO: 0 10E3/UL
NRBC BLD AUTO-RTO: 0 /100
PHOSPHATE SERPL-MCNC: 3.5 MG/DL (ref 2.5–4.5)
PLATELET # BLD AUTO: 327 10E3/UL (ref 150–450)
POTASSIUM SERPL-SCNC: 4.4 MMOL/L (ref 3.4–5.3)
PROT SERPL-MCNC: 7 G/DL (ref 6.4–8.3)
PSA SERPL DL<=0.01 NG/ML-MCNC: 54 NG/ML (ref 0–4.5)
RBC # BLD AUTO: 3.73 10E6/UL (ref 4.4–5.9)
RETICS # AUTO: 0.06 10E6/UL (ref 0.03–0.1)
RETICS/RBC NFR AUTO: 1.5 % (ref 0.5–2)
SODIUM SERPL-SCNC: 133 MMOL/L (ref 135–145)
URATE SERPL-MCNC: 5 MG/DL (ref 3.4–7)
WBC # BLD AUTO: 12.4 10E3/UL (ref 4–11)

## 2025-07-01 PROCEDURE — 84550 ASSAY OF BLOOD/URIC ACID: CPT | Performed by: STUDENT IN AN ORGANIZED HEALTH CARE EDUCATION/TRAINING PROGRAM

## 2025-07-01 PROCEDURE — 999N000129 HC STATISTIC PICC/MID LINE PROC CANCELLED SUBQ WORKUP

## 2025-07-01 PROCEDURE — 82728 ASSAY OF FERRITIN: CPT | Performed by: STUDENT IN AN ORGANIZED HEALTH CARE EDUCATION/TRAINING PROGRAM

## 2025-07-01 PROCEDURE — 96365 THER/PROPH/DIAG IV INF INIT: CPT

## 2025-07-01 PROCEDURE — 258N000003 HC RX IP 258 OP 636: Performed by: STUDENT IN AN ORGANIZED HEALTH CARE EDUCATION/TRAINING PROGRAM

## 2025-07-01 PROCEDURE — 86140 C-REACTIVE PROTEIN: CPT | Performed by: STUDENT IN AN ORGANIZED HEALTH CARE EDUCATION/TRAINING PROGRAM

## 2025-07-01 PROCEDURE — 82248 BILIRUBIN DIRECT: CPT | Performed by: STUDENT IN AN ORGANIZED HEALTH CARE EDUCATION/TRAINING PROGRAM

## 2025-07-01 PROCEDURE — 300N000007 HC RESEARCH B&I SPECIMEN PROCESSING, SIMPLE

## 2025-07-01 PROCEDURE — 85045 AUTOMATED RETICULOCYTE COUNT: CPT | Performed by: STUDENT IN AN ORGANIZED HEALTH CARE EDUCATION/TRAINING PROGRAM

## 2025-07-01 PROCEDURE — 250N000011 HC RX IP 250 OP 636: Performed by: STUDENT IN AN ORGANIZED HEALTH CARE EDUCATION/TRAINING PROGRAM

## 2025-07-01 PROCEDURE — 250N000013 HC RX MED GY IP 250 OP 250 PS 637: Performed by: STUDENT IN AN ORGANIZED HEALTH CARE EDUCATION/TRAINING PROGRAM

## 2025-07-01 PROCEDURE — 510N000023 HC CRU B&I PATIENT CARE, PER 15 MIN

## 2025-07-01 PROCEDURE — 36000 PLACE NEEDLE IN VEIN: CPT

## 2025-07-01 PROCEDURE — 83735 ASSAY OF MAGNESIUM: CPT | Performed by: STUDENT IN AN ORGANIZED HEALTH CARE EDUCATION/TRAINING PROGRAM

## 2025-07-01 PROCEDURE — 80076 HEPATIC FUNCTION PANEL: CPT | Performed by: STUDENT IN AN ORGANIZED HEALTH CARE EDUCATION/TRAINING PROGRAM

## 2025-07-01 PROCEDURE — 84153 ASSAY OF PSA TOTAL: CPT | Performed by: STUDENT IN AN ORGANIZED HEALTH CARE EDUCATION/TRAINING PROGRAM

## 2025-07-01 PROCEDURE — 84100 ASSAY OF PHOSPHORUS: CPT | Performed by: STUDENT IN AN ORGANIZED HEALTH CARE EDUCATION/TRAINING PROGRAM

## 2025-07-01 PROCEDURE — 300N000025 HC RESEARCH LAB DELIVERY SURCHARGE, PER DELIVERY

## 2025-07-01 PROCEDURE — 510N000029 HC RESEARCH B&I MEALS, PER MEAL

## 2025-07-01 PROCEDURE — 510N000025 HC RESEARCH B&I EARLY OR LATE SURCHARGE

## 2025-07-01 PROCEDURE — 36415 COLL VENOUS BLD VENIPUNCTURE: CPT | Performed by: STUDENT IN AN ORGANIZED HEALTH CARE EDUCATION/TRAINING PROGRAM

## 2025-07-01 PROCEDURE — 510N000009 HC RESEARCH FACILITY, PER 15 MIN

## 2025-07-01 PROCEDURE — 85018 HEMOGLOBIN: CPT | Performed by: STUDENT IN AN ORGANIZED HEALTH CARE EDUCATION/TRAINING PROGRAM

## 2025-07-01 PROCEDURE — 250N000012 HC RX MED GY IP 250 OP 636 PS 637: Performed by: STUDENT IN AN ORGANIZED HEALTH CARE EDUCATION/TRAINING PROGRAM

## 2025-07-01 RX ORDER — HEPARIN SODIUM (PORCINE) LOCK FLUSH IV SOLN 100 UNIT/ML 100 UNIT/ML
5-10 SOLUTION INTRAVENOUS
Status: DISCONTINUED | OUTPATIENT
Start: 2025-07-01 | End: 2025-07-02 | Stop reason: HOSPADM

## 2025-07-01 RX ORDER — DEXAMETHASONE 4 MG/1
8 TABLET ORAL ONCE
Status: COMPLETED | OUTPATIENT
Start: 2025-07-01 | End: 2025-07-01

## 2025-07-01 RX ORDER — HEPARIN SODIUM,PORCINE 10 UNIT/ML
5-10 VIAL (ML) INTRAVENOUS EVERY 24 HOURS
Status: DISCONTINUED | OUTPATIENT
Start: 2025-07-01 | End: 2025-07-02 | Stop reason: HOSPADM

## 2025-07-01 RX ORDER — METHYLPREDNISOLONE SODIUM SUCCINATE 40 MG/ML
40 INJECTION INTRAMUSCULAR; INTRAVENOUS
Status: DISCONTINUED | OUTPATIENT
Start: 2025-07-01 | End: 2025-07-02 | Stop reason: HOSPADM

## 2025-07-01 RX ORDER — DIPHENHYDRAMINE HYDROCHLORIDE 50 MG/ML
50 INJECTION, SOLUTION INTRAMUSCULAR; INTRAVENOUS
Status: DISCONTINUED | OUTPATIENT
Start: 2025-07-01 | End: 2025-07-02 | Stop reason: HOSPADM

## 2025-07-01 RX ORDER — ACETAMINOPHEN 500 MG
1000 TABLET ORAL EVERY 6 HOURS
Status: DISCONTINUED | OUTPATIENT
Start: 2025-07-01 | End: 2025-07-02 | Stop reason: HOSPADM

## 2025-07-01 RX ORDER — HEPARIN SODIUM (PORCINE) LOCK FLUSH IV SOLN 100 UNIT/ML 100 UNIT/ML
SOLUTION INTRAVENOUS
Status: COMPLETED
Start: 2025-07-01 | End: 2025-07-01

## 2025-07-01 RX ORDER — FAMOTIDINE 20 MG/1
20 TABLET, FILM COATED ORAL EVERY 12 HOURS PRN
Status: DISCONTINUED | OUTPATIENT
Start: 2025-07-01 | End: 2025-07-02 | Stop reason: HOSPADM

## 2025-07-01 RX ORDER — DIPHENHYDRAMINE HYDROCHLORIDE 50 MG/ML
25 INJECTION, SOLUTION INTRAMUSCULAR; INTRAVENOUS
Status: DISCONTINUED | OUTPATIENT
Start: 2025-07-01 | End: 2025-07-02 | Stop reason: HOSPADM

## 2025-07-01 RX ORDER — HEPARIN SODIUM,PORCINE 10 UNIT/ML
5-10 VIAL (ML) INTRAVENOUS
Status: DISCONTINUED | OUTPATIENT
Start: 2025-07-01 | End: 2025-07-02 | Stop reason: HOSPADM

## 2025-07-01 RX ORDER — ACETAMINOPHEN 500 MG
1000 TABLET ORAL EVERY 6 HOURS PRN
Status: DISCONTINUED | OUTPATIENT
Start: 2025-07-01 | End: 2025-07-02 | Stop reason: HOSPADM

## 2025-07-01 RX ORDER — EPINEPHRINE 1 MG/ML
0.3 INJECTION, SOLUTION, CONCENTRATE INTRAVENOUS EVERY 5 MIN PRN
Status: DISCONTINUED | OUTPATIENT
Start: 2025-07-01 | End: 2025-07-02 | Stop reason: HOSPADM

## 2025-07-01 RX ORDER — ALBUTEROL SULFATE 90 UG/1
1-2 INHALANT RESPIRATORY (INHALATION)
Status: DISCONTINUED | OUTPATIENT
Start: 2025-07-01 | End: 2025-07-02 | Stop reason: HOSPADM

## 2025-07-01 RX ORDER — ALBUTEROL SULFATE 0.83 MG/ML
2.5 SOLUTION RESPIRATORY (INHALATION)
Status: DISCONTINUED | OUTPATIENT
Start: 2025-07-01 | End: 2025-07-02 | Stop reason: HOSPADM

## 2025-07-01 RX ORDER — IBUPROFEN 200 MG
400 TABLET ORAL EVERY 8 HOURS PRN
Status: DISCONTINUED | OUTPATIENT
Start: 2025-07-01 | End: 2025-07-02 | Stop reason: HOSPADM

## 2025-07-01 RX ORDER — DEXAMETHASONE SODIUM PHOSPHATE 4 MG/ML
8 INJECTION, SOLUTION INTRA-ARTICULAR; INTRALESIONAL; INTRAMUSCULAR; INTRAVENOUS; SOFT TISSUE
Status: DISCONTINUED | OUTPATIENT
Start: 2025-07-01 | End: 2025-07-02 | Stop reason: HOSPADM

## 2025-07-01 RX ORDER — MEPERIDINE HYDROCHLORIDE 25 MG/ML
25 INJECTION INTRAMUSCULAR; INTRAVENOUS; SUBCUTANEOUS
Status: DISCONTINUED | OUTPATIENT
Start: 2025-07-01 | End: 2025-07-02 | Stop reason: HOSPADM

## 2025-07-01 RX ADMIN — SODIUM CHLORIDE 1000 ML: 9 INJECTION, SOLUTION INTRAVENOUS at 12:11

## 2025-07-01 RX ADMIN — ACETAMINOPHEN 1000 MG: 500 TABLET ORAL at 18:04

## 2025-07-01 RX ADMIN — ACETAMINOPHEN 1000 MG: 500 TABLET ORAL at 12:06

## 2025-07-01 RX ADMIN — DEXAMETHASONE 8 MG: 4 TABLET ORAL at 12:06

## 2025-07-01 RX ADMIN — DEXAMETHASONE 8 MG: 4 TABLET ORAL at 18:26

## 2025-07-01 RX ADMIN — Medication 5 ML: at 18:27

## 2025-07-01 RX ADMIN — HEPARIN SODIUM (PORCINE) LOCK FLUSH IV SOLN 100 UNIT/ML 5 ML: 100 SOLUTION at 18:27

## 2025-07-01 NOTE — PROGRESS NOTES
Clinical Research Unit Nursing Note:  Wenceslao MENJIVAR Josette presents today for Cycle 1 Day 15, Study- Ph 3 AMG-509 (IDS #[id number]).     Patient seen by provider today: Yes. Dr. Boss                             present during visit today: Not Applicable.     Note:   Cycle 1 Day 15. Pre-infusion labs drawn, okay to proceed.Tolerated previous infusion without incident.    Intravenous Access:  Port accessed x1 / 1 peripheral IV(s) placed.    Pre-medications:  Dexamethasone PO 8 mg(home dose) taken @ 2100 per patient.  NS 1 L IV Started: @ 1210 over 4 hours  Acetaminophen PO 1000 mg @ 1207  Dexamethasone PO 8 mg @ 1207    Study - AMG-509 (IDS# 6367) intravenous infusion:   Chemo Double Check  Protocol verified?: Yes  Height and weight verified?: Yes  BSA confirmed?: N/A  Meets treatment parameters?: Yes  Initial RN verification: Karol Vieyra RN  Second RN verification: Yael Harrington RN    Dose: 1.0 mg  Infusion started at 1316. End of infusion (with 20 ml NS flush) ended at: 1425    Post Infusion Assessment:  Vitals and labs obtained per protocol.     Patient Vitals for the past 24 hrs:   BP Temp Temp src Pulse Resp SpO2 Weight   07/01/25 1830 (!) 148/81 98.1  F (36.7  C) Temporal 92 16 98 % --   07/01/25 1619 (!) 153/89 -- -- -- -- -- --   07/01/25 1617 (!) 158/84 97.5  F (36.4  C) Temporal 93 18 98 % --   07/01/25 1426 (!) 148/89 96.8  F (36  C) Temporal 93 17 98 % --   07/01/25 1310 (!) 147/89 98.1  F (36.7  C) Temporal 82 18 97 % --   07/01/25 0956 135/88 97.9  F (36.6  C) Temporal 111 19 98 % 119.9 kg (264 lb 5.3 oz)        CRS Grading ~2 Hours Post EOI 6 Hours Post EOI   Time of Assessment: 1619 1829   Fever: </= 100.4 </= 100.4   Blood pressure: SBP >/= 90 (not hypotensive) SBP >/= 90 (not hypotensive)   Oxygen saturation: >/= 90% on room air (not hypoxic) >/= 90% on room air (not hypoxic)   Current CRS stgstrstastdstest:st st1st 0         ICE Score ~2 Hours Post EOI 6 Hours Post EOI   Time of Assessment: [id number]   There  "are signs of headache, muscle spasms or rigidity, or vision changes. No. No.   Orientation: Orientation to year, month, city, hospital: 4 points (1 point each) 4 4   Identify: Name 3 objects that provider points to (e.g. clock, pen, button): 3 points (1 point each) 3 3   Following Commands: (e.g. show me two fingers or close your eyes and stick out your tongue): 1 point 1 1   Writing: Ability to write a standard sentence (see writing page \"Our national bird is the bald eagle.\"): 1 point 1 1   Attention: Count backwards from 100 by 10s: 1 point   1 1   Total: Max 10 10 10     Observed for 4 hrs post infusion. Patient tolerated infusion without incident. Patient afebrile without signs of neurotoxicity.  Denies new symptoms. No new muscle pain, muscle weakness, joint pain, or soft tissue swelling.    Post Infusion Medications:  Acetaminophen PO 1000 mg @ 1805  Dexamethasone PO 8 mg @ 1828    Discharge Plan:   Ok to discharge. Access discontinued per protocol.   Patient instructed to remain within 1 hour hospital proximity for 24 hours and take tylenol every 6 hours for 24 hours. Discharge AVS provided to patient.    Patient discharged in stable condition accompanied by: wife.  Departure Mode: Ambulatory.    Karol Vieyra RN on 7/1/2025 at 12:45 PM  - 1515  Yael Harrington RN on 7/1/2025 at [phone]      Table 1: CRS Grading per ASTCT (https://[email or web address]/j.bbmt.[email or web address])        "

## 2025-07-01 NOTE — PATIENT INSTRUCTIONS
Please continue to take tylenol 1000mg every 6 hours for 24 hours AFTER discharge (4 doses in total).   2.   Once you are done with that 24 hours of required tylenol, I would decrease to 1000mg every 8 hours of tylenol and 600mg every 6 hours of ibuprofen even if you are feeling well.  This helps reduce the muscle aches and stiffness that you felt over the weekend after your second dose.    3. Continue to aggressively hydrate with 11 cups of water or non-caffeinated fluids daily.  4. Watch your blood pressure and temperature.  5. Let us know right away if you have a fever of 100.4F or higher, blood pressure with top number less than 100, or oxygen level less than 90%. During the day reach out to the study team. After hours and weekends, call me directly at [phone] regardless of the time of day.  7. If you take ibuprofen, you need to take this with famotidine to protect the lining of your stomach.  8. Let me know if you get worsening pain or symptoms around your fistula.  9. Let us know right away if you develop a rash.  10. Keep your home action kit with you if you leave home for more than 2-3 hours.  11. For routine updates (no fever, low blood pressure, or low oxygen) you can give us updates in MyChart.  12. Take dexamethasone (2x 4mg tablets) the evening prior to next week's dose as you have been doing so far.

## 2025-07-01 NOTE — PROGRESS NOTES
[id number]: Study Visit Note   Subject name: Wenceslao Finch     Visit: C1D15    Did the study visit occur within the appropriate window allowed by the protocol? yes    Since the last study visit, He has been doing relatively well. Pt had one day of fatigue, throat soreness and muscle aches which were managed with rest and PRN tylenol and ibuprofen. Symptoms resolved the next day. Pt feeling well today, energy level good. Pt will follow post dose recommendations and reach out to PI and RN with any concerning symptoms following his 3rd step up dose today.    I have personally interviewed Wenceslao Finch and reviewed his medical record for adverse events and concomitant medications and these have been recorded on the corresponding logs in Wenceslao Finch's research file.     Wenceslao Finch was given the opportunity to ask any trial related questions.  Please see provider progress note for physical exam and other clinical information. Labs were reviewed - any significant lab values were addressed and reviewed.    Liat Galindo RN

## 2025-07-01 NOTE — PROGRESS NOTES
[facility] Medical Oncology Return Visit Note       Date of visit: 2025    CC: [id number], C1D15    INTERVAL HISTORY:   Burt presents today with Gloria Alvarado for IUQ317 Phase 3 C1D15.  He had a rough weekend with aches starting late Friday into Saturday, particularly in his shoulder.  Throat was also sore.  He did not note any swelling of his mouth, tongue or throat nor was there any related shortness of breath or difficulty breathing.  He was very fatigued as well.  Feeling really good today otherwise.  Felt much better after 2 tylenol and ibuprofen and sleeping for 12 hours on .  No pain or aches today.  Mouth is no longer sore.  Feels ready to proceed with 1mg dose as scheduled today. No changes in fistula feeling or output.      ONCOLOGY HISTORY: .   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  -23, PSA 9.8, switched to Abiraterone + prednisone 5 mg PO daily + ADT  -2023, PSA 13, PSMA (stable/bone avid mets) => Apalutamide declined by insurance  -2024, PSA 18, initiated C1 docetaxel Q21D + prednisone Smg BID + ADT  PSA is 21<PSA is 19<PSA is 14.9, PSA is 9.59 as of 2024.  -: completed C10D1 docetaxel Q21D + prednisone S5mg BID + ADT.  - 24: completed C11D1 docetaxel Q21D + prednisone 5mg BID + ADT + C1D64 Eligard.  PSA 9.55  - 2024: completed C12D1 docetaxel Q21D + prednisone Smg BID + ADT. PSA 11.00  - 24 PSMA PET showing disease progression. Consult to Rad Onc.  PSA 8.89.   - 10/23/24-PSA 18.40  - 24: Pluvicto started, given every 6 weeks. Managed by [facility] Rad Onc team.  - 24- PSA 35.00  - 25: Cycle 2 Pluvicto to be given later today. Pt has Rad Onc BAYLEE fi/u for following labs and PSA.  - 25- PSA 68.00  - 25: Proceed Eligard shot today, along with Zometa.. Continues on  darolutamide 600 mg BID.  - 4/1/25: Received cycle 4 Pluvicto. PSA is 84.90 on 4/9/2025.  Clear progression on PSMA PET from 4/8 with numerous new PSMA avid lesions.    - 5/5/25- Second opinion at [facility].  -5/19/25- [id number] Phase 3 consent signed.  Screening initiated.  Stop Darolutamide.  Reduce prednisone to 5mg daily x7 days and then 5mg every other day.    -6/17/25-C1D1 [id number] 0.1mg.  Tolerated without issues.   -6/24/25-C1D8 [id number] 0.3mg in CRU.    -7/1/25-C1D15 [id number] 1mg in CRU.  PSA 54.00 pre-infusion.       ALLERGIES: No known drug allergies    MEDS:  Current Outpatient Medications   Medication Sig Dispense Refill    acetaminophen (TYLENOL) 500 MG tablet Take 2 tablets (1,000 mg) by mouth every 6 hours. Take for the first 24 hours after discharge for all xaluritamig doses in Cycle 1. Maximum 4000 mg in 24 hours. 120 tablet 5    calcium carbonate 500 mg, elemental, (OSCAL 500) 1250 (500 Ca) MG TABS tablet Take 500 mg by mouth daily.      ciprofloxacin (CIPRO) 500 MG tablet Take 1 tablet (500 mg) by mouth 2 times daily as needed (Take only if instructed to do so by Dr. Boss or the [id number] team for fevers.). 14 tablet 0    dexAMETHasone (DECADRON) 4 MG tablet Take 2 tablets (8 mg) by mouth See Admin Instructions. Take 2 tablets (8 mg) the evening (approximately 12-16 hours) prior to the first 4 xaluritamig doses. Record time taken. 30 tablet 5    famotidine (PEPCID) 20 MG tablet Take 1 tablet (20 mg) by mouth 2 times daily as needed (Take when taking ibuprofen). 60 tablet 5    ibuprofen (ADVIL/MOTRIN) 200 MG tablet Take 3 tablets (600 mg) by mouth every 6 hours as needed for pain (muscle aches). 240 tablet 1    loratadine (CLARITIN) 10 MG tablet Take 10 mg by mouth daily.      methocarbamol (ROBAXIN) 750 MG tablet Take 1 tablet (750 mg) by mouth 3 times daily as needed for muscle spasms. 90 tablet 5    metroNIDAZOLE (FLAGYL) 500 MG tablet Take 1 tablet (500 mg) by mouth 3 times daily as needed (Take only if  instructed to do so by Dr. Boss or the [facility] team for fevers.). 21 tablet 0    venlafaxine (EFFEXOR XR) 150 MG 24 hr capsule Take 150 mg by mouth every evening.      vitamin D3 (CHOLECALCIFEROL) 50 mcg (2000 units) tablet Take 1 tablet by mouth 2 times daily.      vitamin E (TOCOPHEROL) 400 units (180 mg) capsule Take 400 Units by mouth daily.       Current Facility-Administered Medications   Medication Dose Route Frequency Provider Last Rate Last Admin    acetaminophen (TYLENOL) tablet 1,000 mg  1,000 mg Oral Q6H Servando Boss MD   1,000 mg at 07/01/25 1206    acetaminophen (TYLENOL) tablet 1,000 mg  1,000 mg Oral Q6H PRN Servando Boss MD        albuterol (PROVENTIL HFA/VENTOLIN HFA) inhaler  1-2 puff Inhalation Once PRN Servando Boss MD        albuterol (PROVENTIL) neb solution 2.5 mg  2.5 mg Nebulization Once PRN Servando Boss MD        dexAMETHasone (DECADRON) injection 8 mg  8 mg Intravenous Once PRN Servando Bsos MD        dexAMETHasone (DECADRON) tablet 8 mg  8 mg Oral Once Servando Boss MD        diphenhydrAMINE (BENADRYL) injection 25 mg  25 mg Intravenous Once PRN Servando Boss MD        diphenhydrAMINE (BENADRYL) injection 50 mg  50 mg Intravenous Once PRN Servando Boss MD        EPINEPHrine PF (ADRENALIN) injection 0.3 mg  0.3 mg Intramuscular Q5 Min PRN Servando Boss MD        famotidine (PEPCID) injection 20 mg  20 mg Intravenous Once PRN Servando Boss MD        famotidine (PEPCID) tablet 20 mg  20 mg Oral Q12H PRN Servando Boss MD        ibuprofen (ADVIL/MOTRIN) tablet 400 mg  400 mg Oral Q8H PRN Servando Boss MD        meperidine (DEMEROL) injection 25 mg  25 mg Intravenous Once PRN Servando Boss MD        methylPREDNISolone sodium succinate (SOLU-MEDROL) injection 40 mg  40 mg Intravenous Once PRN Servando Boss MD         sodium chloride 0.9% BOLUS 1,000 mL  1,000 mL Intravenous Once Servando Boss  mL/hr at 07/01/25 1211 1,000 mL at 07/01/25 1211    sodium chloride 0.9% BOLUS 1,000 mL  1,000 mL Intravenous Once PRN Servando Boss MD         ROS-Remainder of 14 point ROS reviewed and negative except as in HPI.    PHYSICAL EXAM:  ECOG-PS=1    BP (!) 147/89 (BP Location: Right arm)   Pulse 82   Temp 98.1  F (36.7  C) (Temporal)   Resp 18   Wt 119.9 kg (264 lb 5.3 oz)   SpO2 97%   BMI 36.40 kg/m    Exam:  Constitutional: healthy, alert, and no distress  Head: Normocephalic. No masses, lesions grossly.  No oral ulcers or lesions.  No oral erythema.    Neck: Neck supple. No adenopathy grossly.   Cardiovascular: No edema or JVD. RRR, no m/r/g appreciated.   Respiratory: normal WOB on RA, CTAB.    Gastrointestinal: negative, non-distended, non-tender, no masses or organomegaly noted   : Deferred  Musculoskeletal: extremities normal- no gross deformities noted, gait normal, and normal muscle tone  Skin: no suspicious lesions or rashes on exposed areas of skin   Neurologic: CNII-XII grossly intact, normal speech   Psychiatric: mentation appears normal and affect normal/bright    Level of consciousness alert   Orientation {time, place, and person  Vision no vision changes  Cranial nerves and brain stem functions Normal   Pyramidal and extra pyramidal motor system reflexes Normal  Muscle tone and trophic findings Normal  Coordination Finger to nose test showed normal findings   Heel to shin test showed normal findings  Normal rapid alternating movements and no pronator drift   Sensory system decreased at baseline in R 5th finger sensation  neuropsychological findings (eg, speech, cognition, and emotion). Normal    Cellular Therapy Toxicity Monitoring Note  Date: 07/01/2025   Wenceslao Finch ([id number]) is a 70 year old year old male who is currently day  of CAR-T cell therapy or bispecific antibody  "therapy.    1) CRS Grading (see table below for grading schema)  Fever:   </= 100.4  Blood pressure:   SBP >/= 90 (not hypotensive)  Oxygen saturation:    >/= 90% on room air (not hypoxic)    Current CRS stgstrstastdstest:st st1st 2) ICANS Grading (see table below for grading schema)    Patient is alert & oriented x4. There are signs of headache, muscle spasms or rigidity, or vision changes. No.    ICANS score/Neurotoxicity assessment  Score:  4   Orientation: Orientation to year, month, city, hospital: 4 points (1 point each)  3   Identify: Name 3 objects that provider points to (e.g. clock, pen, button): 3 points (1 point each)  1   Following Commands: (e.g. show me two fingers or close your eyes and stick out your tongue): 1 point  1   Writing: Ability to write a standard sentence (see writing page \"Our national bird is the bald eagle.\"): 1 point  1   Attention: Count backwards from 100 by 10s: 1 point     10   Total: Max 10    ICE Score: 0  (10 = Grade 0; 7-9 = Grade 1; 3-6 = Grade 2; 0-2 = Grade 3; 0 = Grade 4)    Seizure   No seizures    Motor Findings   No motor findings    Elevated ICP/Cerebral Edema   None    Current ICANS stgstrstastdstest:st st1st Table 1: CRS Grading per ASTCT (https://[email or web address]/10.1016/j.bbmt.[email or web address])      Tabe 2: Neurotoxicity Grading per ASTCT (https://[email or web address]/10.1016/j.bbmt.[email or web address])        LABS AND IMAGES:    Labs:   7/1/25  Na 133, CO2 20, remaining lytes, creatinine and LFTs WNL.     Glucose 200, AG14    CRP 59.10, ferritin 438    WBC 12.4, ANC 11.4, Hgb 11.4, Plt 327     IMPRESSION AND PLAN:    # metastatic castration resistant prostate cancer    Burt was referred for second opinion regarding potential clinical trials and was initially seen on May 5, 2025.  He was initially diagnosed with [place] 4+3 equal 7 prostate adenocarcinoma in 2016 underwent radical prostatectomy at that time.  PSA became detectable again in 2018 and he was initiated on ADT for 2 years total, which was then held.  PSA began " to rise approximately 4 months later (November 2020) after holding ADT when his PSA was 7.  He was restarted on ADT with Dr. Caro in February 2021 and continued on ADT, notably his PSA began to rise significantly in January 2022 rising from 26.8-40.  He was started on enzalutamide in January 2022 for nonmetastatic prostate cancer that was hormone resistant.  His first metastasis appeared on nuclear medicine bone scan in 2023 in the left mid sternum, prompting switch to abiraterone 1000 mg daily along with prednisone.  His PSA continued to slowly rise through January 2024 when he was started on docetaxel in the CRPC setting.  Cycle 1 day 1 of docetaxel was January 17, 2024 when his PSA was 18 he was started on Pluvicto at [facility] in November 2024 and completed 4 total cycles before restaging on April 8, 2025.  That PSMA PET scan was notable for numerous new PSMA avid lesions and a PSA of 84, demonstrating clear progression while on Pluvicto and after the typical expected timeframe for treatment related flare.    He signed consent for the ISW453 and was randomized to the study drug (xaluritamig) arm.  -C1D1 on 6/16/25 at 0.1mg and tolerated without issues.    -C1D8 on 6/24 at 0.3mg.  Muscle aches and sore throat ~4 days after infusion, resolved after 2 tylenol and ibuprofen.    -C1D15 on 7/1/25 at 1mg.      PLAN:   Please continue to take tylenol 1000mg every 6 hours for 24 hours AFTER discharge (4 doses in total).   2.   Once you are done with that 24 hours of required tylenol, I would decrease to 1000mg every 8 hours of tylenol and 600mg every 6 hours of ibuprofen even if you are feeling well.  This helps reduce the muscle aches and stiffness that you felt over the weekend after your second dose.    3. Continue to aggressively hydrate with 11 cups of water or non-caffeinated fluids daily.  4. Watch your blood pressure and temperature.  5. Let us know right away if you have a fever of 100.4F or higher,  blood pressure with top number less than 100, or oxygen level less than 90%. During the day reach out to the study team. After hours and weekends, call me directly at [phone] regardless of the time of day.  7. If you take ibuprofen, you need to take this with famotidine to protect the lining of your stomach.  8. Let me know if you get worsening pain or symptoms around your fistula.  9. Let us know right away if you develop a rash.  10. Keep your home action kit with you if you leave home for more than 2-3 hours.  11. For routine updates (no fever, low blood pressure, or low oxygen) you can give us updates in MyChart.  12. Take dexamethasone (2x 4mg tablets) the evening prior to next week's dose as you have been doing so far.      A total of 75 minutes spent on the date of the encounter doing chart review, review of test results, interpretation of tests, patient visit, and documentation.  The patient was given the opportunity to ask multiple questions today, all of which were answered to their satisfaction.  Extended service time for patient management, monitoring and counseling. Pt seen and evaluated multiple times during CRU visit today.     The longitudinal plan of care for mCRPC was addressed during this visit. Due to the added complexity in care, I will continue to support Wenceslao Finch in the subsequent management of this condition(s) and with the ongoing continuity of care of this condition(s).     Servando Boss MD, PhD   of Medicine   Oncology/BMT/Cellular Therapies

## 2025-07-02 ENCOUNTER — ALLIED HEALTH/NURSE VISIT (OUTPATIENT)
Dept: ONCOLOGY | Facility: CLINIC | Age: 70
End: 2025-07-02
Payer: COMMERCIAL

## 2025-07-02 DIAGNOSIS — C79.51 PROSTATE CANCER METASTATIC TO BONE (H): Primary | ICD-10-CM

## 2025-07-02 DIAGNOSIS — C61 PROSTATE CANCER METASTATIC TO BONE (H): Primary | ICD-10-CM

## 2025-07-02 NOTE — PROGRESS NOTES
[id number]: Study Visit Note   Subject name: Wenceslao Finch     Visit: C1D16    Did the study visit occur within the appropriate window allowed by the protocol? yes    Since the last study visit, He has been doing well. Pt went to physical therapy today and is feeling well. No symptoms or complaints to report day after his 3rd in infusion.    I have personally interviewed Wenceslao Finch and reviewed his medical record for adverse events and concomitant medications and these have been recorded on the corresponding logs in Wenceslao Finch's research file.     Wenceslao Finch was given the opportunity to ask any trial related questions.  Please see provider progress note for physical exam and other clinical information. Labs were reviewed - any significant lab values were addressed and reviewed.    Liat Galindo RN

## 2025-07-02 NOTE — ADDENDUM NOTE
Encounter addended by: Angela Agudelo on: 7/2/2025 8:16 AM   Actions taken: Charge Capture section accepted

## 2025-07-07 ENCOUNTER — ALLIED HEALTH/NURSE VISIT (OUTPATIENT)
Dept: ONCOLOGY | Facility: CLINIC | Age: 70
End: 2025-07-07
Payer: COMMERCIAL

## 2025-07-07 DIAGNOSIS — C79.51 PROSTATE CANCER METASTATIC TO BONE (H): Primary | ICD-10-CM

## 2025-07-07 DIAGNOSIS — C61 PROSTATE CANCER METASTATIC TO BONE (H): Primary | ICD-10-CM

## 2025-07-07 DIAGNOSIS — Z19.2 HORMONE RESISTANT PROSTATE CANCER (H): ICD-10-CM

## 2025-07-07 DIAGNOSIS — Z19.2 METASTATIC CASTRATION-RESISTANT ADENOCARCINOMA OF PROSTATE (H): ICD-10-CM

## 2025-07-07 DIAGNOSIS — C61 METASTATIC CASTRATION-RESISTANT ADENOCARCINOMA OF PROSTATE (H): ICD-10-CM

## 2025-07-07 DIAGNOSIS — C61 HORMONE RESISTANT PROSTATE CANCER (H): ICD-10-CM

## 2025-07-08 ENCOUNTER — HOSPITAL ENCOUNTER (OUTPATIENT)
Dept: RESEARCH | Facility: CLINIC | Age: 70
Discharge: HOME | End: 2025-07-08
Attending: STUDENT IN AN ORGANIZED HEALTH CARE EDUCATION/TRAINING PROGRAM | Admitting: STUDENT IN AN ORGANIZED HEALTH CARE EDUCATION/TRAINING PROGRAM
Payer: COMMERCIAL

## 2025-07-08 ENCOUNTER — ALLIED HEALTH/NURSE VISIT (OUTPATIENT)
Dept: ONCOLOGY | Facility: CLINIC | Age: 70
End: 2025-07-08

## 2025-07-08 VITALS
SYSTOLIC BLOOD PRESSURE: 151 MMHG | BODY MASS INDEX: 36.67 KG/M2 | OXYGEN SATURATION: 93 % | RESPIRATION RATE: 18 BRPM | WEIGHT: 266.32 LBS | DIASTOLIC BLOOD PRESSURE: 89 MMHG | HEART RATE: 104 BPM | TEMPERATURE: 97.2 F

## 2025-07-08 DIAGNOSIS — C79.51 PROSTATE CANCER METASTATIC TO BONE (H): Primary | ICD-10-CM

## 2025-07-08 DIAGNOSIS — C61 HORMONE RESISTANT PROSTATE CANCER (H): Primary | ICD-10-CM

## 2025-07-08 DIAGNOSIS — Z00.6 EXAMINATION OF PARTICIPANT OR CONTROL IN CLINICAL RESEARCH: ICD-10-CM

## 2025-07-08 DIAGNOSIS — Z19.2 HORMONE RESISTANT PROSTATE CANCER (H): Primary | ICD-10-CM

## 2025-07-08 DIAGNOSIS — C61 PROSTATE CANCER METASTATIC TO BONE (H): Primary | ICD-10-CM

## 2025-07-08 LAB
ALBUMIN SERPL BCG-MCNC: 3.3 G/DL (ref 3.5–5.2)
ALP SERPL-CCNC: 59 U/L (ref 40–150)
ALT SERPL W P-5'-P-CCNC: 15 U/L (ref 0–70)
ANION GAP SERPL CALCULATED.3IONS-SCNC: 14 MMOL/L (ref 7–15)
AST SERPL W P-5'-P-CCNC: 17 U/L (ref 0–45)
BASOPHILS # BLD AUTO: 0 10E3/UL (ref 0–0.2)
BASOPHILS NFR BLD AUTO: 0 %
BILIRUB SERPL-MCNC: 0.2 MG/DL
BILIRUBIN DIRECT (ROCHE PRO & PURE): 0.11 MG/DL (ref 0–0.45)
BUN SERPL-MCNC: 17.8 MG/DL (ref 8–23)
CALCIUM SERPL-MCNC: 9.5 MG/DL (ref 8.8–10.4)
CHLORIDE SERPL-SCNC: 98 MMOL/L (ref 98–107)
CREAT SERPL-MCNC: 1.09 MG/DL (ref 0.67–1.17)
CRP SERPL-MCNC: 329 MG/L
EGFRCR SERPLBLD CKD-EPI 2021: 73 ML/MIN/1.73M2
EOSINOPHIL # BLD AUTO: 0 10E3/UL (ref 0–0.7)
EOSINOPHIL NFR BLD AUTO: 0 %
ERYTHROCYTE [DISTWIDTH] IN BLOOD BY AUTOMATED COUNT: 13.7 % (ref 10–15)
FERRITIN SERPL-MCNC: 801 NG/ML (ref 31–409)
GLUCOSE SERPL-MCNC: 208 MG/DL (ref 70–99)
HCO3 SERPL-SCNC: 20 MMOL/L (ref 22–29)
HCT VFR BLD AUTO: 30.9 % (ref 40–53)
HGB BLD-MCNC: 10.4 G/DL (ref 13.3–17.7)
IMM GRANULOCYTES # BLD: 0.2 10E3/UL
IMM GRANULOCYTES NFR BLD: 1 %
LYMPHOCYTES # BLD AUTO: 0.3 10E3/UL (ref 0.8–5.3)
LYMPHOCYTES NFR BLD AUTO: 2 %
MCH RBC QN AUTO: 30.3 PG (ref 26.5–33)
MCHC RBC AUTO-ENTMCNC: 33.7 G/DL (ref 31.5–36.5)
MCV RBC AUTO: 90 FL (ref 78–100)
MONOCYTES # BLD AUTO: 0.3 10E3/UL (ref 0–1.3)
MONOCYTES NFR BLD AUTO: 2 %
NEUTROPHILS # BLD AUTO: 13.1 10E3/UL (ref 1.6–8.3)
NEUTROPHILS NFR BLD AUTO: 95 %
NRBC # BLD AUTO: 0 10E3/UL
NRBC BLD AUTO-RTO: 0 /100
PLATELET # BLD AUTO: 312 10E3/UL (ref 150–450)
POTASSIUM SERPL-SCNC: 4.4 MMOL/L (ref 3.4–5.3)
PROT SERPL-MCNC: 7.1 G/DL (ref 6.4–8.3)
PSA SERPL DL<=0.01 NG/ML-MCNC: 15.8 NG/ML (ref 0–4.5)
RBC # BLD AUTO: 3.43 10E6/UL (ref 4.4–5.9)
SODIUM SERPL-SCNC: 132 MMOL/L (ref 135–145)
URATE SERPL-MCNC: 4.4 MG/DL (ref 3.4–7)
WBC # BLD AUTO: 13.8 10E3/UL (ref 4–11)

## 2025-07-08 PROCEDURE — 510N000029 HC RESEARCH B&I MEALS, PER MEAL

## 2025-07-08 PROCEDURE — 510N000025 HC RESEARCH B&I EARLY OR LATE SURCHARGE

## 2025-07-08 PROCEDURE — 250N000011 HC RX IP 250 OP 636: Performed by: STUDENT IN AN ORGANIZED HEALTH CARE EDUCATION/TRAINING PROGRAM

## 2025-07-08 PROCEDURE — 510N000009 HC RESEARCH FACILITY, PER 15 MIN

## 2025-07-08 PROCEDURE — 250N000013 HC RX MED GY IP 250 OP 250 PS 637: Performed by: STUDENT IN AN ORGANIZED HEALTH CARE EDUCATION/TRAINING PROGRAM

## 2025-07-08 PROCEDURE — 96365 THER/PROPH/DIAG IV INF INIT: CPT

## 2025-07-08 PROCEDURE — 86140 C-REACTIVE PROTEIN: CPT | Performed by: STUDENT IN AN ORGANIZED HEALTH CARE EDUCATION/TRAINING PROGRAM

## 2025-07-08 PROCEDURE — 36415 COLL VENOUS BLD VENIPUNCTURE: CPT | Performed by: STUDENT IN AN ORGANIZED HEALTH CARE EDUCATION/TRAINING PROGRAM

## 2025-07-08 PROCEDURE — 82248 BILIRUBIN DIRECT: CPT | Performed by: STUDENT IN AN ORGANIZED HEALTH CARE EDUCATION/TRAINING PROGRAM

## 2025-07-08 PROCEDURE — 258N000003 HC RX IP 258 OP 636: Performed by: STUDENT IN AN ORGANIZED HEALTH CARE EDUCATION/TRAINING PROGRAM

## 2025-07-08 PROCEDURE — 84153 ASSAY OF PSA TOTAL: CPT | Performed by: STUDENT IN AN ORGANIZED HEALTH CARE EDUCATION/TRAINING PROGRAM

## 2025-07-08 PROCEDURE — 36000 PLACE NEEDLE IN VEIN: CPT

## 2025-07-08 PROCEDURE — 80053 COMPREHEN METABOLIC PANEL: CPT | Performed by: STUDENT IN AN ORGANIZED HEALTH CARE EDUCATION/TRAINING PROGRAM

## 2025-07-08 PROCEDURE — 999N000129 HC STATISTIC PICC/MID LINE PROC CANCELLED SUBQ WORKUP

## 2025-07-08 PROCEDURE — 510N000023 HC CRU B&I PATIENT CARE, PER 15 MIN

## 2025-07-08 PROCEDURE — 300N000007 HC RESEARCH B&I SPECIMEN PROCESSING, SIMPLE

## 2025-07-08 PROCEDURE — 84550 ASSAY OF BLOOD/URIC ACID: CPT | Performed by: STUDENT IN AN ORGANIZED HEALTH CARE EDUCATION/TRAINING PROGRAM

## 2025-07-08 PROCEDURE — 85014 HEMATOCRIT: CPT | Performed by: STUDENT IN AN ORGANIZED HEALTH CARE EDUCATION/TRAINING PROGRAM

## 2025-07-08 PROCEDURE — 96361 HYDRATE IV INFUSION ADD-ON: CPT

## 2025-07-08 PROCEDURE — 250N000012 HC RX MED GY IP 250 OP 636 PS 637: Performed by: STUDENT IN AN ORGANIZED HEALTH CARE EDUCATION/TRAINING PROGRAM

## 2025-07-08 PROCEDURE — 82728 ASSAY OF FERRITIN: CPT | Performed by: STUDENT IN AN ORGANIZED HEALTH CARE EDUCATION/TRAINING PROGRAM

## 2025-07-08 RX ORDER — DEXAMETHASONE SODIUM PHOSPHATE 4 MG/ML
8 INJECTION, SOLUTION INTRA-ARTICULAR; INTRALESIONAL; INTRAMUSCULAR; INTRAVENOUS; SOFT TISSUE
Status: DISCONTINUED | OUTPATIENT
Start: 2025-07-08 | End: 2025-07-09 | Stop reason: HOSPADM

## 2025-07-08 RX ORDER — EPINEPHRINE 1 MG/ML
0.3 INJECTION, SOLUTION, CONCENTRATE INTRAVENOUS EVERY 5 MIN PRN
Status: DISCONTINUED | OUTPATIENT
Start: 2025-07-08 | End: 2025-07-09 | Stop reason: HOSPADM

## 2025-07-08 RX ORDER — ACETAMINOPHEN 500 MG
1000 TABLET ORAL EVERY 6 HOURS
Status: DISCONTINUED | OUTPATIENT
Start: 2025-07-08 | End: 2025-07-09 | Stop reason: HOSPADM

## 2025-07-08 RX ORDER — DEXAMETHASONE 4 MG/1
8 TABLET ORAL ONCE
Status: COMPLETED | OUTPATIENT
Start: 2025-07-08 | End: 2025-07-08

## 2025-07-08 RX ORDER — MEPERIDINE HYDROCHLORIDE 25 MG/ML
25 INJECTION INTRAMUSCULAR; INTRAVENOUS; SUBCUTANEOUS
Status: DISCONTINUED | OUTPATIENT
Start: 2025-07-08 | End: 2025-07-09 | Stop reason: HOSPADM

## 2025-07-08 RX ORDER — DIPHENHYDRAMINE HYDROCHLORIDE 50 MG/ML
25 INJECTION, SOLUTION INTRAMUSCULAR; INTRAVENOUS
Status: DISCONTINUED | OUTPATIENT
Start: 2025-07-08 | End: 2025-07-09 | Stop reason: HOSPADM

## 2025-07-08 RX ORDER — DIPHENHYDRAMINE HYDROCHLORIDE 50 MG/ML
50 INJECTION, SOLUTION INTRAMUSCULAR; INTRAVENOUS
Status: DISCONTINUED | OUTPATIENT
Start: 2025-07-08 | End: 2025-07-09 | Stop reason: HOSPADM

## 2025-07-08 RX ORDER — ALBUTEROL SULFATE 0.83 MG/ML
2.5 SOLUTION RESPIRATORY (INHALATION)
Status: DISCONTINUED | OUTPATIENT
Start: 2025-07-08 | End: 2025-07-09 | Stop reason: HOSPADM

## 2025-07-08 RX ORDER — HEPARIN SODIUM (PORCINE) LOCK FLUSH IV SOLN 100 UNIT/ML 100 UNIT/ML
5-10 SOLUTION INTRAVENOUS
Status: DISCONTINUED | OUTPATIENT
Start: 2025-07-08 | End: 2025-07-09 | Stop reason: HOSPADM

## 2025-07-08 RX ORDER — FAMOTIDINE 20 MG/1
20 TABLET, FILM COATED ORAL EVERY 12 HOURS PRN
Status: DISCONTINUED | OUTPATIENT
Start: 2025-07-08 | End: 2025-07-09 | Stop reason: HOSPADM

## 2025-07-08 RX ORDER — IBUPROFEN 200 MG
400 TABLET ORAL EVERY 8 HOURS PRN
Status: DISCONTINUED | OUTPATIENT
Start: 2025-07-08 | End: 2025-07-09 | Stop reason: HOSPADM

## 2025-07-08 RX ORDER — HEPARIN SODIUM (PORCINE) LOCK FLUSH IV SOLN 100 UNIT/ML 100 UNIT/ML
SOLUTION INTRAVENOUS
Status: COMPLETED
Start: 2025-07-08 | End: 2025-07-08

## 2025-07-08 RX ORDER — HEPARIN SODIUM,PORCINE 10 UNIT/ML
5-10 VIAL (ML) INTRAVENOUS
Status: DISCONTINUED | OUTPATIENT
Start: 2025-07-08 | End: 2025-07-09 | Stop reason: HOSPADM

## 2025-07-08 RX ORDER — HEPARIN SODIUM,PORCINE 10 UNIT/ML
5-10 VIAL (ML) INTRAVENOUS EVERY 24 HOURS
Status: DISCONTINUED | OUTPATIENT
Start: 2025-07-08 | End: 2025-07-09 | Stop reason: HOSPADM

## 2025-07-08 RX ORDER — ACETAMINOPHEN 500 MG
1000 TABLET ORAL EVERY 6 HOURS PRN
Status: DISCONTINUED | OUTPATIENT
Start: 2025-07-08 | End: 2025-07-09 | Stop reason: HOSPADM

## 2025-07-08 RX ORDER — ALBUTEROL SULFATE 90 UG/1
1-2 INHALANT RESPIRATORY (INHALATION)
Status: DISCONTINUED | OUTPATIENT
Start: 2025-07-08 | End: 2025-07-09 | Stop reason: HOSPADM

## 2025-07-08 RX ORDER — METHYLPREDNISOLONE SODIUM SUCCINATE 40 MG/ML
40 INJECTION INTRAMUSCULAR; INTRAVENOUS
Status: DISCONTINUED | OUTPATIENT
Start: 2025-07-08 | End: 2025-07-09 | Stop reason: HOSPADM

## 2025-07-08 RX ADMIN — DEXAMETHASONE 8 MG: 4 TABLET ORAL at 16:15

## 2025-07-08 RX ADMIN — SODIUM CHLORIDE 1000 ML: 0.9 INJECTION, SOLUTION INTRAVENOUS at 10:08

## 2025-07-08 RX ADMIN — HEPARIN SODIUM (PORCINE) LOCK FLUSH IV SOLN 100 UNIT/ML 5 ML: 100 SOLUTION at 16:53

## 2025-07-08 RX ADMIN — Medication 5 ML: at 16:53

## 2025-07-08 RX ADMIN — DEXAMETHASONE 8 MG: 4 TABLET ORAL at 10:05

## 2025-07-08 RX ADMIN — ACETAMINOPHEN 1000 MG: 500 TABLET ORAL at 10:07

## 2025-07-08 RX ADMIN — ACETAMINOPHEN 1000 MG: 500 TABLET ORAL at 16:15

## 2025-07-08 NOTE — PROGRESS NOTES
Clinical Research Unit Nursing Note:  Wenceslao Finch presents today for Cycle 1 Day 22, Study- Phase 3 AMG-509 for mCRPC (IDS #[id number]).     Patient seen by provider today: Yes: Gera                             present during visit today: Not Applicable.     Note:   Cycle 1 Day 22. Pre-infusion labs drawn, okay to proceed.Tolerated previous infusion without incident.    Intravenous Access:  Port accessed and PIV placed.    Pre-medications:  Dexamethasone PO 8 mg(home dose) taken @ 2030 per patient.  NS 1 L IV Started: @ 1008 over 4 hours  Acetaminophen PO 1000 mg @ 1010  Dexamethasone PO 8 mg @ 1010    Study - AMG-509 (IDS# 6454) intravenous infusion:   Chemo Double Check  Protocol verified?: Yes  Height and weight verified?: Yes  BSA confirmed?: N/A  Meets treatment parameters?: Yes  Initial RN verification: Karol Vieyra RN1  Second RN verification: Janae Randle RN    Dose: 1.5 mg  Infusion started at 1113. End of infusion (with 20 ml NS flush) ended at: 1218    Post Infusion Assessment:  Vitals and labs obtained per protocol. Observed for 6 hrs post infusion. Patient tolerated infusion without incident. Patient afebrile without signs of neurotoxicity.  Denies new symptoms. No new muscle pain, muscle weakness, joint pain, or soft tissue swelling.    Patient Vitals for the past 24 hrs:   BP Temp Temp src Pulse Resp SpO2 Weight   07/08/25 1640 (!) 151/89 -- -- -- -- -- --   07/08/25 1629 (!) 172/89 -- -- -- -- -- --   07/08/25 1618 (!) 160/83 97.2  F (36.2  C) Temporal 104 18 93 % --   07/08/25 1615 -- 97.2  F (36.2  C) -- -- -- -- --   07/08/25 1409 138/86 98.1  F (36.7  C) Temporal 92 18 96 % --   07/08/25 1219 (!) 154/92 97.5  F (36.4  C) Temporal 102 18 98 % --   07/08/25 1104 139/88 97.5  F (36.4  C) Temporal 96 18 97 % --   07/08/25 0858 (!) 140/92 97.2  F (36.2  C) Temporal 98 18 94 % 120.8 kg (266 lb 5.1 oz)       CRS Grading ~2 Hours Post EOI ~4-6 Hours Post EOI   Time of Assessment: 1410  "1620   Fever: </= 100.4 </= 100.4   Blood pressure: SBP >/= 90 (not hypotensive) SBP >/= 90 (not hypotensive)   Oxygen saturation: >/= 90% on room air (not hypoxic) >/= 90% on room air (not hypoxic)   Current CRS stgstrstastdstest:st st1st 0     ICE Score ~2 Hours Post EOI ~4-6 Hours Post EOI   Time of Assessment: 1410 1620   There are signs of headache, muscle spasms or rigidity, or vision changes. No. No.   Orientation: Orientation to year, month, city, hospital: 4 points (1 point each) 4 4   Identify: Name 3 objects that provider points to (e.g. clock, pen, button): 3 points (1 point each) 3 3   Following Commands: (e.g. show me two fingers or close your eyes and stick out your tongue): 1 point 1 1   Writing: Ability to write a standard sentence (see writing page \"Our national bird is the bald eagle.\"): 1 point 1 1   Attention: Count backwards from 100 by 10s: 1 point   1 1   Total: Max 10 10 10     Report given to Gordo KENNEDY RN @ 1500    Care taken over from Karol BOTELLO RN at 1500.    1634: Patient's Bps ranging from 160s-170s/80s after multiple reads/positions. Dr. Boss notified. Recheck at 1640 was 151/89. Dr. Boss came to the CRU to assess patient, gave the ok to discharge.    Post Infusion Medications:  Acetaminophen PO 1000 mg @ 1615  Dexamethasone PO 8 mg @ 1615    Discharge Plan:   Ok to discharge. Access discontinued per protocol.   Patient instructed to remain within 1 hour hospital proximity for 24 hours, stay hydrated, and take 1000 mg tylenol every 6 hours for 24 hours. Discharge AVS provided to patient.    Patient discharged in stable condition accompanied by: wife, Gloria Alvarado.  Departure Mode: Ambulatory.    Janae Randle RN on 7/8/2025 at 5:04 PM    Table 1: CRS Grading per ASTCT (https://[email or web address]/10.1016/j.bbmt.[email or web address])              "

## 2025-07-08 NOTE — PROGRESS NOTES
[facility] Medical Oncology Return Visit Note       Date of visit: 2025    CC: [id number], C1D22    INTERVAL HISTORY:   Burt presents today with Gloria Alvarado for TQT492 Phase 3 C1D22.  Feeling better today.  Over the weekend he developed a rash on his chest and back.  Treated with OTC hydrocortisone cream.  Rash is better and nearly resolved.  It is not itchy.  Appetite wasn't great over the weekend, but it is better today.  No nausea or vomiting.  One fever to 100.9F  evening, treated with tylenol and ibuprofen only.  No additional temperatures over 100.4F before or since.  Tongue was red but not painful over the weekend.  Taste was slightly changed at times, but not consistently.  No edema.  No cough.  No shortness of breath.  No nausea or vomiting.  Feels ready for next dose.      ONCOLOGY HISTORY: .   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  -23, PSA 9.8, switched to Abiraterone + prednisone 5 mg PO daily + ADT  -2023, PSA 13, PSMA (stable/bone avid mets) => Apalutamide declined by insurance  -2024, PSA 18, initiated C1 docetaxel Q21D + prednisone Smg BID + ADT  PSA is 21<PSA is 19<PSA is 14.9, PSA is 9.59 as of 2024.  -: completed C10D1 docetaxel Q21D + prednisone S5mg BID + ADT.  - 24: completed C11D1 docetaxel Q21D + prednisone 5mg BID + ADT + C1D64 Eligard.  PSA 9.55  - 2024: completed C12D1 docetaxel Q21D + prednisone Smg BID + ADT. PSA 11.00  - 24 PSMA PET showing disease progression. Consult to Rad Onc.  PSA 8.89.   - 10/23/24-PSA 18.40  - 24: Pluvicto started, given every 6 weeks. Managed by [facility] Rad Onc team.  - 24- PSA 35.00  - 25: Cycle 2 Pluvicto to be given later today. Pt has Rad Onc BAYLEE fi/u for following labs and PSA.  - 25- PSA 68.00  - 25: Proceed  Eligard shot today, along with Zometa.. Continues on darolutamide 600 mg BID.  - 4/1/25: Received cycle 4 Pluvicto. PSA is 84.90 on 4/9/2025.  Clear progression on PSMA PET from 4/8 with numerous new PSMA avid lesions.    - 5/5/25- Second opinion at [facility].  -5/19/25- [id number] Phase 3 consent signed.  Screening initiated.  Stop Darolutamide.  Reduce prednisone to 5mg daily x7 days and then 5mg every other day.    -6/17/25-C1D1 [id number] 0.1mg.  Tolerated without issues.   -6/24/25-C1D8 [id number] 0.3mg in CRU.    -7/1/25-C1D15 [id number] 1mg in CRU.  PSA 54.00 pre-infusion.     -7/8/25-C1D22 NHS804 1.5mg in CRU.  PSA 15.80 pre-infusion.      ALLERGIES: No known drug allergies    MEDS:  Current Outpatient Medications   Medication Sig Dispense Refill    acetaminophen (TYLENOL) 500 MG tablet Take 2 tablets (1,000 mg) by mouth every 6 hours. Take for the first 24 hours after discharge for all xaluritamig doses in Cycle 1. Maximum 4000 mg in 24 hours. 120 tablet 5    calcium carbonate 500 mg, elemental, (OSCAL 500) 1250 (500 Ca) MG TABS tablet Take 500 mg by mouth daily.      ciprofloxacin (CIPRO) 500 MG tablet Take 1 tablet (500 mg) by mouth 2 times daily as needed (Take only if instructed to do so by Dr. Boss or the [id number] team for fevers.). 14 tablet 0    dexAMETHasone (DECADRON) 4 MG tablet Take 2 tablets (8 mg) by mouth See Admin Instructions. Take 2 tablets (8 mg) the evening (approximately 12-16 hours) prior to the first 4 xaluritamig doses. Record time taken. 30 tablet 5    famotidine (PEPCID) 20 MG tablet Take 1 tablet (20 mg) by mouth 2 times daily as needed (Take when taking ibuprofen). 60 tablet 5    ibuprofen (ADVIL/MOTRIN) 200 MG tablet Take 3 tablets (600 mg) by mouth every 6 hours as needed for pain (muscle aches). 240 tablet 1    loratadine (CLARITIN) 10 MG tablet Take 10 mg by mouth daily.      methocarbamol (ROBAXIN) 750 MG tablet Take 1 tablet (750 mg) by mouth 3 times daily as needed for muscle spasms. 90 tablet 5     metroNIDAZOLE (FLAGYL) 500 MG tablet Take 1 tablet (500 mg) by mouth 3 times daily as needed (Take only if instructed to do so by Dr. Boss or the [facility] [place] for fevers.). 21 tablet 0    venlafaxine (EFFEXOR XR) 150 MG 24 hr capsule Take 150 mg by mouth every evening.      vitamin D3 (CHOLECALCIFEROL) 50 mcg (2000 units) tablet Take 1 tablet by mouth 2 times daily.      vitamin E (TOCOPHEROL) 400 units (180 mg) capsule Take 400 Units by mouth daily.       Current Facility-Administered Medications   Medication Dose Route Frequency Provider Last Rate Last Admin    acetaminophen (TYLENOL) tablet 1,000 mg  1,000 mg Oral Q6H Servando Boss MD   1,000 mg at 07/08/25 1007    acetaminophen (TYLENOL) tablet 1,000 mg  1,000 mg Oral Q6H PRN Servando Boss MD        albuterol (PROVENTIL HFA/VENTOLIN HFA) inhaler  1-2 puff Inhalation Once PRN Servando Boss MD        albuterol (PROVENTIL) neb solution 2.5 mg  2.5 mg Nebulization Once PRN Servando Boss MD        dexAMETHasone (DECADRON) injection 8 mg  8 mg Intravenous Once PRN Servando Boss MD        dexAMETHasone (DECADRON) tablet 8 mg  8 mg Oral Once Servando Boss MD        diphenhydrAMINE (BENADRYL) injection 25 mg  25 mg Intravenous Once PRN Servando Boss MD        diphenhydrAMINE (BENADRYL) injection 50 mg  50 mg Intravenous Once PRN Servando Boss MD        EPINEPHrine PF (ADRENALIN) injection 0.3 mg  0.3 mg Intramuscular Q5 Min PRN Servando Boss MD        famotidine (PEPCID) injection 20 mg  20 mg Intravenous Once PRN Servando Boss MD        famotidine (PEPCID) tablet 20 mg  20 mg Oral Q12H PRN Servando Boss MD        ibuprofen (ADVIL/MOTRIN) tablet 400 mg  400 mg Oral Q8H PRN Servando Boss MD        meperidine (DEMEROL) injection 25 mg  25 mg Intravenous Once PRN Servando Boss MD        methylPREDNISolone  sodium succinate (SOLU-MEDROL) injection 40 mg  40 mg Intravenous Once PRN Servando Boss MD        sodium chloride 0.9% BOLUS 1,000 mL  1,000 mL Intravenous Once Servando Boss  mL/hr at 07/08/25 1008 1,000 mL at 07/08/25 1008    sodium chloride 0.9% BOLUS 1,000 mL  1,000 mL Intravenous Once PRN Servando Boss MD        study - xaluritamig () (IDS# [id number]) 1.5 mg in sodium chloride 0.9% in non-PVC container 250 mL infusion  1.5 mg Intravenous Once Servando Boss  mL/hr at 07/08/25 1112 1.5 mg at 07/08/25 1112     ROS-Remainder of 14 point ROS reviewed and negative except as in HPI.    PHYSICAL EXAM:  ECOG-PS=1    Temp 98.1F, P 92, /81, SpO2 98%  Wt 119.9kg    Exam:  Constitutional: healthy, alert, and no distress  Head: Normocephalic. No masses, lesions grossly.  No oral ulcers or lesions.  Mild redness of tongue along midline.  No pharyngeal erythema.    Neck: Neck supple. No adenopathy.    Cardiovascular: No edema or JVD. RRR, no m/r/g appreciated.   Respiratory: normal WOB on RA, CTAB.    Gastrointestinal: negative, non-distended, non-tender, no masses or organomegaly noted   : Deferred  Musculoskeletal: extremities normal- no gross deformities noted, gait normal, and normal muscle tone  Skin: faint rash on lower abdomen and upper back involving bilateral flanks. Small patch above sternal notch.  No ulcerations or blisters.    Neurologic: CNII-XII grossly intact, normal speech   Psychiatric: mentation appears normal and affect normal/bright    Level of consciousness alert   Orientation {time, place, and person  Vision no vision changes  Cranial nerves and brain stem functions Normal   Pyramidal and extra pyramidal motor system reflexes Normal  Muscle tone and trophic findings Normal  Coordination Finger to nose test showed normal findings   Heel to shin test showed normal findings  Normal rapid alternating movements and no pronator drift  "  Sensory system decreased at baseline in R 5th finger sensation  neuropsychological findings (eg, speech, cognition, and emotion). Normal    Cellular Therapy Toxicity Monitoring Note  Date: 07/08/2025   Wenceslao Finch ([id number]) is a 70 year old year old male who is currently day  of CAR-T cell therapy or bispecific antibody therapy.    1) CRS Grading (see table below for grading schema)  Fever:   </= 100.4  Blood pressure:   SBP >/= 90 (not hypotensive)  Oxygen saturation:    >/= 90% on room air (not hypoxic)    Current CRS stgstrstastdstest:st st1st 2) ICANS Grading (see table below for grading schema)    Patient is alert & oriented x4. There are signs of headache, muscle spasms or rigidity, or vision changes. No.    ICANS score/Neurotoxicity assessment  Score:  4   Orientation: Orientation to year, month, city, hospital: 4 points (1 point each)  3   Identify: Name 3 objects that provider points to (e.g. clock, pen, button): 3 points (1 point each)  1   Following Commands: (e.g. show me two fingers or close your eyes and stick out your tongue): 1 point  1   Writing: Ability to write a standard sentence (see writing page \"Our national bird is the bald eagle.\"): 1 point  1   Attention: Count backwards from 100 by 10s: 1 point     10   Total: Max 10    ICE Score: 0  (10 = Grade 0; 7-9 = Grade 1; 3-6 = Grade 2; 0-2 = Grade 3; 0 = Grade 4)    Seizure   No seizures    Motor Findings   No motor findings    Elevated ICP/Cerebral Edema   None    Current ICANS stgstrstastdstest:st st1st Table 1: CRS Grading per ASTCT (https://doi.org/10.1016/j.bbmt.[email or web address])      Tabe 2: Neurotoxicity Grading per ASTCT (https://doi.org/10.1016/j.bbmt.[email or web address])        LABS AND IMAGES:    PSA trend see oncology history     Labs:   7/8/25:     CMP:   Na 132, CO2 20, albumin 3.3, glucose 208, uric acid 4.4     , Ferritin 801    WBC 13.8, ANC 13.1, Hgb 10.4, MCV 90, Plt 312     IMPRESSION AND PLAN:    # metastatic castration resistant prostate " whit [place] was referred for second opinion regarding potential clinical trials and was initially seen on May 5, 2025.  He was initially diagnosed with [place] 4+3 equal 7 prostate adenocarcinoma in 2016 underwent radical prostatectomy at that time.  PSA became detectable again in 2018 and he was initiated on ADT for 2 years total, which was then held.  PSA began to rise approximately 4 months later (November 2020) after holding ADT when his PSA was 7.  He was restarted on ADT with Dr. Caro in February 2021 and continued on ADT, notably his PSA began to rise significantly in January 2022 rising from 26.8-40.  He was started on enzalutamide in January 2022 for nonmetastatic prostate cancer that was hormone resistant.  His first metastasis appeared on nuclear medicine bone scan in 2023 in the left mid sternum, prompting switch to abiraterone 1000 mg daily along with prednisone.  His PSA continued to slowly rise through January 2024 when he was started on docetaxel in the CRPC setting.  Cycle 1 day 1 of docetaxel was January 17, 2024 when his PSA was 18 he was started on Pluvicto at [place] [facility] in November 2024 and completed 4 total cycles before restaging on April 8, 2025.  That PSMA PET scan was notable for numerous new PSMA avid lesions and a PSA of 84, demonstrating clear progression while on Pluvicto and after the typical expected timeframe for treatment related flare.    He signed consent for the NZC171 and was randomized to the study drug (xaluritamig) arm.  -C1D1 on 6/16/25 at 0.1mg and tolerated without issues.    -C1D8 on 6/24 at 0.3mg.  Muscle aches and sore throat ~4 days after infusion, resolved after 2 tylenol and ibuprofen.    -C1D15 on 7/1/25 at 1mg.  Fever overnight C1D20 to 100.9F.  No aches.  Rash developed on C1D19 and improved with OTC hydrocortisone.    -F2F16-4.5mg administered.  PSA with 95% reduction compared to pre-treatment baseline.  Continue OTC hydrocortisone cream at prior sites  of rash.     PLAN:   Please continue to take tylenol 1000mg every 6 hours for 24 hours AFTER discharge (4 doses in total).   2.   Once you are done with that 24 hours of required tylenol, I would decrease to 1000mg every 8 hours of tylenol and 600mg every 6 hours of ibuprofen even if you are feeling well.  This helps reduce the muscle aches and stiffness that you felt over the weekend after your 4th dose, particularly with your inflammation markers on the rise.    3. Continue to aggressively hydrate with 11 cups of water or non-caffeinated fluids daily.  4. Watch your blood pressure and temperature.  5. Let us know right away if you have a fever of 100.4F or higher, blood pressure with top number less than 100, or oxygen level less than 90%. During the day reach out to the study team. After hours and weekends, call me directly at [phone] regardless of the time of day.  7. If you take ibuprofen, you need to take this with famotidine to protect the lining of your stomach.  8. Let me know if you get worsening pain or symptoms around your fistula.  9. Continue hydrocortisone cream to areas where you had a rash over the weekend.  Let us know if you have any new sites of  rash.    10. Keep your home action kit with you if you leave home for more than 2-3 hours.  11. For routine updates (no fever, low blood pressure, or low oxygen) you can give us updates in MyChart.  12. Take dexamethasone (2x 4mg tablets) the evening prior to next week's dose as you have been doing so far.      A total of 90 minutes spent on the date of the encounter doing chart review, review of test results, interpretation of tests, patient visit, and documentation.  The patient was given the opportunity to ask multiple questions today, all of which were answered to their satisfaction.  Extended service time for patient management, monitoring and counseling. Pt seen and evaluated multiple times during CRU visit today.     The longitudinal plan of  care for mCRPC was addressed during this visit. Due to the added complexity in care, I will continue to support Wenceslao Finch in the subsequent management of this condition(s) and with the ongoing continuity of care of this condition(s).     Servando Boss MD, PhD   of Medicine   Oncology/BMT/Cellular Therapies

## 2025-07-08 NOTE — PATIENT INSTRUCTIONS
Burt,   Here is what we discussed today:     Please continue to take tylenol 1000mg every 6 hours for 24 hours AFTER discharge (4 doses in total).   2.   Once you are done with that 24 hours of required tylenol, I would decrease to 1000mg every 8 hours of tylenol and 600mg every 6 hours of ibuprofen even if you are feeling well.  This helps reduce the muscle aches and stiffness that you felt over the weekend after your 4th dose, particularly with your inflammation markers on the rise.    3. Continue to aggressively hydrate with 11 cups of water or non-caffeinated fluids daily.  4. Watch your blood pressure and temperature.  5. Let us know right away if you have a fever of 100.4F or higher, blood pressure with top number less than 100, or oxygen level less than 90%. During the day reach out to the study team. After hours and weekends, call me directly at [phone] regardless of the time of day.  7. If you take ibuprofen, you need to take this with famotidine to protect the lining of your stomach.  8. Let me know if you get worsening pain or symptoms around your fistula.  9. Continue hydrocortisone cream to areas where you had a rash over the weekend.  Let us know if you have any new sites of  rash.    10. Keep your home action kit with you if you leave home for more than 2-3 hours.  11. For routine updates (no fever, low blood pressure, or low oxygen) you can give us updates in MyChart.  12. Take dexamethasone (2x 4mg tablets) the evening prior to next week's dose as you have been doing so far.

## 2025-07-09 ENCOUNTER — ALLIED HEALTH/NURSE VISIT (OUTPATIENT)
Dept: ONCOLOGY | Facility: CLINIC | Age: 70
End: 2025-07-09
Payer: COMMERCIAL

## 2025-07-09 DIAGNOSIS — C61 PROSTATE CANCER METASTATIC TO BONE (H): Primary | ICD-10-CM

## 2025-07-09 DIAGNOSIS — C79.51 PROSTATE CANCER METASTATIC TO BONE (H): Primary | ICD-10-CM

## 2025-07-09 NOTE — ADDENDUM NOTE
Encounter addended by: Angela Agudelo on: 7/9/2025 6:34 AM   Actions taken: Charge Capture section accepted

## 2025-07-09 NOTE — PROGRESS NOTES
[id number]: Study Visit Note   Subject name: Wenceslao Finch     Visit: C1D23    Did the study visit occur within the appropriate window allowed by the protocol? yes    Since the last study visit, He has been doing well. No s/s today following target dose infusion yesterday. Reviewed plan for ongoing CRS assessment and management per Dr Boss. Pt and spouse will reach out with any onset of symptoms that may occur to inform PI and stay coordinated with symptom management. Pt scheduled to start cycle 3 next Monday 7/14.      I have personally interviewed Wenceslao Finch and reviewed his medical record for adverse events and concomitant medications and these have been recorded on the corresponding logs in Wenceslao Finch's research file.     Wenceslao Finch was given the opportunity to ask any trial related questions.  Please see provider progress note for physical exam and other clinical information. Labs were reviewed - any significant lab values were addressed and reviewed.    Liat Galindo RN

## 2025-07-09 NOTE — ADDENDUM NOTE
Encounter addended by: Angela Agudelo on: 7/9/2025 6:31 AM   Actions taken: Charge Capture section accepted

## 2025-07-09 NOTE — PROGRESS NOTES
[id number] 3 for [facility]: Study Visit Note   Subject name: Wenceslao Finch      Visit: Cycle 1 Day 22     Did the study visit occur within the appropriate window allowed by the protocol? yes     Since the last study visit, He has been doing well. Wenceslao denied any rashes, chills, muscle aches or pains. The patient did report an a fever of 100.9 on late Saturday night/early Sunday morning he managed this with tylenol. The patient did also experience pretty severe fatigue on that Sunday 7/6/25, he ate breakfast then slept the remainder of the day. Patient reported a rash that was red in appearance, no papules, no itch; rash mostly on stomach/sides, small patches on back, neck, and underarms. Today rash is lighter in appearance on sides of torso and neck(front).  Patient also started ibuprofen and methocarbamol as a precaution.   The patient had no other symptoms and no changes to any medications.      No local labs were required today per protocol. However, additional labs collected per investigators preference for patient safety monitoring.      ePROs questionairres completed at this visit via tablet.      I have personally interviewed Wenceslao Finch and reviewed his medical record for adverse events and concomitant medications and these have been recorded on the corresponding logs in Wenceslao Finch's research file.      Special considerations for today's visit were reviewed with staff administering the study intervention including: Instructed RN to take VS within 30 minutes of the start of the infusion and at the end of the infusion. RN was also instructed to take research kit within 30 minutes prior to the infusion and at the end of the infusion. Infuse IP over 60 mins +/- 10 mins. 20 mL flush per institutional standard not to exceed 30 mins.      Wenceslao Finch was given the opportunity to ask any trial related questions.  Please see provider progress note for physical exam and other clinical  information. Labs were reviewed - any significant lab values were addressed and reviewed.     Yolis Pittman   Clinical Research Coordinator   [facility]  [facility]/ [email or web address]  [phone]/ [phone]

## 2025-07-13 ENCOUNTER — TELEPHONE (OUTPATIENT)
Dept: ONCOLOGY | Facility: CLINIC | Age: 70
End: 2025-07-13
Payer: COMMERCIAL

## 2025-07-13 RX ORDER — METHYLPREDNISOLONE SODIUM SUCCINATE 40 MG/ML
40 INJECTION INTRAMUSCULAR; INTRAVENOUS
Status: CANCELLED
Start: 2025-07-14

## 2025-07-13 RX ORDER — DIPHENHYDRAMINE HYDROCHLORIDE 50 MG/ML
25 INJECTION, SOLUTION INTRAMUSCULAR; INTRAVENOUS
Status: CANCELLED
Start: 2025-07-14

## 2025-07-13 RX ORDER — DEXAMETHASONE 4 MG/1
8 TABLET ORAL ONCE
Status: CANCELLED
Start: 2025-07-14 | End: 2025-07-14

## 2025-07-13 RX ORDER — DIPHENHYDRAMINE HYDROCHLORIDE 50 MG/ML
50 INJECTION, SOLUTION INTRAMUSCULAR; INTRAVENOUS
Status: CANCELLED
Start: 2025-07-14

## 2025-07-13 RX ORDER — ALBUTEROL SULFATE 90 UG/1
1-2 INHALANT RESPIRATORY (INHALATION)
Status: CANCELLED
Start: 2025-07-14

## 2025-07-13 RX ORDER — ACETAMINOPHEN 500 MG
1000 TABLET ORAL EVERY 6 HOURS PRN
Status: CANCELLED | OUTPATIENT
Start: 2025-07-14

## 2025-07-13 RX ORDER — ALBUTEROL SULFATE 0.83 MG/ML
2.5 SOLUTION RESPIRATORY (INHALATION)
Status: CANCELLED | OUTPATIENT
Start: 2025-07-14

## 2025-07-13 RX ORDER — FAMOTIDINE 20 MG/1
20 TABLET, FILM COATED ORAL EVERY 12 HOURS PRN
Status: CANCELLED | OUTPATIENT
Start: 2025-07-14

## 2025-07-13 RX ORDER — ACETAMINOPHEN 325 MG/1
650 TABLET ORAL ONCE
Status: CANCELLED
Start: 2025-07-14 | End: 2025-07-14

## 2025-07-13 RX ORDER — HEPARIN SODIUM (PORCINE) LOCK FLUSH IV SOLN 100 UNIT/ML 100 UNIT/ML
5 SOLUTION INTRAVENOUS
Status: CANCELLED | OUTPATIENT
Start: 2025-07-14

## 2025-07-13 RX ORDER — DEXAMETHASONE SODIUM PHOSPHATE 10 MG/ML
8 INJECTION, SOLUTION INTRAMUSCULAR; INTRAVENOUS
Status: CANCELLED | OUTPATIENT
Start: 2025-07-14

## 2025-07-13 RX ORDER — HEPARIN SODIUM,PORCINE 10 UNIT/ML
5-20 VIAL (ML) INTRAVENOUS DAILY PRN
Status: CANCELLED | OUTPATIENT
Start: 2025-07-14

## 2025-07-13 RX ORDER — EPINEPHRINE 1 MG/ML
0.3 INJECTION, SOLUTION INTRAMUSCULAR; SUBCUTANEOUS EVERY 5 MIN PRN
Status: CANCELLED | OUTPATIENT
Start: 2025-07-14

## 2025-07-13 RX ORDER — IBUPROFEN 200 MG
400 TABLET ORAL EVERY 8 HOURS PRN
Status: CANCELLED | OUTPATIENT
Start: 2025-07-14

## 2025-07-13 RX ORDER — MEPERIDINE HYDROCHLORIDE 25 MG/ML
25 INJECTION INTRAMUSCULAR; INTRAVENOUS; SUBCUTANEOUS
Status: CANCELLED | OUTPATIENT
Start: 2025-07-14

## 2025-07-13 RX ORDER — LORAZEPAM 2 MG/ML
0.5 INJECTION INTRAMUSCULAR EVERY 4 HOURS PRN
Status: CANCELLED | OUTPATIENT
Start: 2025-07-14

## 2025-07-13 NOTE — TELEPHONE ENCOUNTER
Called to check-in.     Feeling well.      No fevers. Fatigue much better.     Shoulders are a little sore, difficult raising arms over head.  Front of right leg is intermittently bothersome.      Pain is managable and was better with icy hot on Sunday morning. No limitations in what he is able to do.      Reviewed protocol. C2 premedications are at investigator discretion.     Will reduce overnight dose to 4mg (discussed with Burt by phone), day of premedication will remain at 8mg, and no post-medications.     Plan updated and signed in [place].

## 2025-07-14 ENCOUNTER — LAB (OUTPATIENT)
Dept: LAB | Facility: CLINIC | Age: 70
End: 2025-07-14
Attending: STUDENT IN AN ORGANIZED HEALTH CARE EDUCATION/TRAINING PROGRAM
Payer: COMMERCIAL

## 2025-07-14 ENCOUNTER — ONCOLOGY VISIT (OUTPATIENT)
Dept: ONCOLOGY | Facility: CLINIC | Age: 70
End: 2025-07-14
Attending: STUDENT IN AN ORGANIZED HEALTH CARE EDUCATION/TRAINING PROGRAM
Payer: COMMERCIAL

## 2025-07-14 ENCOUNTER — ALLIED HEALTH/NURSE VISIT (OUTPATIENT)
Dept: ONCOLOGY | Facility: CLINIC | Age: 70
End: 2025-07-14

## 2025-07-14 VITALS
RESPIRATION RATE: 18 BRPM | WEIGHT: 268.9 LBS | HEART RATE: 111 BPM | TEMPERATURE: 97.8 F | OXYGEN SATURATION: 96 % | DIASTOLIC BLOOD PRESSURE: 93 MMHG | BODY MASS INDEX: 37.03 KG/M2 | SYSTOLIC BLOOD PRESSURE: 143 MMHG

## 2025-07-14 DIAGNOSIS — C61 HORMONE RESISTANT PROSTATE CANCER (H): Primary | ICD-10-CM

## 2025-07-14 DIAGNOSIS — Z19.2 METASTATIC CASTRATION-RESISTANT ADENOCARCINOMA OF PROSTATE (H): Primary | ICD-10-CM

## 2025-07-14 DIAGNOSIS — Z19.2 HORMONE RESISTANT PROSTATE CANCER (H): Primary | ICD-10-CM

## 2025-07-14 DIAGNOSIS — C61 METASTATIC CASTRATION-RESISTANT ADENOCARCINOMA OF PROSTATE (H): Primary | ICD-10-CM

## 2025-07-14 LAB
ALBUMIN SERPL BCG-MCNC: 3.4 G/DL (ref 3.5–5.2)
ALP SERPL-CCNC: 67 U/L (ref 40–150)
ALT SERPL W P-5'-P-CCNC: 17 U/L (ref 0–70)
ANION GAP SERPL CALCULATED.3IONS-SCNC: 11 MMOL/L (ref 7–15)
AST SERPL W P-5'-P-CCNC: 19 U/L (ref 0–45)
BASOPHILS # BLD AUTO: 0.1 10E3/UL (ref 0–0.2)
BASOPHILS NFR BLD AUTO: 1 %
BILIRUB SERPL-MCNC: <0.2 MG/DL
BUN SERPL-MCNC: 19.9 MG/DL (ref 8–23)
CALCIUM SERPL-MCNC: 9.3 MG/DL (ref 8.8–10.4)
CHLORIDE SERPL-SCNC: 100 MMOL/L (ref 98–107)
CK SERPL-CCNC: 66 U/L (ref 39–308)
CREAT SERPL-MCNC: 0.89 MG/DL (ref 0.67–1.17)
CRP SERPL-MCNC: 155 MG/L
EGFRCR SERPLBLD CKD-EPI 2021: >90 ML/MIN/1.73M2
EOSINOPHIL # BLD AUTO: 0 10E3/UL (ref 0–0.7)
EOSINOPHIL NFR BLD AUTO: 0 %
ERYTHROCYTE [DISTWIDTH] IN BLOOD BY AUTOMATED COUNT: 13.9 % (ref 10–15)
FERRITIN SERPL-MCNC: 786 NG/ML (ref 31–409)
GLUCOSE SERPL-MCNC: 144 MG/DL (ref 70–99)
HCO3 SERPL-SCNC: 22 MMOL/L (ref 22–29)
HCT VFR BLD AUTO: 30.3 % (ref 40–53)
HGB BLD-MCNC: 9.8 G/DL (ref 13.3–17.7)
IMM GRANULOCYTES # BLD: 0.7 10E3/UL
IMM GRANULOCYTES NFR BLD: 4 %
LDH SERPL L TO P-CCNC: 185 U/L (ref 0–250)
LYMPHOCYTES # BLD AUTO: 0.6 10E3/UL (ref 0.8–5.3)
LYMPHOCYTES NFR BLD AUTO: 4 %
MAGNESIUM SERPL-MCNC: 2.3 MG/DL (ref 1.7–2.3)
MCH RBC QN AUTO: 29.8 PG (ref 26.5–33)
MCHC RBC AUTO-ENTMCNC: 32.3 G/DL (ref 31.5–36.5)
MCV RBC AUTO: 92 FL (ref 78–100)
MONOCYTES # BLD AUTO: 0.3 10E3/UL (ref 0–1.3)
MONOCYTES NFR BLD AUTO: 2 %
NEUTROPHILS # BLD AUTO: 13.7 10E3/UL (ref 1.6–8.3)
NEUTROPHILS NFR BLD AUTO: 89 %
NRBC # BLD AUTO: 0 10E3/UL
NRBC BLD AUTO-RTO: 0 /100
PHOSPHATE SERPL-MCNC: 3.5 MG/DL (ref 2.5–4.5)
PLATELET # BLD AUTO: 398 10E3/UL (ref 150–450)
POTASSIUM SERPL-SCNC: 4.6 MMOL/L (ref 3.4–5.3)
PROT SERPL-MCNC: 6.8 G/DL (ref 6.4–8.3)
PSA SERPL DL<=0.01 NG/ML-MCNC: 8.37 NG/ML (ref 0–4.5)
RBC # BLD AUTO: 3.29 10E6/UL (ref 4.4–5.9)
RETICS # AUTO: 0.06 10E6/UL (ref 0.03–0.1)
RETICS/RBC NFR AUTO: 1.9 % (ref 0.5–2)
SODIUM SERPL-SCNC: 133 MMOL/L (ref 135–145)
URATE SERPL-MCNC: 4.3 MG/DL (ref 3.4–7)
WBC # BLD AUTO: 15.3 10E3/UL (ref 4–11)

## 2025-07-14 PROCEDURE — 84550 ASSAY OF BLOOD/URIC ACID: CPT

## 2025-07-14 PROCEDURE — 84155 ASSAY OF PROTEIN SERUM: CPT

## 2025-07-14 PROCEDURE — 83615 LACTATE (LD) (LDH) ENZYME: CPT

## 2025-07-14 PROCEDURE — 83735 ASSAY OF MAGNESIUM: CPT

## 2025-07-14 PROCEDURE — 36591 DRAW BLOOD OFF VENOUS DEVICE: CPT

## 2025-07-14 PROCEDURE — 86140 C-REACTIVE PROTEIN: CPT

## 2025-07-14 PROCEDURE — 250N000013 HC RX MED GY IP 250 OP 250 PS 637: Performed by: STUDENT IN AN ORGANIZED HEALTH CARE EDUCATION/TRAINING PROGRAM

## 2025-07-14 PROCEDURE — 250N000011 HC RX IP 250 OP 636

## 2025-07-14 PROCEDURE — 85004 AUTOMATED DIFF WBC COUNT: CPT

## 2025-07-14 PROCEDURE — 85045 AUTOMATED RETICULOCYTE COUNT: CPT

## 2025-07-14 PROCEDURE — 82550 ASSAY OF CK (CPK): CPT

## 2025-07-14 PROCEDURE — 84153 ASSAY OF PSA TOTAL: CPT

## 2025-07-14 PROCEDURE — 250N000012 HC RX MED GY IP 250 OP 636 PS 637: Performed by: STUDENT IN AN ORGANIZED HEALTH CARE EDUCATION/TRAINING PROGRAM

## 2025-07-14 PROCEDURE — 250N000011 HC RX IP 250 OP 636: Performed by: STUDENT IN AN ORGANIZED HEALTH CARE EDUCATION/TRAINING PROGRAM

## 2025-07-14 PROCEDURE — G0463 HOSPITAL OUTPT CLINIC VISIT: HCPCS

## 2025-07-14 PROCEDURE — 84100 ASSAY OF PHOSPHORUS: CPT

## 2025-07-14 PROCEDURE — 82728 ASSAY OF FERRITIN: CPT

## 2025-07-14 RX ORDER — HEPARIN SODIUM (PORCINE) LOCK FLUSH IV SOLN 100 UNIT/ML 100 UNIT/ML
5 SOLUTION INTRAVENOUS ONCE
Status: COMPLETED | OUTPATIENT
Start: 2025-07-14 | End: 2025-07-14

## 2025-07-14 RX ORDER — HEPARIN SODIUM (PORCINE) LOCK FLUSH IV SOLN 100 UNIT/ML 100 UNIT/ML
5 SOLUTION INTRAVENOUS
Status: DISCONTINUED | OUTPATIENT
Start: 2025-07-14 | End: 2025-07-14 | Stop reason: HOSPADM

## 2025-07-14 RX ORDER — ACETAMINOPHEN 325 MG/1
650 TABLET ORAL ONCE
Status: COMPLETED | OUTPATIENT
Start: 2025-07-14 | End: 2025-07-14

## 2025-07-14 RX ORDER — DEXAMETHASONE 4 MG/1
8 TABLET ORAL ONCE
Status: COMPLETED | OUTPATIENT
Start: 2025-07-14 | End: 2025-07-14

## 2025-07-14 RX ADMIN — Medication 5 ML: at 12:44

## 2025-07-14 RX ADMIN — DEXAMETHASONE 8 MG: 4 TABLET ORAL at 14:35

## 2025-07-14 RX ADMIN — ACETAMINOPHEN 650 MG: 325 TABLET ORAL at 14:35

## 2025-07-14 RX ADMIN — Medication 5 ML: at 16:15

## 2025-07-14 ASSESSMENT — PAIN SCALES - GENERAL: PAINLEVEL_OUTOF10: NO PAIN (0)

## 2025-07-14 NOTE — PROGRESS NOTES
Infusion Nursing Note:  Wenceslao Finch presents today for D1, C2 Study-Xaluritamig (IDS# [id number]).    Patient seen by provider today: Yes: Viridiana Nelson CNP   present during visit today: Not Applicable.    Note: Wenceslao presents to infusion today following their clinic visit. Pt denies any new concerns and wishes to proceed with treatment. This is Wenceslao's first time to our infusion suite since he received the first 4 infusions inpatient or at the CRS unit. He denies any issues or allergic reactions to his first cycle, he said it went really well except for some temperature raises but they were able to get this under control with tylenol and it was expected.       Per research staff YAMILA Man to proceed with study treatment today.     The following items were completed per protocol as directed by research study staff:     - Vital signs taken prior to infusion within 30 minutes and post infusion within 5 minutes.   - Pre-medications, tylenol & benadryl, given within 1 hour prior to study drug.   - Research kit labs were taken within 30 minutes prior to infusion and post infusion. Labs prior and post infusion were both taken via port. PIV was placed for infusion. Patient expressed this is how they had done it before and would prefer to keep it the same for consistency.       IDS #[id number]   Start Time: 1501                  Stop Time: 1601   Stop time with flush: 1606    Per research staff patient is to take the 1000 mg of Tylenol q 6 hours for the first 24 hours - Then go back to the schedule of alternating ibuprofen and tylenol that Dr Boss started with them ongoing until his next infusion. Patient is to follow up with Dr. Boss with any questions or concerns.       Intravenous Access:  Implanted Port.    PIV placed by this RN.     Treatment Conditions:  Lab Results   Component Value Date    HGB 9.8 (L) 07/14/2025    WBC 15.3 (H) 07/14/2025    ANEU 13.7 (H) 07/14/2025     07/14/2025        Lab  Results   Component Value Date     (L) 07/14/2025    POTASSIUM 4.6 07/14/2025    MAG 2.3 07/14/2025    CR 0.89 07/14/2025    EARL 9.3 07/14/2025    BILITOTAL <0.2 07/14/2025    ALBUMIN 3.4 (L) 07/14/2025    ALT 17 07/14/2025    AST 19 07/14/2025       Results reviewed, labs MET treatment parameters, ok to proceed with treatment.      Post Infusion Assessment:  Patient tolerated infusion without incident.  Blood return noted pre and post infusion.  Site patent and intact, free from redness, edema or discomfort.  No evidence of extravasations.  Access discontinued per protocol.       Discharge Plan:   Prescription refills given for Ibuprofen.  Discharge instructions reviewed with: Patient and Family.  Patient and/or family verbalized understanding of discharge instructions and all questions answered.  Copy of AVS reviewed with patient and/or family.  Patient will return 7/28 for next appointment.  Patient discharged in stable condition accompanied by: self and wife.  Departure Mode: Ambulatory.      Octaviano Singh RN

## 2025-07-14 NOTE — PROGRESS NOTES
[facility] Medical Oncology Return Visit Note       Date of visit: 2025       CC: [id number], C1D22     INTERVAL HISTORY:     -felt great this week  -stayed on proactive tylenol every 8, ibuprofen every 6, famotidine BID   -highest temp was 100.0  -had trouble raising arms over his head over the last couple of days, icy hot to left shoulder and right front thigh yesterday which resolved it  -energy is good, going back to work this week one day, better than the week prior  -         ONCOLOGY HISTORY: .   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  -23, PSA 9.8, switched to Abiraterone + prednisone 5 mg PO daily + ADT  -2023, PSA 13, PSMA (stable/bone avid mets) => Apalutamide declined by insurance  -2024, PSA 18, initiated C1 docetaxel Q21D + prednisone Smg BID + ADT  PSA is 21<PSA is 19<PSA is 14.9, PSA is 9.59 as of 2024.  -: completed C10D1 docetaxel Q21D + prednisone S5mg BID + ADT.  - 24: completed C11D1 docetaxel Q21D + prednisone 5mg BID + ADT + C1D64 Eligard.  PSA 9.55  - 2024: completed C12D1 docetaxel Q21D + prednisone Smg BID + ADT. PSA 11.00  - 24 PSMA PET showing disease progression. Consult to Rad Onc.  PSA 8.89.   - 10/23/24-PSA 18.40  - 24: Pluvicto started, given every 6 weeks. Managed by [facility] Rad Onc team.  - 24- PSA 35.00  - 25: Cycle 2 Pluvicto to be given later today. Pt has Rad Onc BAYLEE fi/u for following labs and PSA.  - 25- PSA 68.00  - 25: Proceed Eligard shot today, along with Zometa.. Continues on darolutamide 600 mg BID.  - 25: Received cycle 4 Pluvicto. PSA is 84.90 on 2025.  Clear progression on PSMA PET from  with numerous new PSMA avid lesions.    - 25- Second opinion at [facility]  -25- [id number] Phase 3 consent signed.  Screening  initiated.  Stop Darolutamide.  Reduce prednisone to 5mg daily x7 days and then 5mg every other day.    -6/17/25-C1D1 [id number] 0.1mg.  Tolerated without issues.   -6/24/25-C1D8 [id number] 0.3mg in CRU.    -7/1/25-C1D15 [id number] 1mg in CRU.  PSA 54.00 pre-infusion.     -7/8/25-C1D22 [id number] 1.5mg in CRU.  PSA 15.80 pre-infusion.    -7/14/25- C2D1 [id number] 1.5mg in [facility], PSA 8.37 pre-infusion       ALLERGIES: No known drug allergies     MEDS:  Current Outpatient Medications   Medication Sig Dispense Refill    acetaminophen (TYLENOL) 500 MG tablet Take 2 tablets (1,000 mg) by mouth every 6 hours. Take for the first 24 hours after discharge for all xaluritamig doses in Cycle 1. Maximum 4000 mg in 24 hours. 120 tablet 5    calcium carbonate 500 mg, elemental, (OSCAL 500) 1250 (500 Ca) MG TABS tablet Take 500 mg by mouth daily.      ciprofloxacin (CIPRO) 500 MG tablet Take 1 tablet (500 mg) by mouth 2 times daily as needed (Take only if instructed to do so by Dr. Boss or the [id number] team for fevers.). 14 tablet 0    dexAMETHasone (DECADRON) 4 MG tablet Take 2 tablets (8 mg) by mouth See Admin Instructions. Take 2 tablets (8 mg) the evening (approximately 12-16 hours) prior to the first 4 xaluritamig doses. Record time taken. 30 tablet 5    famotidine (PEPCID) 20 MG tablet Take 1 tablet (20 mg) by mouth 2 times daily as needed (Take when taking ibuprofen). 60 tablet 5    ibuprofen (ADVIL/MOTRIN) 200 MG tablet Take 3 tablets (600 mg) by mouth every 6 hours as needed for pain (muscle aches). 240 tablet 1    loratadine (CLARITIN) 10 MG tablet Take 10 mg by mouth daily.      methocarbamol (ROBAXIN) 750 MG tablet Take 1 tablet (750 mg) by mouth 3 times daily as needed for muscle spasms. 90 tablet 5    metroNIDAZOLE (FLAGYL) 500 MG tablet Take 1 tablet (500 mg) by mouth 3 times daily as needed (Take only if instructed to do so by Dr. Boss or the [facility] team for fevers.). 21 tablet 0    venlafaxine (EFFEXOR XR) 150 MG 24  hr capsule Take 150 mg by mouth every evening.      vitamin D3 (CHOLECALCIFEROL) 50 mcg (2000 units) tablet Take 1 tablet by mouth 2 times daily.      vitamin E (TOCOPHEROL) 400 units (180 mg) capsule Take 400 Units by mouth daily.       No current facility-administered medications for this visit.             PHYSICAL EXAM:    ECOG-PS=1     General: The patient is a pleasant male in no acute distress.  BP (!) 143/93   Pulse 111   Temp 97.8  F (36.6  C) (Oral)   Resp 18   Wt 122 kg (268 lb 14.4 oz)   SpO2 96%   BMI 37.03 kg/m    Wt Readings from Last 10 Encounters:   07/14/25 122 kg (268 lb 14.4 oz)   07/08/25 120.8 kg (266 lb 5.1 oz)   07/01/25 119.9 kg (264 lb 5.3 oz)   06/24/25 121.7 kg (268 lb 4.8 oz)   06/17/25 124.1 kg (273 lb 11.2 oz)   05/19/25 120.4 kg (265 lb 8 oz)   05/05/25 121.1 kg (267 lb)   HEENT: EOMI. Sclerae are anicteric. Oral mucosa pink and moist without lesions or thrush.   Lymph: Neck is supple with no lymphadenopathy in the cervical or supraclavicular areas.   Heart: Regular rate and rhythm.   Lungs: Clear to auscultation bilaterally.   Abdomen: Bowel sounds present, soft, nontender with no palpable hepatosplenomegaly or masses.   Extremities: No lower extremity edema noted bilaterally.   Neuro: Cranial nerves II through XII are grossly intact. Able to get on and off of the exam table without difficulty.  Skin: No rashes, petechiae, or bruising noted on exposed skin.       Level of consciousness alert   Orientation {time, place, and person  Vision no vision changes  Cranial nerves and brain stem functions Normal   Pyramidal and extra pyramidal motor system reflexes Normal  Muscle tone and trophic findings Normal  Coordination Finger to nose test showed normal findings   Heel to shin test showed normal findings  Normal rapid alternating movements and no pronator drift   Sensory system decreased at baseline in R 5th finger sensation  neuropsychological findings (eg, speech, cognition, and  "emotion). Normal     Cellular Therapy Toxicity Monitoring Note  Date: 07/08/2025   Wenceslao Finch ([id number]) is a 70 year old year old male who is currently day  of CAR-T cell therapy or bispecific antibody therapy.     1) CRS Grading (see table below for grading schema)  Fever:              </= 100.4  Blood pressure:              SBP >/= 90 (not hypotensive)  Oxygen saturation:               >/= 90% on room air (not hypoxic)     Current CRS stgstrstastdstest:st st1st 2) ICANS Grading (see table below for grading schema)     Patient is alert & oriented x4. There are signs of headache, muscle spasms or rigidity, or vision changes. No.     ICANS score/Neurotoxicity assessment  Score:  4   Orientation: Orientation to year, month, city, hospital: 4 points (1 point each)  3   Identify: Name 3 objects that provider points to (e.g. clock, pen, button): 3 points (1 point each)  1   Following Commands: (e.g. show me two fingers or close your eyes and stick out your tongue): 1 point  1   Writing: Ability to write a standard sentence (see writing page \"Our national bird is the bald eagle.\"): 1 point  1   Attention: Count backwards from 100 by 10s: 1 point     10   Total: Max 10     ICE Score: 0  (10 = Grade 0; 7-9 = Grade 1; 3-6 = Grade 2; 0-2 = Grade 3; 0 = Grade 4)     Seizure              No seizures     Motor Findings              No motor findings     Elevated ICP/Cerebral Edema              None     Current ICANS stgstrstastdstest:st st1st Table 1: CRS Grading per ASTCT (https://doi.org/10.1016/j.bbmt.[email or web address])       Tabe 2: Neurotoxicity Grading per ASTCT (https://doi.org/10.1016/j.bbmt.[email or web address])          Labs and Imaging:       Most Recent 3 CBC's:  Recent Labs   Lab Test 07/14/25  1239 07/08/25  0853 07/01/25  1017   WBC 15.3* 13.8* 12.4*   HGB 9.8* 10.4* 11.4*   MCV 92 90 93    312 327   ANEU 13.7* 13.1* 11.4*     Most Recent 3 BMP's:  Recent Labs   Lab Test 07/14/25  1239 07/08/25  0853 07/01/25  1017   * 132* " 133*   POTASSIUM 4.6 4.4 4.4   CHLORIDE 100 98 99   CO2 22 20* 20*   BUN 19.9 17.8 21.3   CR 0.89 1.09 1.12   ANIONGAP 11 14 14   EARL 9.3 9.5 9.4   * 208* 200*   PROTTOTAL 6.8 7.1 7.0   ALBUMIN 3.4* 3.3* 3.6    Most Recent 3 LFT's:  Recent Labs   Lab Test 07/14/25  1239 07/08/25  0853 07/01/25  1017   AST 19 17 17   ALT 17 15 12   ALKPHOS 67 59 54   BILITOTAL <0.2 0.2 0.2    Most Recent 2 TSH and T4:No lab results found.     Latest Reference Range & Units 06/16/25 10:55 06/17/25 04:47 06/24/25 09:15 07/01/25 10:17 07/08/25 08:53 07/14/25 12:39   CRP Inflammation <5.00 mg/L 8.81 (H)  13.10 (H) 59.10 (H) 329.00 (H) 155.00 (H)   Ferritin 31 - 409 ng/mL 272 299 355 438 (H) 801 (H) 786 (H)   Glucose 70 - 99 mg/dL 165 (H) 130 (H) 143 (H) 200 (H) 208 (H) 144 (H)       PSA Tumor Marker   Date Value Ref Range Status   07/08/2025 15.80 (H) 0.00 - 4.50 ng/mL Final   07/01/2025 54.00 (H) 0.00 - 4.50 ng/mL Final   06/16/2025 305.00 (H) 0.00 - 4.50 ng/mL Final       I reviewed the above labs today.               IMPRESSION AND PLAN:     # metastatic castration resistant prostate cancer     Burt was referred for second opinion regarding potential clinical trials and was initially seen on May 5, 2025.  He was initially diagnosed with [place] 4+3 equal 7 prostate adenocarcinoma in 2016 underwent radical prostatectomy at that time.  PSA became detectable again in 2018 and he was initiated on ADT for 2 years total, which was then held.  PSA began to rise approximately 4 months later (November 2020) after holding ADT when his PSA was 7.  He was restarted on ADT with Dr. Caro in February 2021 and continued on ADT, notably his PSA began to rise significantly in January 2022 rising from 26.8-40.  He was started on enzalutamide in January 2022 for nonmetastatic prostate cancer that was hormone resistant.  His first metastasis appeared on nuclear medicine bone scan in 2023 in the left mid sternum, prompting switch to abiraterone 1000  mg daily along with prednisone.  His PSA continued to slowly rise through January 2024 when he was started on docetaxel in the CRPC setting.  Cycle 1 day 1 of docetaxel was January 17, 2024 when his PSA was 18 he was started on Pluvicto at [facility] in November 2024 and completed 4 total cycles before restaging on April 8, 2025.  That PSMA PET scan was notable for numerous new PSMA avid lesions and a PSA of 84, demonstrating clear progression while on Pluvicto and after the typical expected timeframe for treatment related flare.     He signed consent for the WBK002 and was randomized to the study drug (xaluritamig) arm.  -C1D1 on 6/16/25 at 0.1mg and tolerated without issues.    -C1D8 on 6/24 at 0.3mg.  Muscle aches and sore throat ~4 days after infusion, resolved after 2 tylenol and ibuprofen.    -C1D15 on 7/1/25 at 1mg.  Fever overnight C1D20 to 100.9F.  No aches.  Rash developed on C1D19 and improved with OTC hydrocortisone.    -C0C59-5.5mg administered.  PSA with 95% reduction compared to pre-treatment baseline.  Continue OTC hydrocortisone cream at prior sites of rash.      PLAN:   Please continue to take tylenol 1000mg every 6 hours for 24 hours AFTER discharge (4 doses in total).   2.   Once you are done with that 24 hours of required tylenol, I would decrease to 1000mg every 8 hours of tylenol and 600mg every 6 hours of ibuprofen even if you are feeling well.  This helps reduce the muscle aches and stiffness that you felt over the weekend after your 4th dose, particularly with your inflammation markers on the rise.    3. Continue to aggressively hydrate with 11 cups of water or non-caffeinated fluids daily.  4. Watch your blood pressure and temperature.  5. Let us know right away if you have a fever of 100.4F or higher, blood pressure with top number less than 100, or oxygen level less than 90%. During the day reach out to the study team. After hours and weekends, call me directly at [phone]  regardless of the time of day.  7. If you take ibuprofen, you need to take this with famotidine to protect the lining of your stomach.  8. Let me know if you get worsening pain or symptoms around your fistula.  9. Continue hydrocortisone cream to areas where you had a rash over the weekend.  Let us know if you have any new sites of  rash.    10. Keep your home action kit with you if you leave home for more than 2-3 hours.  11. For routine updates (no fever, low blood pressure, or low oxygen) you can give us updates in MyChart.  12. Take dexamethasone (2x 4mg tablets) the evening prior to next week's dose as you have been doing so far.

## 2025-07-14 NOTE — NURSING NOTE
"Oncology Rooming Note    July 14, 2025 1:19 PM   Wenceslao Finch is a 70 year old male who presents for:    Chief Complaint   Patient presents with    Blood Draw     Labs obtained via noncoring powerport 20 gauge, 3/4 inch needle, heparin flushed, VS taken, checked into next appt    Oncology Clinic Visit     RTN Hormone resistant Prostate CA      Initial Vitals: BP (!) 143/93   Pulse 111   Temp 97.8  F (36.6  C) (Oral)   Resp 18   Wt 122 kg (268 lb 14.4 oz)   SpO2 96%   BMI 37.03 kg/m   Estimated body mass index is 37.03 kg/m  as calculated from the following:    Height as of 6/16/25: 1.815 m (5' 11.46\").    Weight as of this encounter: 122 kg (268 lb 14.4 oz). Body surface area is 2.48 meters squared.  No Pain (0) Comment: Data Unavailable   No LMP for male patient.  Allergies reviewed: Yes  Medications reviewed: Yes    Medications: Medication refills not needed today.  Pharmacy name entered into [employer]: [id number] - [place], [place] - [address]    PHQ9:  Did this patient require a PHQ9?: No      Clinical concerns: None      Anjali Luciano             "

## 2025-07-14 NOTE — Clinical Note
2025      Wenceslao Finch  [address] [place] [address]      Dear Colleague,    Thank you for referring your patient, Wenceslao Finch, to the [facility]. Please see a copy of my visit note below.      [facility] Return Visit Note       Date of visit: 2025       CC: [id number], C1D22     INTERVAL HISTORY:     -felt great this week  -stayed on proactive tylenol every 8, ibuprofen every 6, famotidine BID   -highest temp was 100.0  -had trouble raising arms over his head over the last couple of days, icy hot to left shoulder and right front thigh yesterday which resolved it  -energy is good, going back to work this week one day, better than the week prior  -         ONCOLOGY HISTORY: .   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  -23, PSA 9.8, switched to Abiraterone + prednisone 5 mg PO daily + ADT  -2023, PSA 13, PSMA (stable/bone avid mets) => Apalutamide declined by insurance  -2024, PSA 18, initiated C1 docetaxel Q21D + prednisone Smg BID + ADT  PSA is 21<PSA is 19<PSA is 14.9, PSA is 9.59 as of 2024.  -: completed C10D1 docetaxel Q21D + prednisone S5mg BID + ADT.  - 24: completed C11D1 docetaxel Q21D + prednisone 5mg BID + ADT + C1D64 Eligard.  PSA 9.55  - 2024: completed C12D1 docetaxel Q21D + prednisone Smg BID + ADT. PSA 11.00  - 24 PSMA PET showing disease progression. Consult to Rad Onc.  PSA 8.89.   - 10/23/24-PSA 18.40  - 24: Pluvicto started, given every 6 weeks. Managed by [facility] Rad Onc team.  - 24- PSA 35.00  - 25: Cycle 2 Pluvicto to be given later today. Pt has Rad Onc BAYLEE fi/u for following labs and PSA.  - 25- PSA 68.00  - 25: Proceed Eligard shot today, along with Zometa.. Continues on  darolutamide 600 mg BID.  - 4/1/25: Received cycle 4 Pluvicto. PSA is 84.90 on 4/9/2025.  Clear progression on PSMA PET from 4/8 with numerous new PSMA avid lesions.    - 5/5/25- Second opinion at [facility].  -5/19/25- [id number] Phase 3 consent signed.  Screening initiated.  Stop Darolutamide.  Reduce prednisone to 5mg daily x7 days and then 5mg every other day.    -6/17/25-C1D1 [id number] 0.1mg.  Tolerated without issues.   -6/24/25-C1D8 [id number] 0.3mg in CRU.    -7/1/25-C1D15 [id number] 1mg in CRU.  PSA 54.00 pre-infusion.     -7/8/25-C1D22 [id number] 1.5mg in CRU.  PSA 15.80 pre-infusion.    -7/14/25- C2D1 [id number] 1.5mg in [facility], PSA 8.37 pre-infusion       ALLERGIES: No known drug allergies     MEDS:  Current Outpatient Medications   Medication Sig Dispense Refill    acetaminophen (TYLENOL) 500 MG tablet Take 2 tablets (1,000 mg) by mouth every 6 hours. Take for the first 24 hours after discharge for all xaluritamig doses in Cycle 1. Maximum 4000 mg in 24 hours. 120 tablet 5    calcium carbonate 500 mg, elemental, (OSCAL 500) 1250 (500 Ca) MG TABS tablet Take 500 mg by mouth daily.      ciprofloxacin (CIPRO) 500 MG tablet Take 1 tablet (500 mg) by mouth 2 times daily as needed (Take only if instructed to do so by Dr. Boss or the [id number] team for fevers.). 14 tablet 0    dexAMETHasone (DECADRON) 4 MG tablet Take 2 tablets (8 mg) by mouth See Admin Instructions. Take 2 tablets (8 mg) the evening (approximately 12-16 hours) prior to the first 4 xaluritamig doses. Record time taken. 30 tablet 5    famotidine (PEPCID) 20 MG tablet Take 1 tablet (20 mg) by mouth 2 times daily as needed (Take when taking ibuprofen). 60 tablet 5    ibuprofen (ADVIL/MOTRIN) 200 MG tablet Take 3 tablets (600 mg) by mouth every 6 hours as needed for pain (muscle aches). 240 tablet 1    loratadine (CLARITIN) 10 MG tablet Take 10 mg by mouth daily.      methocarbamol (ROBAXIN) 750 MG tablet Take 1 tablet (750 mg) by mouth 3 times daily as needed  for muscle spasms. 90 tablet 5    metroNIDAZOLE (FLAGYL) 500 MG tablet Take 1 tablet (500 mg) by mouth 3 times daily as needed (Take only if instructed to do so by Dr. Boss or the [facility] team for fevers.). 21 tablet 0    venlafaxine (EFFEXOR XR) 150 MG 24 hr capsule Take 150 mg by mouth every evening.      vitamin D3 (CHOLECALCIFEROL) 50 mcg (2000 units) tablet Take 1 tablet by mouth 2 times daily.      vitamin E (TOCOPHEROL) 400 units (180 mg) capsule Take 400 Units by mouth daily.       No current facility-administered medications for this visit.             PHYSICAL EXAM:    ECOG-PS=1     General: The patient is a pleasant male in no acute distress.  BP (!) 143/93   Pulse 111   Temp 97.8  F (36.6  C) (Oral)   Resp 18   Wt 122 kg (268 lb 14.4 oz)   SpO2 96%   BMI 37.03 kg/m    Wt Readings from Last 10 Encounters:   07/14/25 122 kg (268 lb 14.4 oz)   07/08/25 120.8 kg (266 lb 5.1 oz)   07/01/25 119.9 kg (264 lb 5.3 oz)   06/24/25 121.7 kg (268 lb 4.8 oz)   06/17/25 124.1 kg (273 lb 11.2 oz)   05/19/25 120.4 kg (265 lb 8 oz)   05/05/25 121.1 kg (267 lb)   HEENT: EOMI. Sclerae are anicteric. Oral mucosa pink and moist without lesions or thrush.   Lymph: Neck is supple with no lymphadenopathy in the cervical or supraclavicular areas.   Heart: Regular rate and rhythm.   Lungs: Clear to auscultation bilaterally.   Abdomen: Bowel sounds present, soft, nontender with no palpable hepatosplenomegaly or masses.   Extremities: No lower extremity edema noted bilaterally.   Neuro: Cranial nerves II through XII are grossly intact. Able to get on and off of the exam table without difficulty.  Skin: No rashes, petechiae, or bruising noted on exposed skin.       Level of consciousness alert   Orientation {time, place, and person  Vision no vision changes  Cranial nerves and brain stem functions Normal   Pyramidal and extra pyramidal motor system reflexes Normal  Muscle tone and trophic findings Normal  Coordination Finger to  "nose test showed normal findings   Heel to shin test showed normal findings  Normal rapid alternating movements and no pronator drift   Sensory system decreased at baseline in R 5th finger sensation  neuropsychological findings (eg, speech, cognition, and emotion). Normal     Cellular Therapy Toxicity Monitoring Note  Date: 07/08/2025   Wenceslao Finch ([id number]) is a 70 year old year old male who is currently day  of CAR-T cell therapy or bispecific antibody therapy.     1) CRS Grading (see table below for grading schema)  Fever:              </= 100.4  Blood pressure:              SBP >/= 90 (not hypotensive)  Oxygen saturation:               >/= 90% on room air (not hypoxic)     Current CRS stgstrstastdstest:st st1st 2) ICANS Grading (see table below for grading schema)     Patient is alert & oriented x4. There are signs of headache, muscle spasms or rigidity, or vision changes. No.     ICANS score/Neurotoxicity assessment  Score:  4   Orientation: Orientation to year, month, city, hospital: 4 points (1 point each)  3   Identify: Name 3 objects that provider points to (e.g. clock, pen, button): 3 points (1 point each)  1   Following Commands: (e.g. show me two fingers or close your eyes and stick out your tongue): 1 point  1   Writing: Ability to write a standard sentence (see writing page \"Our national bird is the bald eagle.\"): 1 point  1   Attention: Count backwards from 100 by 10s: 1 point     10   Total: Max 10     ICE Score: 0  (10 = Grade 0; 7-9 = Grade 1; 3-6 = Grade 2; 0-2 = Grade 3; 0 = Grade 4)     Seizure              No seizures     Motor Findings              No motor findings     Elevated ICP/Cerebral Edema              None     Current ICANS stgstrstastdstest:st st1st Table 1: CRS Grading per ASTCT (https://doi.org/10.1016/j.bbmt.[email or web address])       Tabe 2: Neurotoxicity Grading per ASTCT (https://doi.org/10.1016/j.bbmt.[email or web address])          Labs and Imaging:       Most Recent 3 CBC's:  Recent Labs   Lab Test " 07/14/25  1239 07/08/25  0853 07/01/25  1017   WBC 15.3* 13.8* 12.4*   HGB 9.8* 10.4* 11.4*   MCV 92 90 93    312 327   ANEU 13.7* 13.1* 11.4*     Most Recent 3 BMP's:  Recent Labs   Lab Test 07/14/25  1239 07/08/25  0853 07/01/25  1017   * 132* 133*   POTASSIUM 4.6 4.4 4.4   CHLORIDE 100 98 99   CO2 22 20* 20*   BUN 19.9 17.8 21.3   CR 0.89 1.09 1.12   ANIONGAP 11 14 14   EARL 9.3 9.5 9.4   * 208* 200*   PROTTOTAL 6.8 7.1 7.0   ALBUMIN 3.4* 3.3* 3.6    Most Recent 3 LFT's:  Recent Labs   Lab Test 07/14/25  1239 07/08/25  0853 07/01/25  1017   AST 19 17 17   ALT 17 15 12   ALKPHOS 67 59 54   BILITOTAL <0.2 0.2 0.2    Most Recent 2 TSH and T4:No lab results found.     Latest Reference Range & Units 06/16/25 10:55 06/17/25 04:47 06/24/25 09:15 07/01/25 10:17 07/08/25 08:53 07/14/25 12:39   CRP Inflammation <5.00 mg/L 8.81 (H)  13.10 (H) 59.10 (H) 329.00 (H) 155.00 (H)   Ferritin 31 - 409 ng/mL 272 299 355 438 (H) 801 (H) 786 (H)   Glucose 70 - 99 mg/dL 165 (H) 130 (H) 143 (H) 200 (H) 208 (H) 144 (H)       PSA Tumor Marker   Date Value Ref Range Status   07/08/2025 15.80 (H) 0.00 - 4.50 ng/mL Final   07/01/2025 54.00 (H) 0.00 - 4.50 ng/mL Final   06/16/2025 305.00 (H) 0.00 - 4.50 ng/mL Final       I reviewed the above labs today.               IMPRESSION AND PLAN:     # metastatic castration resistant prostate cancer     Burt was referred for second opinion regarding potential clinical trials and was initially seen on May 5, 2025.  He was initially diagnosed with Yarelis 4+3 equal 7 prostate adenocarcinoma in 2016 underwent radical prostatectomy at that time.  PSA became detectable again in 2018 and he was initiated on ADT for 2 years total, which was then held.  PSA began to rise approximately 4 months later (November 2020) after holding ADT when his PSA was 7.  He was restarted on ADT with Dr. Caro in February 2021 and continued on ADT, notably his PSA began to rise significantly in January 2022  rising from 26.8-40.  He was started on enzalutamide in January 2022 for nonmetastatic prostate cancer that was hormone resistant.  His first metastasis appeared on nuclear medicine bone scan in 2023 in the left mid sternum, prompting switch to abiraterone 1000 mg daily along with prednisone.  His PSA continued to slowly rise through January 2024 when he was started on docetaxel in the CRPC setting.  Cycle 1 day 1 of docetaxel was January 17, 2024 when his PSA was 18 he was started on Pluvicto at [facility] in November 2024 and completed 4 total cycles before restaging on April 8, 2025.  That PSMA PET scan was notable for numerous new PSMA avid lesions and a PSA of 84, demonstrating clear progression while on Pluvicto and after the typical expected timeframe for treatment related flare.     He signed consent for the RGV182 and was randomized to the study drug (xaluritamig) arm.  -C1D1 on 6/16/25 at 0.1mg and tolerated without issues.    -C1D8 on 6/24 at 0.3mg.  Muscle aches and sore throat ~4 days after infusion, resolved after 2 tylenol and ibuprofen.    -C1D15 on 7/1/25 at 1mg.  Fever overnight C1D20 to 100.9F.  No aches.  Rash developed on C1D19 and improved with OTC hydrocortisone.    -S2Y19-6.5mg administered.  PSA with 95% reduction compared to pre-treatment baseline.  Continue OTC hydrocortisone cream at prior sites of rash.      PLAN:   Please continue to take tylenol 1000mg every 6 hours for 24 hours AFTER discharge (4 doses in total).   2.   Once you are done with that 24 hours of required tylenol, I would decrease to 1000mg every 8 hours of tylenol and 600mg every 6 hours of ibuprofen even if you are feeling well.  This helps reduce the muscle aches and stiffness that you felt over the weekend after your 4th dose, particularly with your inflammation markers on the rise.    3. Continue to aggressively hydrate with 11 cups of water or non-caffeinated fluids daily.  4. Watch your blood pressure and  temperature.  5. Let us know right away if you have a fever of 100.4F or higher, blood pressure with top number less than 100, or oxygen level less than 90%. During the day reach out to the study team. After hours and weekends, call me directly at [phone] regardless of the time of day.  7. If you take ibuprofen, you need to take this with famotidine to protect the lining of your stomach.  8. Let me know if you get worsening pain or symptoms around your fistula.  9. Continue hydrocortisone cream to areas where you had a rash over the weekend.  Let us know if you have any new sites of  rash.    10. Keep your home action kit with you if you leave home for more than 2-3 hours.  11. For routine updates (no fever, low blood pressure, or low oxygen) you can give us updates in MyChart.  12. Take dexamethasone (2x 4mg tablets) the evening prior to next week's dose as you have been doing so far.               Again, thank you for allowing me to participate in the care of your patient.        Sincerely,        MARC Matos CNP    Electronically signed

## 2025-07-14 NOTE — PROGRESS NOTES
[id number]: Study Visit Note   Subject name: Wenceslao Finch     Visit: C2D1    Did the study visit occur within the appropriate window allowed by the protocol? yes    Since the last study visit, He has been doing well. Pt did not experience fatigue the weekend after his last infusion as he had for his previous dose. He did take alternating tylenol and ibuprofen per provider since his last infusion consistently, ongoing. Pt had a low grade fever (100.0) without symptoms that resolved upon waking.     I have personally interviewed Wenceslao Finch and reviewed his medical record for adverse events and concomitant medications and these have been recorded on the corresponding logs in Wenceslao Finch's research file.     Wenceslao Finch was given the opportunity to ask any trial related questions.  Please see provider progress note for physical exam and other clinical information. Labs were reviewed - any significant lab values were addressed and reviewed.    Liat Galindo RN

## 2025-07-14 NOTE — PATIENT INSTRUCTIONS
[facility] Triage and after hours / weekends / holidays:  [phone] option 5, option 2    Please call the triage or after hours line if you experience a temperature greater than or equal to 100.4, shaking chills, have uncontrolled nausea, vomiting and/or diarrhea, dizziness, shortness of breath, chest pain, bleeding, unexplained bruising, or if you have any other new/concerning symptoms, questions or concerns.      If you are having any concerning symptoms or wish to speak to a provider before your next infusion visit, please call triage to notify your care team so we can adequately serve you.     If you need a refill on a narcotic prescription or other medication, please call before your infusion appointment.

## 2025-07-21 ENCOUNTER — TELEPHONE (OUTPATIENT)
Dept: ONCOLOGY | Facility: CLINIC | Age: 70
End: 2025-07-21
Payer: COMMERCIAL

## 2025-07-21 DIAGNOSIS — Z19.2 HORMONE RESISTANT PROSTATE CANCER (H): ICD-10-CM

## 2025-07-21 DIAGNOSIS — C61 HORMONE RESISTANT PROSTATE CANCER (H): ICD-10-CM

## 2025-07-21 RX ORDER — ACETAMINOPHEN 500 MG
1000 TABLET ORAL EVERY 8 HOURS PRN
Qty: 240 TABLET | Refills: 5 | Status: SHIPPED | OUTPATIENT
Start: 2025-07-21

## 2025-07-21 RX ORDER — FAMOTIDINE 20 MG/1
20 TABLET, FILM COATED ORAL 2 TIMES DAILY PRN
Qty: 180 TABLET | Refills: 2 | Status: SHIPPED | OUTPATIENT
Start: 2025-07-21

## 2025-07-21 RX ORDER — IBUPROFEN 200 MG
600 TABLET ORAL EVERY 6 HOURS PRN
Qty: 300 TABLET | Refills: 1 | Status: SHIPPED | OUTPATIENT
Start: 2025-07-21

## 2025-07-21 NOTE — TELEPHONE ENCOUNTER
Burt called around 4:15PM today regarding fever, fatigue and tachycardia.      Tm was 101F, HR was 120s and BP was ~140 systolic.      He had been out doing yard work this afternoon.      Just took 1000mg of tylenol and stopped ibuprofen Saturday per the taper plan we discussed.     Added back ibuprofen and will monitor fever and BP.      Call back if fever not improving or SBP is 110s or less.

## 2025-07-25 NOTE — PROGRESS NOTES
[facility] Return Visit Note       Date of visit: 2025       CC: [id number], follow up prior to cycle 2d15      INTERVAL HISTORY:   Wenceslao returns to clinic accompanied by his wife.  - he is feeling well today.   - He has been following a taper plan with tylenol and ibuprofen after infusions. He had infusion on 25, discontinued ibuprofen on 25 per his plan at that time took tylenol only that day. He pushed it outside on 25 and subsequently had a fever of 100.6 F. He called in and as instructed to restart tylenol.   - he had mild mylagias in his legs/thighs last week but attributes this in part to PT vs. . He used icy hot on his thighs and had resolution of the pain.  - no shoulder pain with the last cycle.   - he has fatigue that is persistent.   - No recurrent sore throat. The tip of his tongue is sensitive and food tastes different but no anorexia. He has no mucositis.   - No recurrent rash   - no headaches or cognitive changes.     Remainder of 12 point ROS reviewed and negative except as in interval history.        ONCOLOGY HISTORY: .   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  -23, PSA 9.8, switched to Abiraterone + prednisone 5 mg PO daily + ADT  -2023, PSA 13, PSMA (stable/bone avid mets) => Apalutamide declined by insurance  -2024, PSA 18, initiated C1 docetaxel Q21D + prednisone Smg BID + ADT  PSA is 21<PSA is 19<PSA is 14.9, PSA is 9.59 as of 2024.  -: completed C10D1 docetaxel Q21D + prednisone S5mg BID + ADT.  - 24: completed C11D1 docetaxel Q21D + prednisone 5mg BID + ADT + C1D64 Eligard.  PSA 9.55  - 2024: completed C12D1 docetaxel Q21D + prednisone Smg BID + ADT. PSA 11.00  - 24 PSMA PET showing disease progression. Consult to Rad Onc.  PSA  8.89.   - 10/23/24-PSA 18.40  - 11/13/24: Pluvicto started, given every 6 weeks. Managed by [facility] Rad On[facility] team.  - 12/16/24- PSA 35.00  - 1/7/25: Cycle 2 Pluvicto to be given later today. Pt has Rad Onc BAYLEE fi/u for following labs and PSA.  - 1/24/25- PSA 68.00  - 2/5/25: Proceed Eligard shot today, along with Zometa.. Continues on darolutamide 600 mg BID.  - 4/1/25: Received cycle 4 Pluvicto. PSA is 84.90 on 4/9/2025.  Clear progression on PSMA PET from 4/8 with numerous new PSMA avid lesions.    - 5/5/25- Second opinion at [facility].  -5/19/25- [id number] Phase 3 consent signed.  Screening initiated.  Stop Darolutamide.  Reduce prednisone to 5mg daily x7 days and then 5mg every other day.    -6/17/25-C1D1 [id number] 0.1mg.  Tolerated without issues.   -6/24/25-C1D8 [id number] 0.3mg in CRU.    -7/1/25-C1D15 AMP137 1mg in CRU.  PSA 54.00 pre-infusion.     -7/8/25-C1D22 [id number] 1.5mg in CRU.  PSA 15.80 pre-infusion.    -7/14/25- C2D1 [id number] 1.5mg in [facility], PSA 8.37 pre-infusion       ALLERGIES: No known drug allergies     MEDS:  Current Outpatient Medications   Medication Sig Dispense Refill    acetaminophen (TYLENOL) 500 MG tablet Take 2 tablets (1,000 mg) by mouth every 8 hours as needed for mild pain (Post xaluritamig dose for 5 days.). Take 3 times a day for the first 5 days after xaluritamig dose and if fever or pain. Maximum 4000 mg in 24 hours. 240 tablet 5    calcium carbonate 500 mg, elemental, (OSCAL 500) 1250 (500 Ca) MG TABS tablet Take 500 mg by mouth daily.      ciprofloxacin (CIPRO) 500 MG tablet Take 1 tablet (500 mg) by mouth 2 times daily as needed (Take only if instructed to do so by Dr. Boss or the [facility] team for fevers.). 14 tablet 0    dexAMETHasone (DECADRON) 4 MG tablet Take 2 tablets (8 mg) by mouth See Admin Instructions. Take 2 tablets (8 mg) the evening (approximately 12-16 hours) prior to the first 4 xaluritamig doses. Record time taken. 30 tablet 5    famotidine (PEPCID) 20 MG tablet Take  1 tablet (20 mg) by mouth 2 times daily as needed (Take when taking ibuprofen). 180 tablet 2    ibuprofen (ADVIL/MOTRIN) 200 MG tablet Take 3 tablets (600 mg) by mouth every 6 hours as needed for pain (muscle aches). 300 tablet 1    loratadine (CLARITIN) 10 MG tablet Take 10 mg by mouth daily.      methocarbamol (ROBAXIN) 750 MG tablet Take 1 tablet (750 mg) by mouth 3 times daily as needed for muscle spasms. 90 tablet 5    metroNIDAZOLE (FLAGYL) 500 MG tablet Take 1 tablet (500 mg) by mouth 3 times daily as needed (Take only if instructed to do so by Dr. Boss or the [facility] [place] for fevers.). 21 tablet 0    venlafaxine (EFFEXOR XR) 150 MG 24 hr capsule Take 150 mg by mouth every evening.      vitamin D3 (CHOLECALCIFEROL) 50 mcg (2000 units) tablet Take 1 tablet by mouth 2 times daily.      vitamin E (TOCOPHEROL) 400 units (180 mg) capsule Take 400 Units by mouth daily.       No current facility-administered medications for this visit.       PHYSICAL EXAM:    ECOG-PS=1     General: The patient is a pleasant male in no acute distress.  /78   Pulse 89   Temp 98.7  F (37.1  C) (Oral)   Resp 16   Wt 120.2 kg (265 lb)   SpO2 100%   BMI 36.49 kg/m    Wt Readings from Last 10 Encounters:   07/28/25 120.2 kg (265 lb)   07/14/25 122 kg (268 lb 14.4 oz)   07/08/25 120.8 kg (266 lb 5.1 oz)   07/01/25 119.9 kg (264 lb 5.3 oz)   06/24/25 121.7 kg (268 lb 4.8 oz)   06/17/25 124.1 kg (273 lb 11.2 oz)   05/19/25 120.4 kg (265 lb 8 oz)   05/05/25 121.1 kg (267 lb)   General: No acute distress  HEENT: Sclera anicteric. Oral mucosa pink and moist.  No mucositis or thrush  Lymph: No lymphadenopathy in neck  Heart: Regular, rate, and rhythm  Lungs: Clear to ascultation bilaterally  Abdomen: Positive bowel sounds. Soft, non-distended, non-tender. No organomegaly or mass.   Extremities: no lower extremity edema  Neuro: Cranial nerves grossly intact  Rash: none     Not done July 28, 2025:   Level of consciousness alert  "  Orientation {time, place, and person  Vision no vision changes  Cranial nerves and brain stem functions Normal   Pyramidal and extra pyramidal motor system reflexes Normal  Muscle tone and trophic findings Normal  Coordination Finger to nose test showed normal findings   Heel to shin test showed normal findings  Normal rapid alternating movements and no pronator drift   Sensory system decreased at baseline in R 5th finger sensation  neuropsychological findings (eg, speech, cognition, and emotion). Normal     Cellular Therapy Toxicity Monitoring Note  Date: 07/08/2025   Wenceslao Finch ([id number]) is a 70 year old year old male who is currently day  of CAR-T cell therapy or bispecific antibody therapy.     1) CRS Grading (see table below for grading schema)  Fever:              </= 100.4  Blood pressure:              SBP >/= 90 (not hypotensive)  Oxygen saturation:               >/= 90% on room air (not hypoxic)     Current CRS stgstrstastdstest:st st1st 2) ICANS Grading (see table below for grading schema)     Patient is alert & oriented x4. There are signs of headache, muscle spasms or rigidity, or vision changes. No.     ICANS score/Neurotoxicity assessment  Score:  4   Orientation: Orientation to year, month, city, hospital: 4 points (1 point each)  3   Identify: Name 3 objects that provider points to (e.g. clock, pen, button): 3 points (1 point each)  1   Following Commands: (e.g. show me two fingers or close your eyes and stick out your tongue): 1 point  1   Writing: Ability to write a standard sentence (see writing page \"Our national bird is the bald eagle.\"): 1 point  1   Attention: Count backwards from 100 by 10s: 1 point     10   Total: Max 10     ICE Score: 0  (10 = Grade 0; 7-9 = Grade 1; 3-6 = Grade 2; 0-2 = Grade 3; 0 = Grade 4)     Seizure              No seizures     Motor Findings              No motor findings     Elevated ICP/Cerebral Edema              None     Current ICANS stgstrstastdstest:st st1st Table 1: " CRS Grading per ASTCT (https://doi.org/10.1016/j.bbmt.[email or web address])       Tabe 2: Neurotoxicity Grading per ASTCT (https://doi.org/10.1016/j.bbmt.[email or web address])          Labs and Imaging:       Most Recent 3 CBC's:  Recent Labs   Lab Test 07/14/25  1239 07/08/25  0853 07/01/25  1017   WBC 15.3* 13.8* 12.4*   HGB 9.8* 10.4* 11.4*   MCV 92 90 93    312 327   ANEU 13.7* 13.1* 11.4*     Most Recent 3 BMP's:  Recent Labs   Lab Test 07/14/25  1239 07/08/25  0853 07/01/25  1017   * 132* 133*   POTASSIUM 4.6 4.4 4.4   CHLORIDE 100 98 99   CO2 22 20* 20*   BUN 19.9 17.8 21.3   CR 0.89 1.09 1.12   ANIONGAP 11 14 14   EARL 9.3 9.5 9.4   * 208* 200*   PROTTOTAL 6.8 7.1 7.0   ALBUMIN 3.4* 3.3* 3.6    Most Recent 3 LFT's:  Recent Labs   Lab Test 07/14/25  1239 07/08/25  0853 07/01/25  1017   AST 19 17 17   ALT 17 15 12   ALKPHOS 67 59 54   BILITOTAL <0.2 0.2 0.2    Most Recent 2 TSH and T4:No lab results found.     Latest Reference Range & Units 06/16/25 10:55 06/17/25 04:47 06/24/25 09:15 07/01/25 10:17 07/08/25 08:53 07/14/25 12:39   CRP Inflammation <5.00 mg/L 8.81 (H)  13.10 (H) 59.10 (H) 329.00 (H) 155.00 (H)   Ferritin 31 - 409 ng/mL 272 299 355 438 (H) 801 (H) 786 (H)   Glucose 70 - 99 mg/dL 165 (H) 130 (H) 143 (H) 200 (H) 208 (H) 144 (H)       PSA Tumor Marker   Date Value Ref Range Status   07/14/2025 8.37 (H) 0.00 - 4.50 ng/mL Final   07/08/2025 15.80 (H) 0.00 - 4.50 ng/mL Final   07/01/2025 54.00 (H) 0.00 - 4.50 ng/mL Final   06/16/2025 305.00 (H) 0.00 - 4.50 ng/mL Final       I reviewed the above labs today.       IMPRESSION AND PLAN:     # metastatic castration resistant prostate cancer  Burt was referred for second opinion regarding potential clinical trials and was initially seen on May 5, 2025.  He was initially diagnosed with [place] 4+3 equal 7 prostate adenocarcinoma in 2016 underwent radical prostatectomy at that time.  PSA became detectable again in 2018 and he was initiated on ADT for 2 years  total, which was then held.  PSA began to rise approximately 4 months later (November 2020) after holding ADT when his PSA was 7.  He was restarted on ADT with Dr. Caro in February 2021 and continued on ADT, notably his PSA began to rise significantly in January 2022 rising from 26.8-40.  He was started on enzalutamide in January 2022 for nonmetastatic prostate cancer that was hormone resistant.  His first metastasis appeared on nuclear medicine bone scan in 2023 in the left mid sternum, prompting switch to abiraterone 1000 mg daily along with prednisone.  His PSA continued to slowly rise through January 2024 when he was started on docetaxel in the CRPC setting.  Cycle 1 day 1 of docetaxel was January 17, 2024 when his PSA was 18 he was started on Pluvicto at [facility] in November 2024 and completed 4 total cycles before restaging on April 8, 2025.  That PSMA PET scan was notable for numerous new PSMA avid lesions and a PSA of 84, demonstrating clear progression while on Pluvicto and after the typical expected timeframe for treatment related flare.     He signed consent for the [id number] and was randomized to the study drug (xaluritamig) arm.  -C1D1 on 6/16/25 at 0.1mg and tolerated without issues.    -C1D8 on 6/24 at 0.3mg.  Muscle aches and sore throat ~4 days after infusion, resolved after 2 tylenol and ibuprofen.    -C1D15 on 7/1/25 at 1mg.  Fever overnight C1D20 to 100.9F.  No aches.  Rash developed on C1D19 and improved with OTC hydrocortisone.    -X0H42-0.5mg administered.  PSA with 95% reduction compared to pre-treatment baseline.  Continue OTC hydrocortisone cream at prior sites of rash.   -C2D1 - 1.5mg administered on 7/14/25. Tolerated well. Had one fever after tapering off of ibuprofen/tylenol. Continued tylenol with resolution.      PLAN:   Cycle 2D14 to be given today as scheduled with no evening dexamethasone prior. He will get tylenol 650 mg with premedications and 4 mg dexamethasone.    Please  continue to take tylenol 1000mg every 6 hours for 24 hours AFTER discharge (4 doses in total).   Once you are done with that 24 hours of required tylenol, I would decrease to 1000mg every 8 hours of tylenol and 600mg every 6 hours of ibuprofen even if you are feeling well for the first week after infusion.   After one week, pull back on ibuprofen and then if no recurrent symptoms can discontinue tylenol 24 hours later.  Continue to aggressively hydrate with 11 cups of water or non-caffeinated fluids daily  Monitor your blood pressure and temperature.  Let us know right away if you have a fever of 100.4F or higher, blood pressure with top number less than 100, or oxygen level less than 90%. During the day reach out to the study team. After hours and weekends, call Dr. Boss directly at [phone] regardless of the time of day.  If you take ibuprofen, you need to take this with famotidine to protect the lining of your stomach.  Let me know if you get worsening pain or symptoms around your fistula.  Continue hydrocortisone cream to areas of rash if it returns. Let us know if rash recurs.     Keep your home action kit with you if you leave home for more than 2-3 hours.  For routine updates (no fever, low blood pressure, or low oxygen) you can give us updates in [facility].    28 minutes spent on the date of the encounter doing chart review, review of test results, interpretation of tests, patient visit, and documentation     Seen with Yolis Pittman RN . Discussed plan with Dr. Boss.     The longitudinal plan of care for the diagnosis(es)/condition(s) as documented were addressed during this visit. Due to the added complexity in care, I will continue to support Wenceslao in the subsequent management and with ongoing continuity of care.    Nataliya Vann PA-C

## 2025-07-28 ENCOUNTER — INFUSION THERAPY VISIT (OUTPATIENT)
Dept: ONCOLOGY | Facility: CLINIC | Age: 70
End: 2025-07-28
Attending: STUDENT IN AN ORGANIZED HEALTH CARE EDUCATION/TRAINING PROGRAM
Payer: COMMERCIAL

## 2025-07-28 ENCOUNTER — ALLIED HEALTH/NURSE VISIT (OUTPATIENT)
Dept: ONCOLOGY | Facility: CLINIC | Age: 70
End: 2025-07-28

## 2025-07-28 ENCOUNTER — APPOINTMENT (OUTPATIENT)
Dept: LAB | Facility: CLINIC | Age: 70
End: 2025-07-28
Attending: STUDENT IN AN ORGANIZED HEALTH CARE EDUCATION/TRAINING PROGRAM
Payer: COMMERCIAL

## 2025-07-28 VITALS
SYSTOLIC BLOOD PRESSURE: 152 MMHG | TEMPERATURE: 98.5 F | OXYGEN SATURATION: 99 % | HEART RATE: 82 BPM | DIASTOLIC BLOOD PRESSURE: 86 MMHG | RESPIRATION RATE: 18 BRPM

## 2025-07-28 VITALS
OXYGEN SATURATION: 100 % | TEMPERATURE: 98.7 F | SYSTOLIC BLOOD PRESSURE: 135 MMHG | RESPIRATION RATE: 16 BRPM | BODY MASS INDEX: 36.49 KG/M2 | WEIGHT: 265 LBS | DIASTOLIC BLOOD PRESSURE: 78 MMHG | HEART RATE: 89 BPM

## 2025-07-28 DIAGNOSIS — C61 HORMONE RESISTANT PROSTATE CANCER (H): Primary | ICD-10-CM

## 2025-07-28 DIAGNOSIS — Z19.2 HORMONE RESISTANT PROSTATE CANCER (H): Primary | ICD-10-CM

## 2025-07-28 DIAGNOSIS — Z19.2 METASTATIC CASTRATION-RESISTANT ADENOCARCINOMA OF PROSTATE (H): Primary | ICD-10-CM

## 2025-07-28 DIAGNOSIS — C61 METASTATIC CASTRATION-RESISTANT ADENOCARCINOMA OF PROSTATE (H): Primary | ICD-10-CM

## 2025-07-28 LAB
FERRITIN SERPL-MCNC: 706 NG/ML (ref 31–409)
LDH SERPL L TO P-CCNC: 155 U/L (ref 0–250)
PSA SERPL DL<=0.01 NG/ML-MCNC: 11.3 NG/ML (ref 0–4.5)

## 2025-07-28 PROCEDURE — 83615 LACTATE (LD) (LDH) ENZYME: CPT

## 2025-07-28 PROCEDURE — 258N000003 HC RX IP 258 OP 636

## 2025-07-28 PROCEDURE — 36415 COLL VENOUS BLD VENIPUNCTURE: CPT

## 2025-07-28 PROCEDURE — 300N000004 RESEARCH KIT COLLECTION

## 2025-07-28 PROCEDURE — 250N000012 HC RX MED GY IP 250 OP 636 PS 637

## 2025-07-28 PROCEDURE — 82728 ASSAY OF FERRITIN: CPT

## 2025-07-28 PROCEDURE — 84153 ASSAY OF PSA TOTAL: CPT

## 2025-07-28 PROCEDURE — 250N000011 HC RX IP 250 OP 636

## 2025-07-28 PROCEDURE — 36591 DRAW BLOOD OFF VENOUS DEVICE: CPT

## 2025-07-28 PROCEDURE — G0463 HOSPITAL OUTPT CLINIC VISIT: HCPCS

## 2025-07-28 PROCEDURE — 250N000013 HC RX MED GY IP 250 OP 250 PS 637

## 2025-07-28 PROCEDURE — 96413 CHEMO IV INFUSION 1 HR: CPT

## 2025-07-28 RX ORDER — ACETAMINOPHEN 325 MG/1
650 TABLET ORAL ONCE
Status: CANCELLED
Start: 2025-07-28 | End: 2025-07-28

## 2025-07-28 RX ORDER — HEPARIN SODIUM,PORCINE 10 UNIT/ML
5-20 VIAL (ML) INTRAVENOUS DAILY PRN
Status: CANCELLED | OUTPATIENT
Start: 2025-07-28

## 2025-07-28 RX ORDER — MEPERIDINE HYDROCHLORIDE 25 MG/ML
25 INJECTION INTRAMUSCULAR; INTRAVENOUS; SUBCUTANEOUS
Status: CANCELLED | OUTPATIENT
Start: 2025-07-28

## 2025-07-28 RX ORDER — DEXAMETHASONE 4 MG/1
4 TABLET ORAL ONCE
Status: COMPLETED | OUTPATIENT
Start: 2025-07-28 | End: 2025-07-28

## 2025-07-28 RX ORDER — FAMOTIDINE 20 MG/1
20 TABLET, FILM COATED ORAL EVERY 12 HOURS PRN
Status: CANCELLED | OUTPATIENT
Start: 2025-07-28

## 2025-07-28 RX ORDER — HEPARIN SODIUM (PORCINE) LOCK FLUSH IV SOLN 100 UNIT/ML 100 UNIT/ML
5 SOLUTION INTRAVENOUS
Status: DISCONTINUED | OUTPATIENT
Start: 2025-07-28 | End: 2025-07-28 | Stop reason: HOSPADM

## 2025-07-28 RX ORDER — ACETAMINOPHEN 500 MG
1000 TABLET ORAL EVERY 6 HOURS PRN
Status: CANCELLED | OUTPATIENT
Start: 2025-07-28

## 2025-07-28 RX ORDER — HEPARIN SODIUM (PORCINE) LOCK FLUSH IV SOLN 100 UNIT/ML 100 UNIT/ML
5 SOLUTION INTRAVENOUS
Status: CANCELLED | OUTPATIENT
Start: 2025-07-28

## 2025-07-28 RX ORDER — ALBUTEROL SULFATE 0.83 MG/ML
2.5 SOLUTION RESPIRATORY (INHALATION)
Status: CANCELLED | OUTPATIENT
Start: 2025-07-28

## 2025-07-28 RX ORDER — ALBUTEROL SULFATE 90 UG/1
1-2 INHALANT RESPIRATORY (INHALATION)
Status: CANCELLED
Start: 2025-07-28

## 2025-07-28 RX ORDER — METHYLPREDNISOLONE SODIUM SUCCINATE 40 MG/ML
40 INJECTION INTRAMUSCULAR; INTRAVENOUS
Status: CANCELLED
Start: 2025-07-28

## 2025-07-28 RX ORDER — IBUPROFEN 200 MG
400 TABLET ORAL EVERY 8 HOURS PRN
Status: CANCELLED | OUTPATIENT
Start: 2025-07-28

## 2025-07-28 RX ORDER — ACETAMINOPHEN 325 MG/1
650 TABLET ORAL ONCE
Status: COMPLETED | OUTPATIENT
Start: 2025-07-28 | End: 2025-07-28

## 2025-07-28 RX ORDER — HEPARIN SODIUM (PORCINE) LOCK FLUSH IV SOLN 100 UNIT/ML 100 UNIT/ML
5 SOLUTION INTRAVENOUS ONCE
Status: COMPLETED | OUTPATIENT
Start: 2025-07-28 | End: 2025-07-28

## 2025-07-28 RX ORDER — DIPHENHYDRAMINE HYDROCHLORIDE 50 MG/ML
25 INJECTION, SOLUTION INTRAMUSCULAR; INTRAVENOUS
Status: CANCELLED
Start: 2025-07-28

## 2025-07-28 RX ORDER — DIPHENHYDRAMINE HYDROCHLORIDE 50 MG/ML
50 INJECTION, SOLUTION INTRAMUSCULAR; INTRAVENOUS
Status: CANCELLED
Start: 2025-07-28

## 2025-07-28 RX ORDER — EPINEPHRINE 1 MG/ML
0.3 INJECTION, SOLUTION INTRAMUSCULAR; SUBCUTANEOUS EVERY 5 MIN PRN
Status: CANCELLED | OUTPATIENT
Start: 2025-07-28

## 2025-07-28 RX ORDER — DEXAMETHASONE SODIUM PHOSPHATE 10 MG/ML
8 INJECTION, SOLUTION INTRAMUSCULAR; INTRAVENOUS
Status: CANCELLED | OUTPATIENT
Start: 2025-07-28

## 2025-07-28 RX ORDER — DEXAMETHASONE 4 MG/1
4 TABLET ORAL ONCE
Status: CANCELLED
Start: 2025-07-28 | End: 2025-07-28

## 2025-07-28 RX ORDER — LORAZEPAM 2 MG/ML
0.5 INJECTION INTRAMUSCULAR EVERY 4 HOURS PRN
Status: CANCELLED | OUTPATIENT
Start: 2025-07-28

## 2025-07-28 RX ADMIN — ACETAMINOPHEN 650 MG: 325 TABLET ORAL at 13:29

## 2025-07-28 RX ADMIN — Medication 5 ML: at 11:31

## 2025-07-28 RX ADMIN — SODIUM CHLORIDE 250 ML: 0.9 INJECTION, SOLUTION INTRAVENOUS at 13:26

## 2025-07-28 RX ADMIN — DEXAMETHASONE 4 MG: 4 TABLET ORAL at 13:29

## 2025-07-28 RX ADMIN — Medication 5 ML: at 14:43

## 2025-07-28 ASSESSMENT — PAIN SCALES - GENERAL: PAINLEVEL_OUTOF10: NO PAIN (0)

## 2025-07-28 NOTE — Clinical Note
2025      Wenceslao Finch  [address] [place] [address]      Dear Colleague,    Thank you for referring your patient, Wenceslao Finch, to the [facility]. Please see a copy of my visit note below.      [facility] Return Visit Note       Date of visit: 2025       CC: [id number], follow up prior to cycle 2d15      INTERVAL HISTORY:   Wenceslao returns to clinic accompanied by his wife.  - he is feeling well today.   - He has been following a taper plan with tylenol and ibuprofen after infusions. He had infusion on 25, discontinued ibuprofen on 25 per his plan at that time took tylenol only that day. He pushed it outside on 25 and subsequently had a fever of 100.6 F. He called in and as instructed to restart tylenol.   - he had mild mylagias in his legs/thighs last week but attributes this in part to PT vs. . He used icy hot on his thighs and had resolution of the pain.  - no shoulder pain with the last cycle.   - he has fatigue that is persistent.   - No recurrent sore throat. The tip of his tongue is sensitive and food tastes different but no anorexia. He has no mucositis.   - No recurrent rash   - no headaches or cognitive changes.     Remainder of 12 point ROS reviewed and negative except as in interval history.        ONCOLOGY HISTORY: .   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  -23, PSA 9.8, switched to Abiraterone + prednisone 5 mg PO daily + ADT  -2023, PSA 13, PSMA (stable/bone avid mets) => Apalutamide declined by insurance  -2024, PSA 18, initiated C1 docetaxel Q21D + prednisone Smg BID + ADT  PSA is 21<PSA is 19<PSA is 14.9, PSA is 9.59 as of 2024.  -: completed C10D1 docetaxel Q21D + prednisone S5mg BID + ADT.  -  8/21/24: completed C11D1 docetaxel Q21D + prednisone 5mg BID + ADT + C1D64 Eligard.  PSA 9.55  - 9/11/2024: completed C12D1 docetaxel Q21D + prednisone Smg BID + ADT. PSA 11.00  - 9/27/24 PSMA PET showing disease progression. Consult to Rad Onc.  PSA 8.89.   - 10/23/24-PSA 18.40  - 11/13/24: Pluvicto started, given every 6 weeks. Managed by [facility] Rad Onc team.  - 12/16/24- PSA 35.00  - 1/7/25: Cycle 2 Pluvicto to be given later today. Pt has Rad Onc BAYLEE fi/u for following labs and PSA.  - 1/24/25- PSA 68.00  - 2/5/25: Proceed Eligard shot today, along with Zometa.. Continues on darolutamide 600 mg BID.  - 4/1/25: Received cycle 4 Pluvicto. PSA is 84.90 on 4/9/2025.  Clear progression on PSMA PET from 4/8 with numerous new PSMA avid lesions.    - 5/5/25- Second opinion at [facility].  -5/19/25- [id number] Phase 3 consent signed.  Screening initiated.  Stop Darolutamide.  Reduce prednisone to 5mg daily x7 days and then 5mg every other day.    -6/17/25-C1D1 [id number] 0.1mg.  Tolerated without issues.   -6/24/25-C1D8 [id number] 0.3mg in CRU.    -7/1/25-C1D15 [id number] 1mg in CRU.  PSA 54.00 pre-infusion.     -7/8/25-C1D22 [id number] 1.5mg in CRU.  PSA 15.80 pre-infusion.    -7/14/25- C2D1 [id number] 1.5mg in [facility], PSA 8.37 pre-infusion       ALLERGIES: No known drug allergies     MEDS:  Current Outpatient Medications   Medication Sig Dispense Refill    acetaminophen (TYLENOL) 500 MG tablet Take 2 tablets (1,000 mg) by mouth every 8 hours as needed for mild pain (Post xaluritamig dose for 5 days.). Take 3 times a day for the first 5 days after xaluritamig dose and if fever or pain. Maximum 4000 mg in 24 hours. 240 tablet 5    calcium carbonate 500 mg, elemental, (OSCAL 500) 1250 (500 Ca) MG TABS tablet Take 500 mg by mouth daily.      ciprofloxacin (CIPRO) 500 MG tablet Take 1 tablet (500 mg) by mouth 2 times daily as needed (Take only if instructed to do so by Dr. Boss or the [facility] team for fevers.). 14 tablet 0    dexAMETHasone  (DECADRON) 4 MG tablet Take 2 tablets (8 mg) by mouth See Admin Instructions. Take 2 tablets (8 mg) the evening (approximately 12-16 hours) prior to the first 4 xaluritamig doses. Record time taken. 30 tablet 5    famotidine (PEPCID) 20 MG tablet Take 1 tablet (20 mg) by mouth 2 times daily as needed (Take when taking ibuprofen). 180 tablet 2    ibuprofen (ADVIL/MOTRIN) 200 MG tablet Take 3 tablets (600 mg) by mouth every 6 hours as needed for pain (muscle aches). 300 tablet 1    loratadine (CLARITIN) 10 MG tablet Take 10 mg by mouth daily.      methocarbamol (ROBAXIN) 750 MG tablet Take 1 tablet (750 mg) by mouth 3 times daily as needed for muscle spasms. 90 tablet 5    metroNIDAZOLE (FLAGYL) 500 MG tablet Take 1 tablet (500 mg) by mouth 3 times daily as needed (Take only if instructed to do so by Dr. Boss or the [facility] team for fevers.). 21 tablet 0    venlafaxine (EFFEXOR XR) 150 MG 24 hr capsule Take 150 mg by mouth every evening.      vitamin D3 (CHOLECALCIFEROL) 50 mcg (2000 units) tablet Take 1 tablet by mouth 2 times daily.      vitamin E (TOCOPHEROL) 400 units (180 mg) capsule Take 400 Units by mouth daily.       No current facility-administered medications for this visit.       PHYSICAL EXAM:    ECOG-PS=1     General: The patient is a pleasant male in no acute distress.  /78   Pulse 89   Temp 98.7  F (37.1  C) (Oral)   Resp 16   Wt 120.2 kg (265 lb)   SpO2 100%   BMI 36.49 kg/m    Wt Readings from Last 10 Encounters:   07/28/25 120.2 kg (265 lb)   07/14/25 122 kg (268 lb 14.4 oz)   07/08/25 120.8 kg (266 lb 5.1 oz)   07/01/25 119.9 kg (264 lb 5.3 oz)   06/24/25 121.7 kg (268 lb 4.8 oz)   06/17/25 124.1 kg (273 lb 11.2 oz)   05/19/25 120.4 kg (265 lb 8 oz)   05/05/25 121.1 kg (267 lb)   General: No acute distress  HEENT: Sclera anicteric. Oral mucosa pink and moist.  No mucositis or thrush  Lymph: No lymphadenopathy in neck  Heart: Regular, rate, and rhythm  Lungs: Clear to ascultation  "bilaterally  Abdomen: Positive bowel sounds. Soft, non-distended, non-tender. No organomegaly or mass.   Extremities: no lower extremity edema  Neuro: Cranial nerves grossly intact  Rash: none     Not done July 28, 2025:   Level of consciousness alert   Orientation {time, place, and person  Vision no vision changes  Cranial nerves and brain stem functions Normal   Pyramidal and extra pyramidal motor system reflexes Normal  Muscle tone and trophic findings Normal  Coordination Finger to nose test showed normal findings   Heel to shin test showed normal findings  Normal rapid alternating movements and no pronator drift   Sensory system decreased at baseline in R 5th finger sensation  neuropsychological findings (eg, speech, cognition, and emotion). Normal     Cellular Therapy Toxicity Monitoring Note  Date: 07/08/2025   Wenceslao Finch ([id number]) is a 70 year old year old male who is currently day  of CAR-T cell therapy or bispecific antibody therapy.     1) CRS Grading (see table below for grading schema)  Fever:              </= 100.4  Blood pressure:              SBP >/= 90 (not hypotensive)  Oxygen saturation:               >/= 90% on room air (not hypoxic)     Current CRS stgstrstastdstest:st st1st 2) ICANS Grading (see table below for grading schema)     Patient is alert & oriented x4. There are signs of headache, muscle spasms or rigidity, or vision changes. No.     ICANS score/Neurotoxicity assessment  Score:  4   Orientation: Orientation to year, month, city, hospital: 4 points (1 point each)  3   Identify: Name 3 objects that provider points to (e.g. clock, pen, button): 3 points (1 point each)  1   Following Commands: (e.g. show me two fingers or close your eyes and stick out your tongue): 1 point  1   Writing: Ability to write a standard sentence (see writing page \"Our national bird is the bald eagle.\"): 1 point  1   Attention: Count backwards from 100 by 10s: 1 point     10   Total: Max 10     ICE Score: 0  (10 " = Grade 0; 7-9 = Grade 1; 3-6 = Grade 2; 0-2 = Grade 3; 0 = Grade 4)     Seizure              No seizures     Motor Findings              No motor findings     Elevated ICP/Cerebral Edema              None     Current ICANS stgstrstastdstest:st st1st Table 1: CRS Grading per ASTCT (https://doi.org/10.1016/j.bbmt.2018.12.758)       Tabe 2: Neurotoxicity Grading per ASTCT (https://doi.org/10.1016/j.bbmt.2018.12.758)          Labs and Imaging:       Most Recent 3 CBC's:  Recent Labs   Lab Test 07/14/25  1239 07/08/25  0853 07/01/25  1017   WBC 15.3* 13.8* 12.4*   HGB 9.8* 10.4* 11.4*   MCV 92 90 93    312 327   ANEU 13.7* 13.1* 11.4*     Most Recent 3 BMP's:  Recent Labs   Lab Test 07/14/25  1239 07/08/25  0853 07/01/25  1017   * 132* 133*   POTASSIUM 4.6 4.4 4.4   CHLORIDE 100 98 99   CO2 22 20* 20*   BUN 19.9 17.8 21.3   CR 0.89 1.09 1.12   ANIONGAP 11 14 14   EARL 9.3 9.5 9.4   * 208* 200*   PROTTOTAL 6.8 7.1 7.0   ALBUMIN 3.4* 3.3* 3.6    Most Recent 3 LFT's:  Recent Labs   Lab Test 07/14/25  1239 07/08/25  0853 07/01/25  1017   AST 19 17 17   ALT 17 15 12   ALKPHOS 67 59 54   BILITOTAL <0.2 0.2 0.2    Most Recent 2 TSH and T4:No lab results found.     Latest Reference Range & Units 06/16/25 10:55 06/17/25 04:47 06/24/25 09:15 07/01/25 10:17 07/08/25 08:53 07/14/25 12:39   CRP Inflammation <5.00 mg/L 8.81 (H)  13.10 (H) 59.10 (H) 329.00 (H) 155.00 (H)   Ferritin 31 - 409 ng/mL 272 299 355 438 (H) 801 (H) 786 (H)   Glucose 70 - 99 mg/dL 165 (H) 130 (H) 143 (H) 200 (H) 208 (H) 144 (H)       PSA Tumor Marker   Date Value Ref Range Status   07/14/2025 8.37 (H) 0.00 - 4.50 ng/mL Final   07/08/2025 15.80 (H) 0.00 - 4.50 ng/mL Final   07/01/2025 54.00 (H) 0.00 - 4.50 ng/mL Final   06/16/2025 305.00 (H) 0.00 - 4.50 ng/mL Final       I reviewed the above labs today.       IMPRESSION AND PLAN:     # metastatic castration resistant prostate cancer  Burt was referred for second opinion regarding potential clinical  trials and was initially seen on May 5, 2025.  He was initially diagnosed with Yarelis 4+3 equal 7 prostate adenocarcinoma in 2016 underwent radical prostatectomy at that time.  PSA became detectable again in 2018 and he was initiated on ADT for 2 years total, which was then held.  PSA began to rise approximately 4 months later (November 2020) after holding ADT when his PSA was 7.  He was restarted on ADT with Dr. Caro in February 2021 and continued on ADT, notably his PSA began to rise significantly in January 2022 rising from 26.8-40.  He was started on enzalutamide in January 2022 for nonmetastatic prostate cancer that was hormone resistant.  His first metastasis appeared on nuclear medicine bone scan in 2023 in the left mid sternum, prompting switch to abiraterone 1000 mg daily along with prednisone.  His PSA continued to slowly rise through January 2024 when he was started on docetaxel in the CRPC setting.  Cycle 1 day 1 of docetaxel was January 17, 2024 when his PSA was 18 he was started on Pluvicto at [place] [facility] in November 2024 and completed 4 total cycles before restaging on April 8, 2025.  That PSMA PET scan was notable for numerous new PSMA avid lesions and a PSA of 84, demonstrating clear progression while on Pluvicto and after the typical expected timeframe for treatment related flare.     He signed consent for the [id number] and was randomized to the study drug (xaluritamig) arm.  -C1D1 on 6/16/25 at 0.1mg and tolerated without issues.    -C1D8 on 6/24 at 0.3mg.  Muscle aches and sore throat ~4 days after infusion, resolved after 2 tylenol and ibuprofen.    -C1D15 on 7/1/25 at 1mg.  Fever overnight C1D20 to 100.9F.  No aches.  Rash developed on C1D19 and improved with OTC hydrocortisone.    -B3Y76-5.5mg administered.  PSA with 95% reduction compared to pre-treatment baseline.  Continue OTC hydrocortisone cream at prior sites of rash.   -C2D1 - 1.5mg administered on 7/14/25. Tolerated well. Had one  fever after tapering off of ibuprofen/tylenol. Continued tylenol with resolution.      PLAN:   Cycle 2D14 to be given today as scheduled with no evening dexamethasone prior. He will get tylenol 650 mg with premedications and 4 mg dexamethasone.    Please continue to take tylenol 1000mg every 6 hours for 24 hours AFTER discharge (4 doses in total).   Once you are done with that 24 hours of required tylenol, I would decrease to 1000mg every 8 hours of tylenol and 600mg every 6 hours of ibuprofen even if you are feeling well for the first week after infusion.   After one week, pull back on ibuprofen and then if no recurrent symptoms can discontinue tylenol 24 hours later.  Continue to aggressively hydrate with 11 cups of water or non-caffeinated fluids daily  Monitor your blood pressure and temperature.  Let us know right away if you have a fever of 100.4F or higher, blood pressure with top number less than 100, or oxygen level less than 90%. During the day reach out to the study team. After hours and weekends, call Dr. Boss directly at [phone] regardless of the time of day.  If you take ibuprofen, you need to take this with famotidine to protect the lining of your stomach.  Let me know if you get worsening pain or symptoms around your fistula.  Continue hydrocortisone cream to areas of rash if it returns. Let us know if rash recurs.     Keep your home action kit with you if you leave home for more than 2-3 hours.  For routine updates (no fever, low blood pressure, or low oxygen) you can give us updates in MyChart.    28 minutes spent on the date of the encounter doing chart review, review of test results, interpretation of tests, patient visit, and documentation     Seen with Yolis Pittman RN . Discussed plan with Dr. Boss.     The longitudinal plan of care for the diagnosis(es)/condition(s) as documented were addressed during this visit. Due to the added complexity in care, I will continue to  support Wenceslao in the subsequent management and with ongoing continuity of care.    Nataliya Vann PA-C                 Again, thank you for allowing me to participate in the care of your patient.        Sincerely,        Nataliya Vann PA-C    Electronically signed

## 2025-07-28 NOTE — NURSING NOTE
"Oncology Rooming Note    July 28, 2025 11:44 AM   Wenceslao Finhc is a 70 year old male who presents for:    Chief Complaint   Patient presents with    Blood Draw     Port blood draw by lab RN    Oncology Clinic Visit     Hormone resistant prostate CA     Initial Vitals: /78   Pulse 89   Temp 98.7  F (37.1  C) (Oral)   Resp 16   Wt 120.2 kg (265 lb)   SpO2 100%   BMI 36.49 kg/m   Estimated body mass index is 36.49 kg/m  as calculated from the following:    Height as of 6/16/25: 1.815 m (5' 11.46\").    Weight as of this encounter: 120.2 kg (265 lb). Body surface area is 2.46 meters squared.  No Pain (0) Comment: Data Unavailable   No LMP for male patient.  Allergies reviewed: Yes  Medications reviewed: Yes    Medications: Medication refills not needed today.  Pharmacy name entered into [facility]: [id number] - [place], [place] - [phone] [address]    PHQ9:  Did this patient require a PHQ9?: No      Clinical concerns:        Jeanne Coronel, Visit Facilitator              "

## 2025-07-28 NOTE — PROGRESS NOTES
Infusion Nursing Note:  Wenceslao Finch presents today for Cycle 2, day 15 study - xaluritamig () (IDS# [id number])  Patient seen by provider today: Yes: Nataliya Vann CNP   present during visit today: Not Applicable.    Note:     Per research staff Yolis Mast OK to proceed with study treatment today.      The following items were completed per protocol as directed by research study staff:      - Vital signs taken prior to infusion within 30 minutes and post infusion within 5 minutes.   - Pre-medications, tylenol & dexamethasone, given within 1 hour prior to study drug (administered at 1329)  - Research kit labs were taken within 30 minutes prior to infusion and post infusion.  Port used for lab draws and infusion today, confirmed with Yolis this was ok to use the same line.      study - xaluritamig () (IDS# [id number]) Start Time: 1335   Stop Time: 1430    Stop time with flush: 1438    Patient afebrile with no signs of neurotoxicity, muscle/joint pain or weakness, and no swelling at discharge.     Intravenous Access:  Implanted Port.    Treatment Conditions:  Not Applicable.      Post Infusion Assessment:  Patient tolerated infusion without incident.  Blood return noted pre and post infusion.  Site patent and intact, free from redness, edema or discomfort.  No evidence of extravasations.  Access discontinued per protocol.       Discharge Plan:   Patient declined prescription refills.  Discharge instructions reviewed with: Patient and Family.  Patient and/or family verbalized understanding of discharge instructions and all questions answered.  AVS to patient via [employer].  Patient will return 8/13/25 for next appointment.   Patient discharged in stable condition accompanied by: self and wife.  Departure Mode: Ambulatory.      Vanda Barone RN

## 2025-07-28 NOTE — PROGRESS NOTES
[id number] Ph3 for mCRPC: Study Visit Note   Subject name: Wenceslao Finch     Visit: Cycle 2 Day 15    Did the study visit occur within the appropriate window allowed by the protocol? yes    Patient is accompanied by his wife Nico at today's visit. Since the last study visit, He has been doing well. Following the trend of his previous cycles, he developed a fever , Temp of 100.6, along with increased fatigue. No other fever, chills, nausea,or dizziness. Patient otherwise reports a constant level of fatigue. The fatigue is not keeping him from doing things, however, he stated that he feels like he could take a nap at any time. Burt also reported some muscle aches and soreness after his latest physical therapy session, where he did push himself more than normal. Aches resolved normally. Denied any shoulder pain. Patient's dysgeusia is ongoing, reported a sensitive tongue especially after eating acidic/vinegar based dressings. Tip of tongue sometimes painful.   Patient continues to take 1000mg acetaminophen TID + 400mg ibuprofen BID foe the week after his infusion. Patient is not currently using methocarbamol.     ePro Questionnaires completed via tablet     I have personally interviewed Wenceslao Finch and reviewed his medical record for adverse events and concomitant medications and these have been recorded on the corresponding logs in Wenceslao Finch's research file.     Special considerations for today's visit were reviewed with staff administering the study intervention includinmg Dexamethasone + 650 Tylenol Pre-Meds (within 1 hour of admin) > pre infusion vitals w/in 30 mins of admin > pre-infusion research kit with 30 mins of admin > drug administration over 60 mins +/- 10mins >Normal Saline Flush (sent with drug, must be given in under 30 minutes)> end of infusion vitals > end of infusion research kit > No Post-meds. Please ensure to record the start and stop times of both the  Study Drug and flush. And please record vitals in the EMR.     Wenceslao Finch was given the opportunity to ask any trial related questions.  Please see provider progress note for physical exam and other clinical information. Labs were reviewed - any significant lab values were addressed and reviewed.    Yolis Pittman   [facility]  [facility]

## 2025-07-28 NOTE — NURSING NOTE
Chief Complaint   Patient presents with    Blood Draw     Port blood draw by lab RN       Port accessed with blood draw and heparin flush by lab RN. Vitals taken and next appointment arrived.    No research kit provided.  Lou Gonzalez RN

## 2025-07-28 NOTE — PATIENT INSTRUCTIONS
[facility] Triage and after hours / weekends / holidays:  [phone]    Please call the triage or after hours line if you experience a temperature greater than or equal to 100.4, shaking chills, have uncontrolled nausea, vomiting and/or diarrhea, dizziness, shortness of breath, chest pain, bleeding, unexplained bruising, or if you have any other new/concerning symptoms, questions or concerns.      If you are having any concerning symptoms or wish to speak to a provider before your next infusion visit, please call triage to notify them so we can adequately serve you.     If you need a refill on a narcotic prescription or other medication, please call before your infusion appointment.             July 2025 Sunday Monday Tuesday Wednesday Thursday Friday Saturday             1    Research   8:30 AM   (480 min.)   Servando Boss MD   [facility] 2     3     4     5       6     7     8    Research   8:45 AM   (480 min.)   Servando Boss MD   [facility] 9     10     11     12       13     14    Lab Central  12:45 PM   (15 min.)   [facility]   [facility] [place]    Return Patient   1:15 PM   (45 min.)   Viridiana Nelson APRN CNP   [facility]    Infusion 150   2:30 PM   (150 min.)    ONC INFUSION NURSE   [facility] 15     16     17     18     19       20     21     22     23     24     25     26       27     28    Lab Central  10:45 AM   (15 min.)   [facility]   [facility]    Return Patient  11:15 AM   (45 min.)   Nataliya Vann PA-C   [facility]    Infusion 150   1:30 PM   (150 min.)    ONC INFUSION NURSE   [facility] 29 30 31 August 2025 Sunday Monday Tuesday Wednesday Thursday Friday Saturday                             1     2       3     4     5    NM INJECT   7:45 AM   (15 min.)   [id number]   [facility]    CT CHEST/ABDOMEN/PELVIS W CONTRAST   8:10 AM   (20 min.)   [id number]   [facility] 6     7     8     9       10     11     12     13    Lab Central   6:15 AM   (15 min.)   [facility]   [facility]    Return Patient   6:45 AM   (45 min.)   Viridiana Nelson APRN CNP   [facility]    Infusion 150   8:00 AM   (150 min.)   UC ONC INFUSION NURSE   [facility] 14     15     16       17     18     19     20     21     22     23       24     25    Lab Central   7:15 AM   (15 min.)   [facility]   [facility]    Return Patient   7:45 AM   (30 min.)   Servando Boss MD   [facility]    Infusion 150   8:30 AM   (150 min.)   UC ONC INFUSION NURSE   [facility] 26     27     28     29     30       31                                                      Recent Results (from the past 24 hours)   Ferritin    Collection Time: 07/28/25 11:30 AM   Result Value Ref Range    Ferritin 706 (H) 31 - 409 ng/mL   PSA tumor marker    Collection Time: 07/28/25 11:30 AM   Result Value Ref Range    PSA Tumor Marker 11.30 (H) 0.00 - 4.50 ng/mL   Lactate Dehydrogenase    Collection Time: 07/28/25 11:30 AM   Result Value Ref Range    Lactate Dehydrogenase 155 0 - 250 U/L

## 2025-08-05 ENCOUNTER — HOSPITAL ENCOUNTER (OUTPATIENT)
Dept: NUCLEAR MEDICINE | Facility: CLINIC | Age: 70
Setting detail: NUCLEAR MEDICINE
Discharge: HOME | End: 2025-08-05
Attending: STUDENT IN AN ORGANIZED HEALTH CARE EDUCATION/TRAINING PROGRAM
Payer: COMMERCIAL

## 2025-08-05 ENCOUNTER — HOSPITAL ENCOUNTER (OUTPATIENT)
Dept: CT IMAGING | Facility: CLINIC | Age: 70
Discharge: HOME | End: 2025-08-05
Attending: STUDENT IN AN ORGANIZED HEALTH CARE EDUCATION/TRAINING PROGRAM
Payer: COMMERCIAL

## 2025-08-05 ENCOUNTER — ANCILLARY PROCEDURE (OUTPATIENT)
Facility: CLINIC | Age: 70
End: 2025-08-05
Attending: STUDENT IN AN ORGANIZED HEALTH CARE EDUCATION/TRAINING PROGRAM
Payer: COMMERCIAL

## 2025-08-05 DIAGNOSIS — C79.51 PROSTATE CANCER METASTATIC TO BONE (H): ICD-10-CM

## 2025-08-05 DIAGNOSIS — C61 HORMONE RESISTANT PROSTATE CANCER (H): ICD-10-CM

## 2025-08-05 DIAGNOSIS — C61 METASTATIC CASTRATION-RESISTANT ADENOCARCINOMA OF PROSTATE (H): ICD-10-CM

## 2025-08-05 DIAGNOSIS — C61 PROSTATE CANCER METASTATIC TO BONE (H): ICD-10-CM

## 2025-08-05 DIAGNOSIS — Z19.2 METASTATIC CASTRATION-RESISTANT ADENOCARCINOMA OF PROSTATE (H): ICD-10-CM

## 2025-08-05 DIAGNOSIS — Z19.2 HORMONE RESISTANT PROSTATE CANCER (H): ICD-10-CM

## 2025-08-05 PROCEDURE — 78306 BONE IMAGING WHOLE BODY: CPT

## 2025-08-05 PROCEDURE — 74177 CT ABD & PELVIS W/CONTRAST: CPT

## 2025-08-05 PROCEDURE — 343N000001 HC RX 343 MED OP 636: Performed by: STUDENT IN AN ORGANIZED HEALTH CARE EDUCATION/TRAINING PROGRAM

## 2025-08-05 PROCEDURE — A9503 TC99M MEDRONATE: HCPCS | Performed by: STUDENT IN AN ORGANIZED HEALTH CARE EDUCATION/TRAINING PROGRAM

## 2025-08-05 PROCEDURE — 250N000011 HC RX IP 250 OP 636: Performed by: STUDENT IN AN ORGANIZED HEALTH CARE EDUCATION/TRAINING PROGRAM

## 2025-08-05 RX ORDER — TC 99M MEDRONATE 20 MG/10ML
25 INJECTION, POWDER, LYOPHILIZED, FOR SOLUTION INTRAVENOUS ONCE
Status: COMPLETED | OUTPATIENT
Start: 2025-08-05 | End: 2025-08-05

## 2025-08-05 RX ORDER — IOPAMIDOL 755 MG/ML
135 INJECTION, SOLUTION INTRAVASCULAR ONCE
Status: COMPLETED | OUTPATIENT
Start: 2025-08-05 | End: 2025-08-05

## 2025-08-05 RX ADMIN — TC 99M MEDRONATE 24.3 MILLICURIE: 20 INJECTION, POWDER, LYOPHILIZED, FOR SOLUTION INTRAVENOUS at 08:18

## 2025-08-05 RX ADMIN — IOPAMIDOL 135 ML: 755 INJECTION, SOLUTION INTRAVENOUS at 08:56

## 2025-08-13 ENCOUNTER — ALLIED HEALTH/NURSE VISIT (OUTPATIENT)
Dept: ONCOLOGY | Facility: CLINIC | Age: 70
End: 2025-08-13

## 2025-08-13 ENCOUNTER — ONCOLOGY VISIT (OUTPATIENT)
Dept: ONCOLOGY | Facility: CLINIC | Age: 70
End: 2025-08-13
Attending: STUDENT IN AN ORGANIZED HEALTH CARE EDUCATION/TRAINING PROGRAM
Payer: COMMERCIAL

## 2025-08-13 ENCOUNTER — LAB (OUTPATIENT)
Dept: LAB | Facility: CLINIC | Age: 70
End: 2025-08-13
Attending: STUDENT IN AN ORGANIZED HEALTH CARE EDUCATION/TRAINING PROGRAM
Payer: COMMERCIAL

## 2025-08-13 VITALS
RESPIRATION RATE: 16 BRPM | HEART RATE: 102 BPM | TEMPERATURE: 98.3 F | DIASTOLIC BLOOD PRESSURE: 84 MMHG | OXYGEN SATURATION: 98 % | BODY MASS INDEX: 36.85 KG/M2 | WEIGHT: 267.6 LBS | SYSTOLIC BLOOD PRESSURE: 138 MMHG

## 2025-08-13 VITALS
HEART RATE: 80 BPM | DIASTOLIC BLOOD PRESSURE: 84 MMHG | OXYGEN SATURATION: 100 % | RESPIRATION RATE: 18 BRPM | BODY MASS INDEX: 36.68 KG/M2 | SYSTOLIC BLOOD PRESSURE: 142 MMHG | WEIGHT: 266.4 LBS | TEMPERATURE: 98.4 F

## 2025-08-13 DIAGNOSIS — C61 PROSTATE CANCER METASTATIC TO BONE (H): Primary | ICD-10-CM

## 2025-08-13 DIAGNOSIS — C79.51 PROSTATE CANCER METASTATIC TO BONE (H): Primary | ICD-10-CM

## 2025-08-13 DIAGNOSIS — Z19.2 METASTATIC CASTRATION-RESISTANT ADENOCARCINOMA OF PROSTATE (H): Primary | ICD-10-CM

## 2025-08-13 DIAGNOSIS — Z19.2 HORMONE RESISTANT PROSTATE CANCER (H): Primary | ICD-10-CM

## 2025-08-13 DIAGNOSIS — C61 METASTATIC CASTRATION-RESISTANT ADENOCARCINOMA OF PROSTATE (H): Primary | ICD-10-CM

## 2025-08-13 DIAGNOSIS — C61 HORMONE RESISTANT PROSTATE CANCER (H): Primary | ICD-10-CM

## 2025-08-13 LAB
ALBUMIN SERPL BCG-MCNC: 3.7 G/DL (ref 3.5–5.2)
ALP SERPL-CCNC: 55 U/L (ref 40–150)
ALT SERPL W P-5'-P-CCNC: 11 U/L (ref 0–70)
ANION GAP SERPL CALCULATED.3IONS-SCNC: 15 MMOL/L (ref 7–15)
AST SERPL W P-5'-P-CCNC: 18 U/L (ref 0–45)
BASOPHILS # BLD AUTO: 0.06 10E3/UL (ref 0–0.2)
BASOPHILS NFR BLD AUTO: 0.8 %
BILIRUB SERPL-MCNC: 0.2 MG/DL
BUN SERPL-MCNC: 17 MG/DL (ref 8–23)
CALCIUM SERPL-MCNC: 9.4 MG/DL (ref 8.8–10.4)
CHLORIDE SERPL-SCNC: 100 MMOL/L (ref 98–107)
CK SERPL-CCNC: 58 U/L (ref 39–308)
CREAT SERPL-MCNC: 1.01 MG/DL (ref 0.67–1.17)
CRP SERPL-MCNC: 9.93 MG/L
EGFRCR SERPLBLD CKD-EPI 2021: 81 ML/MIN/1.73M2
EOSINOPHIL # BLD AUTO: 1.11 10E3/UL (ref 0–0.7)
EOSINOPHIL NFR BLD AUTO: 15.1 %
ERYTHROCYTE [DISTWIDTH] IN BLOOD BY AUTOMATED COUNT: 15.3 % (ref 10–15)
FERRITIN SERPL-MCNC: 592 NG/ML (ref 31–409)
GLUCOSE SERPL-MCNC: 121 MG/DL (ref 70–99)
HCO3 SERPL-SCNC: 21 MMOL/L (ref 22–29)
HCT VFR BLD AUTO: 28.3 % (ref 40–53)
HGB BLD-MCNC: 9.1 G/DL (ref 13.3–17.7)
IMM GRANULOCYTES # BLD: 0.06 10E3/UL
IMM GRANULOCYTES NFR BLD: 0.8 %
LYMPHOCYTES # BLD AUTO: 1.04 10E3/UL (ref 0.8–5.3)
LYMPHOCYTES NFR BLD AUTO: 14.1 %
MAGNESIUM SERPL-MCNC: 2.2 MG/DL (ref 1.7–2.3)
MCH RBC QN AUTO: 29.3 PG (ref 26.5–33)
MCHC RBC AUTO-ENTMCNC: 32.2 G/DL (ref 31.5–36.5)
MCV RBC AUTO: 91 FL (ref 78–100)
MONOCYTES # BLD AUTO: 0.91 10E3/UL (ref 0–1.3)
MONOCYTES NFR BLD AUTO: 12.4 %
NEUTROPHILS # BLD AUTO: 4.18 10E3/UL (ref 1.6–8.3)
NEUTROPHILS NFR BLD AUTO: 56.8 %
NRBC # BLD AUTO: 0 10E3/UL
NRBC BLD AUTO-RTO: 0 /100
PHOSPHATE SERPL-MCNC: 4.2 MG/DL (ref 2.5–4.5)
PLATELET # BLD AUTO: 356 10E3/UL (ref 150–450)
POTASSIUM SERPL-SCNC: 4 MMOL/L (ref 3.4–5.3)
PROT SERPL-MCNC: 6.6 G/DL (ref 6.4–8.3)
PSA SERPL DL<=0.01 NG/ML-MCNC: 7.81 NG/ML (ref 0–4.5)
RBC # BLD AUTO: 3.11 10E6/UL (ref 4.4–5.9)
RETICS # AUTO: 0.09 10E6/UL (ref 0.03–0.1)
RETICS/RBC NFR AUTO: 3.05 % (ref 0.5–2)
SODIUM SERPL-SCNC: 136 MMOL/L (ref 135–145)
URATE SERPL-MCNC: 5.5 MG/DL (ref 3.4–7)
WBC # BLD AUTO: 7.36 10E3/UL (ref 4–11)

## 2025-08-13 PROCEDURE — 258N000003 HC RX IP 258 OP 636

## 2025-08-13 PROCEDURE — 84100 ASSAY OF PHOSPHORUS: CPT | Performed by: STUDENT IN AN ORGANIZED HEALTH CARE EDUCATION/TRAINING PROGRAM

## 2025-08-13 PROCEDURE — 300N000004 RESEARCH KIT COLLECTION: Performed by: STUDENT IN AN ORGANIZED HEALTH CARE EDUCATION/TRAINING PROGRAM

## 2025-08-13 PROCEDURE — G0463 HOSPITAL OUTPT CLINIC VISIT: HCPCS

## 2025-08-13 PROCEDURE — 300N000004 RESEARCH KIT COLLECTION

## 2025-08-13 PROCEDURE — 250N000011 HC RX IP 250 OP 636

## 2025-08-13 PROCEDURE — 83735 ASSAY OF MAGNESIUM: CPT | Performed by: STUDENT IN AN ORGANIZED HEALTH CARE EDUCATION/TRAINING PROGRAM

## 2025-08-13 PROCEDURE — 84550 ASSAY OF BLOOD/URIC ACID: CPT | Performed by: STUDENT IN AN ORGANIZED HEALTH CARE EDUCATION/TRAINING PROGRAM

## 2025-08-13 PROCEDURE — 36591 DRAW BLOOD OFF VENOUS DEVICE: CPT | Performed by: STUDENT IN AN ORGANIZED HEALTH CARE EDUCATION/TRAINING PROGRAM

## 2025-08-13 PROCEDURE — 85004 AUTOMATED DIFF WBC COUNT: CPT | Performed by: STUDENT IN AN ORGANIZED HEALTH CARE EDUCATION/TRAINING PROGRAM

## 2025-08-13 PROCEDURE — 82550 ASSAY OF CK (CPK): CPT | Performed by: STUDENT IN AN ORGANIZED HEALTH CARE EDUCATION/TRAINING PROGRAM

## 2025-08-13 PROCEDURE — 84153 ASSAY OF PSA TOTAL: CPT

## 2025-08-13 PROCEDURE — 36415 COLL VENOUS BLD VENIPUNCTURE: CPT

## 2025-08-13 PROCEDURE — 82310 ASSAY OF CALCIUM: CPT | Performed by: STUDENT IN AN ORGANIZED HEALTH CARE EDUCATION/TRAINING PROGRAM

## 2025-08-13 PROCEDURE — 36415 COLL VENOUS BLD VENIPUNCTURE: CPT | Performed by: STUDENT IN AN ORGANIZED HEALTH CARE EDUCATION/TRAINING PROGRAM

## 2025-08-13 PROCEDURE — 86140 C-REACTIVE PROTEIN: CPT | Performed by: STUDENT IN AN ORGANIZED HEALTH CARE EDUCATION/TRAINING PROGRAM

## 2025-08-13 PROCEDURE — 85045 AUTOMATED RETICULOCYTE COUNT: CPT | Performed by: STUDENT IN AN ORGANIZED HEALTH CARE EDUCATION/TRAINING PROGRAM

## 2025-08-13 PROCEDURE — 82728 ASSAY OF FERRITIN: CPT | Performed by: STUDENT IN AN ORGANIZED HEALTH CARE EDUCATION/TRAINING PROGRAM

## 2025-08-13 PROCEDURE — 250N000013 HC RX MED GY IP 250 OP 250 PS 637

## 2025-08-13 RX ORDER — IBUPROFEN 200 MG
400 TABLET ORAL EVERY 8 HOURS PRN
Status: CANCELLED | OUTPATIENT
Start: 2025-08-13

## 2025-08-13 RX ORDER — MEPERIDINE HYDROCHLORIDE 25 MG/ML
25 INJECTION INTRAMUSCULAR; INTRAVENOUS; SUBCUTANEOUS
Status: CANCELLED | OUTPATIENT
Start: 2025-08-13

## 2025-08-13 RX ORDER — LORAZEPAM 2 MG/ML
0.5 INJECTION INTRAMUSCULAR EVERY 4 HOURS PRN
Status: CANCELLED | OUTPATIENT
Start: 2025-08-13

## 2025-08-13 RX ORDER — DIPHENHYDRAMINE HYDROCHLORIDE 50 MG/ML
50 INJECTION, SOLUTION INTRAMUSCULAR; INTRAVENOUS
Status: CANCELLED
Start: 2025-08-13

## 2025-08-13 RX ORDER — ALBUTEROL SULFATE 90 UG/1
1-2 INHALANT RESPIRATORY (INHALATION)
Status: CANCELLED
Start: 2025-08-13

## 2025-08-13 RX ORDER — HEPARIN SODIUM (PORCINE) LOCK FLUSH IV SOLN 100 UNIT/ML 100 UNIT/ML
5 SOLUTION INTRAVENOUS
Status: CANCELLED | OUTPATIENT
Start: 2025-08-13

## 2025-08-13 RX ORDER — DIPHENHYDRAMINE HYDROCHLORIDE 50 MG/ML
25 INJECTION, SOLUTION INTRAMUSCULAR; INTRAVENOUS
Status: CANCELLED
Start: 2025-08-13

## 2025-08-13 RX ORDER — METHYLPREDNISOLONE SODIUM SUCCINATE 40 MG/ML
40 INJECTION INTRAMUSCULAR; INTRAVENOUS
Status: CANCELLED
Start: 2025-08-13

## 2025-08-13 RX ORDER — ACETAMINOPHEN 325 MG/1
650 TABLET ORAL ONCE
Status: CANCELLED
Start: 2025-08-13 | End: 2025-08-13

## 2025-08-13 RX ORDER — FAMOTIDINE 20 MG/1
20 TABLET, FILM COATED ORAL EVERY 12 HOURS PRN
Status: CANCELLED | OUTPATIENT
Start: 2025-08-13

## 2025-08-13 RX ORDER — ACETAMINOPHEN 325 MG/1
650 TABLET ORAL ONCE
Status: COMPLETED | OUTPATIENT
Start: 2025-08-13 | End: 2025-08-13

## 2025-08-13 RX ORDER — HEPARIN SODIUM (PORCINE) LOCK FLUSH IV SOLN 100 UNIT/ML 100 UNIT/ML
5 SOLUTION INTRAVENOUS
Status: DISCONTINUED | OUTPATIENT
Start: 2025-08-13 | End: 2025-08-13 | Stop reason: HOSPADM

## 2025-08-13 RX ORDER — HEPARIN SODIUM (PORCINE) LOCK FLUSH IV SOLN 100 UNIT/ML 100 UNIT/ML
500 SOLUTION INTRAVENOUS ONCE
Status: COMPLETED | OUTPATIENT
Start: 2025-08-13 | End: 2025-08-13

## 2025-08-13 RX ORDER — EPINEPHRINE 1 MG/ML
0.3 INJECTION, SOLUTION INTRAMUSCULAR; SUBCUTANEOUS EVERY 5 MIN PRN
Status: CANCELLED | OUTPATIENT
Start: 2025-08-13

## 2025-08-13 RX ORDER — HEPARIN SODIUM,PORCINE 10 UNIT/ML
5-20 VIAL (ML) INTRAVENOUS DAILY PRN
Status: CANCELLED | OUTPATIENT
Start: 2025-08-13

## 2025-08-13 RX ORDER — DEXAMETHASONE SODIUM PHOSPHATE 10 MG/ML
8 INJECTION, SOLUTION INTRAMUSCULAR; INTRAVENOUS
Status: CANCELLED | OUTPATIENT
Start: 2025-08-13

## 2025-08-13 RX ORDER — ACETAMINOPHEN 500 MG
1000 TABLET ORAL EVERY 6 HOURS PRN
Status: CANCELLED | OUTPATIENT
Start: 2025-08-13

## 2025-08-13 RX ORDER — ALBUTEROL SULFATE 0.83 MG/ML
2.5 SOLUTION RESPIRATORY (INHALATION)
Status: CANCELLED | OUTPATIENT
Start: 2025-08-13

## 2025-08-13 RX ADMIN — Medication 5 ML: at 10:13

## 2025-08-13 RX ADMIN — SODIUM CHLORIDE 250 ML: 0.9 INJECTION, SOLUTION INTRAVENOUS at 08:51

## 2025-08-13 RX ADMIN — ACETAMINOPHEN 650 MG: 325 TABLET ORAL at 08:28

## 2025-08-13 RX ADMIN — Medication 500 UNITS: at 06:29

## 2025-08-13 ASSESSMENT — PAIN SCALES - GENERAL: PAINLEVEL_OUTOF10: NO PAIN (0)

## 2025-08-25 ENCOUNTER — LAB (OUTPATIENT)
Dept: LAB | Facility: CLINIC | Age: 70
End: 2025-08-25
Attending: STUDENT IN AN ORGANIZED HEALTH CARE EDUCATION/TRAINING PROGRAM
Payer: COMMERCIAL

## 2025-08-25 ENCOUNTER — ONCOLOGY VISIT (OUTPATIENT)
Dept: ONCOLOGY | Facility: CLINIC | Age: 70
End: 2025-08-25
Attending: STUDENT IN AN ORGANIZED HEALTH CARE EDUCATION/TRAINING PROGRAM
Payer: COMMERCIAL

## 2025-08-25 ENCOUNTER — ALLIED HEALTH/NURSE VISIT (OUTPATIENT)
Dept: ONCOLOGY | Facility: CLINIC | Age: 70
End: 2025-08-25

## 2025-08-25 VITALS
HEART RATE: 79 BPM | TEMPERATURE: 97.7 F | OXYGEN SATURATION: 99 % | SYSTOLIC BLOOD PRESSURE: 122 MMHG | DIASTOLIC BLOOD PRESSURE: 73 MMHG | RESPIRATION RATE: 15 BRPM

## 2025-08-25 VITALS
SYSTOLIC BLOOD PRESSURE: 138 MMHG | DIASTOLIC BLOOD PRESSURE: 82 MMHG | WEIGHT: 266.5 LBS | HEART RATE: 90 BPM | TEMPERATURE: 100.8 F | OXYGEN SATURATION: 98 % | RESPIRATION RATE: 14 BRPM | BODY MASS INDEX: 36.7 KG/M2

## 2025-08-25 DIAGNOSIS — Z19.2 HORMONE RESISTANT PROSTATE CANCER (H): ICD-10-CM

## 2025-08-25 DIAGNOSIS — C61 METASTATIC CASTRATION-RESISTANT ADENOCARCINOMA OF PROSTATE (H): Primary | ICD-10-CM

## 2025-08-25 DIAGNOSIS — D64.81 ANTINEOPLASTIC CHEMOTHERAPY INDUCED ANEMIA: ICD-10-CM

## 2025-08-25 DIAGNOSIS — Z19.2 METASTATIC CASTRATION-RESISTANT ADENOCARCINOMA OF PROSTATE (H): Primary | ICD-10-CM

## 2025-08-25 DIAGNOSIS — C79.51 PROSTATE CANCER METASTATIC TO BONE (H): Primary | ICD-10-CM

## 2025-08-25 DIAGNOSIS — C61 HORMONE RESISTANT PROSTATE CANCER (H): ICD-10-CM

## 2025-08-25 DIAGNOSIS — T45.1X5A ANTINEOPLASTIC CHEMOTHERAPY INDUCED ANEMIA: ICD-10-CM

## 2025-08-25 DIAGNOSIS — Z19.2 HORMONE RESISTANT PROSTATE CANCER (H): Primary | ICD-10-CM

## 2025-08-25 DIAGNOSIS — C79.51 PROSTATE CANCER METASTATIC TO BONE (H): ICD-10-CM

## 2025-08-25 DIAGNOSIS — Z00.6 EXAMINATION OF PARTICIPANT IN CLINICAL TRIAL: ICD-10-CM

## 2025-08-25 DIAGNOSIS — C61 HORMONE RESISTANT PROSTATE CANCER (H): Primary | ICD-10-CM

## 2025-08-25 DIAGNOSIS — C61 PROSTATE CANCER METASTATIC TO BONE (H): Primary | ICD-10-CM

## 2025-08-25 DIAGNOSIS — C61 PROSTATE CANCER METASTATIC TO BONE (H): ICD-10-CM

## 2025-08-25 PROCEDURE — 96413 CHEMO IV INFUSION 1 HR: CPT

## 2025-08-25 PROCEDURE — 250N000011 HC RX IP 250 OP 636: Performed by: STUDENT IN AN ORGANIZED HEALTH CARE EDUCATION/TRAINING PROGRAM

## 2025-08-25 PROCEDURE — G0463 HOSPITAL OUTPT CLINIC VISIT: HCPCS | Performed by: STUDENT IN AN ORGANIZED HEALTH CARE EDUCATION/TRAINING PROGRAM

## 2025-08-25 PROCEDURE — 258N000003 HC RX IP 258 OP 636: Performed by: STUDENT IN AN ORGANIZED HEALTH CARE EDUCATION/TRAINING PROGRAM

## 2025-08-25 PROCEDURE — 250N000013 HC RX MED GY IP 250 OP 250 PS 637: Performed by: STUDENT IN AN ORGANIZED HEALTH CARE EDUCATION/TRAINING PROGRAM

## 2025-08-25 RX ORDER — HEPARIN SODIUM (PORCINE) LOCK FLUSH IV SOLN 100 UNIT/ML 100 UNIT/ML
5 SOLUTION INTRAVENOUS
Status: DISCONTINUED | OUTPATIENT
Start: 2025-08-25 | End: 2025-08-25 | Stop reason: HOSPADM

## 2025-08-25 RX ORDER — DIPHENHYDRAMINE HYDROCHLORIDE 50 MG/ML
25 INJECTION, SOLUTION INTRAMUSCULAR; INTRAVENOUS
Status: CANCELLED
Start: 2025-08-25

## 2025-08-25 RX ORDER — HEPARIN SODIUM,PORCINE 10 UNIT/ML
5-20 VIAL (ML) INTRAVENOUS DAILY PRN
Status: CANCELLED | OUTPATIENT
Start: 2025-08-25

## 2025-08-25 RX ORDER — HEPARIN SODIUM (PORCINE) LOCK FLUSH IV SOLN 100 UNIT/ML 100 UNIT/ML
5 SOLUTION INTRAVENOUS
Status: CANCELLED | OUTPATIENT
Start: 2025-08-25

## 2025-08-25 RX ORDER — ACETAMINOPHEN 500 MG
1000 TABLET ORAL EVERY 6 HOURS PRN
Status: CANCELLED | OUTPATIENT
Start: 2025-08-25

## 2025-08-25 RX ORDER — IBUPROFEN 200 MG
400 TABLET ORAL EVERY 8 HOURS PRN
Status: CANCELLED | OUTPATIENT
Start: 2025-08-25

## 2025-08-25 RX ORDER — LORAZEPAM 2 MG/ML
0.5 INJECTION INTRAMUSCULAR EVERY 4 HOURS PRN
Status: CANCELLED | OUTPATIENT
Start: 2025-08-25

## 2025-08-25 RX ORDER — ACETAMINOPHEN 325 MG/1
650 TABLET ORAL ONCE
Status: COMPLETED | OUTPATIENT
Start: 2025-08-25 | End: 2025-08-25

## 2025-08-25 RX ORDER — ALBUTEROL SULFATE 90 UG/1
1-2 INHALANT RESPIRATORY (INHALATION)
Status: CANCELLED
Start: 2025-08-25

## 2025-08-25 RX ORDER — ALBUTEROL SULFATE 0.83 MG/ML
2.5 SOLUTION RESPIRATORY (INHALATION)
Status: CANCELLED | OUTPATIENT
Start: 2025-08-25

## 2025-08-25 RX ORDER — HEPARIN SODIUM (PORCINE) LOCK FLUSH IV SOLN 100 UNIT/ML 100 UNIT/ML
5 SOLUTION INTRAVENOUS ONCE
Status: COMPLETED | OUTPATIENT
Start: 2025-08-25 | End: 2025-08-25

## 2025-08-25 RX ORDER — FAMOTIDINE 20 MG/1
20 TABLET, FILM COATED ORAL EVERY 12 HOURS PRN
Status: CANCELLED | OUTPATIENT
Start: 2025-08-25

## 2025-08-25 RX ORDER — DEXAMETHASONE SODIUM PHOSPHATE 10 MG/ML
8 INJECTION, SOLUTION INTRAMUSCULAR; INTRAVENOUS
Status: CANCELLED | OUTPATIENT
Start: 2025-08-25

## 2025-08-25 RX ORDER — DIPHENHYDRAMINE HYDROCHLORIDE 50 MG/ML
50 INJECTION, SOLUTION INTRAMUSCULAR; INTRAVENOUS
Status: CANCELLED
Start: 2025-08-25

## 2025-08-25 RX ORDER — ACETAMINOPHEN 325 MG/1
650 TABLET ORAL ONCE
Status: CANCELLED
Start: 2025-08-25 | End: 2025-08-25

## 2025-08-25 RX ORDER — EPINEPHRINE 1 MG/ML
0.3 INJECTION, SOLUTION INTRAMUSCULAR; SUBCUTANEOUS EVERY 5 MIN PRN
Status: CANCELLED | OUTPATIENT
Start: 2025-08-25

## 2025-08-25 RX ORDER — MEPERIDINE HYDROCHLORIDE 25 MG/ML
25 INJECTION INTRAMUSCULAR; INTRAVENOUS; SUBCUTANEOUS
Status: CANCELLED | OUTPATIENT
Start: 2025-08-25

## 2025-08-25 RX ORDER — METHYLPREDNISOLONE SODIUM SUCCINATE 40 MG/ML
40 INJECTION INTRAMUSCULAR; INTRAVENOUS
Status: CANCELLED
Start: 2025-08-25

## 2025-08-25 RX ADMIN — Medication 5 ML: at 07:28

## 2025-08-25 RX ADMIN — Medication 5 ML: at 11:02

## 2025-08-25 RX ADMIN — ACETAMINOPHEN 650 MG: 325 TABLET ORAL at 09:13

## 2025-08-25 RX ADMIN — SODIUM CHLORIDE 250 ML: 0.9 INJECTION, SOLUTION INTRAVENOUS at 09:36

## 2025-08-25 ASSESSMENT — PAIN SCALES - GENERAL: PAINLEVEL_OUTOF10: NO PAIN (0)

## 2025-08-27 ENCOUNTER — TELEPHONE (OUTPATIENT)
Dept: RESEARCH | Facility: CLINIC | Age: 70
End: 2025-08-27
Payer: COMMERCIAL